# Patient Record
Sex: MALE | Race: WHITE | NOT HISPANIC OR LATINO | URBAN - METROPOLITAN AREA
[De-identification: names, ages, dates, MRNs, and addresses within clinical notes are randomized per-mention and may not be internally consistent; named-entity substitution may affect disease eponyms.]

---

## 2022-10-05 RX ORDER — ARIPIPRAZOLE 15 MG/1
400 TABLET ORAL
Qty: 0 | Refills: 0 | DISCHARGE
Start: 2022-10-05

## 2022-10-09 ENCOUNTER — OUTPATIENT (OUTPATIENT)
Dept: OUTPATIENT SERVICES | Facility: HOSPITAL | Age: 47
LOS: 1 days | End: 2022-10-09

## 2022-10-09 DIAGNOSIS — Z20.828 CONTACT WITH AND (SUSPECTED) EXPOSURE TO OTHER VIRAL COMMUNICABLE DISEASES: ICD-10-CM

## 2022-10-09 DIAGNOSIS — F20.9 SCHIZOPHRENIA, UNSPECIFIED: ICD-10-CM

## 2022-10-11 ENCOUNTER — INPATIENT (INPATIENT)
Facility: HOSPITAL | Age: 47
LOS: 14 days | Discharge: HOME | DRG: 163 | End: 2022-10-26
Attending: THORACIC SURGERY (CARDIOTHORACIC VASCULAR SURGERY) | Admitting: HOSPITALIST
Payer: MEDICARE

## 2022-10-11 VITALS
HEIGHT: 72 IN | WEIGHT: 195.11 LBS | DIASTOLIC BLOOD PRESSURE: 74 MMHG | TEMPERATURE: 100 F | OXYGEN SATURATION: 94 % | HEART RATE: 122 BPM | SYSTOLIC BLOOD PRESSURE: 114 MMHG | RESPIRATION RATE: 18 BRPM

## 2022-10-11 LAB
ALBUMIN SERPL ELPH-MCNC: 3.5 G/DL — SIGNIFICANT CHANGE UP (ref 3.3–5.2)
ALP SERPL-CCNC: 121 U/L — HIGH (ref 40–120)
ALT FLD-CCNC: 11 U/L — SIGNIFICANT CHANGE UP
ANION GAP SERPL CALC-SCNC: 13 MMOL/L — SIGNIFICANT CHANGE UP (ref 5–17)
AST SERPL-CCNC: 12 U/L — SIGNIFICANT CHANGE UP
BASE EXCESS BLDV CALC-SCNC: -0.4 MMOL/L — SIGNIFICANT CHANGE UP (ref -2–3)
BASOPHILS # BLD AUTO: 0.06 K/UL — SIGNIFICANT CHANGE UP (ref 0–0.2)
BASOPHILS NFR BLD AUTO: 0.3 % — SIGNIFICANT CHANGE UP (ref 0–2)
BILIRUB SERPL-MCNC: 0.3 MG/DL — LOW (ref 0.4–2)
BUN SERPL-MCNC: 10.5 MG/DL — SIGNIFICANT CHANGE UP (ref 8–20)
CA-I SERPL-SCNC: 1.15 MMOL/L — SIGNIFICANT CHANGE UP (ref 1.15–1.33)
CALCIUM SERPL-MCNC: 9.4 MG/DL — SIGNIFICANT CHANGE UP (ref 8.4–10.5)
CHLORIDE BLDV-SCNC: 99 MMOL/L — SIGNIFICANT CHANGE UP (ref 98–107)
CHLORIDE SERPL-SCNC: 96 MMOL/L — LOW (ref 98–107)
CO2 SERPL-SCNC: 22 MMOL/L — SIGNIFICANT CHANGE UP (ref 22–29)
CREAT SERPL-MCNC: 0.93 MG/DL — SIGNIFICANT CHANGE UP (ref 0.5–1.3)
EGFR: 103 ML/MIN/1.73M2 — SIGNIFICANT CHANGE UP
EOSINOPHIL # BLD AUTO: 0 K/UL — SIGNIFICANT CHANGE UP (ref 0–0.5)
EOSINOPHIL NFR BLD AUTO: 0 % — SIGNIFICANT CHANGE UP (ref 0–6)
GAS PNL BLDV: 129 MMOL/L — LOW (ref 136–145)
GAS PNL BLDV: SIGNIFICANT CHANGE UP
GAS PNL BLDV: SIGNIFICANT CHANGE UP
GLUCOSE BLDV-MCNC: 152 MG/DL — HIGH (ref 70–99)
GLUCOSE SERPL-MCNC: 140 MG/DL — HIGH (ref 70–99)
HCO3 BLDV-SCNC: 24 MMOL/L — SIGNIFICANT CHANGE UP (ref 22–29)
HCT VFR BLD CALC: 44.3 % — SIGNIFICANT CHANGE UP (ref 39–50)
HCT VFR BLDA CALC: 47 % — SIGNIFICANT CHANGE UP
HGB BLD CALC-MCNC: 15.6 G/DL — SIGNIFICANT CHANGE UP (ref 12.6–17.4)
HGB BLD-MCNC: 14.9 G/DL — SIGNIFICANT CHANGE UP (ref 13–17)
IMM GRANULOCYTES NFR BLD AUTO: 0.6 % — SIGNIFICANT CHANGE UP (ref 0–0.9)
LACTATE BLDV-MCNC: 2.1 MMOL/L — HIGH (ref 0.5–2)
LYMPHOCYTES # BLD AUTO: 1.05 K/UL — SIGNIFICANT CHANGE UP (ref 1–3.3)
LYMPHOCYTES # BLD AUTO: 4.8 % — LOW (ref 13–44)
MCHC RBC-ENTMCNC: 30.1 PG — SIGNIFICANT CHANGE UP (ref 27–34)
MCHC RBC-ENTMCNC: 33.6 GM/DL — SIGNIFICANT CHANGE UP (ref 32–36)
MCV RBC AUTO: 89.5 FL — SIGNIFICANT CHANGE UP (ref 80–100)
MONOCYTES # BLD AUTO: 0.96 K/UL — HIGH (ref 0–0.9)
MONOCYTES NFR BLD AUTO: 4.4 % — SIGNIFICANT CHANGE UP (ref 2–14)
NEUTROPHILS # BLD AUTO: 19.75 K/UL — HIGH (ref 1.8–7.4)
NEUTROPHILS NFR BLD AUTO: 89.9 % — HIGH (ref 43–77)
PCO2 BLDV: 35 MMHG — LOW (ref 42–55)
PH BLDV: 7.44 — HIGH (ref 7.32–7.43)
PLATELET # BLD AUTO: 603 K/UL — HIGH (ref 150–400)
PO2 BLDV: 81 MMHG — HIGH (ref 25–45)
POTASSIUM BLDV-SCNC: 4.3 MMOL/L — SIGNIFICANT CHANGE UP (ref 3.5–5.1)
POTASSIUM SERPL-MCNC: 4.4 MMOL/L — SIGNIFICANT CHANGE UP (ref 3.5–5.3)
POTASSIUM SERPL-SCNC: 4.4 MMOL/L — SIGNIFICANT CHANGE UP (ref 3.5–5.3)
PROT SERPL-MCNC: 7.1 G/DL — SIGNIFICANT CHANGE UP (ref 6.6–8.7)
RBC # BLD: 4.95 M/UL — SIGNIFICANT CHANGE UP (ref 4.2–5.8)
RBC # FLD: 13.2 % — SIGNIFICANT CHANGE UP (ref 10.3–14.5)
SAO2 % BLDV: 97.6 % — SIGNIFICANT CHANGE UP
SODIUM SERPL-SCNC: 131 MMOL/L — LOW (ref 135–145)
TROPONIN T SERPL-MCNC: <0.01 NG/ML — SIGNIFICANT CHANGE UP (ref 0–0.06)
WBC # BLD: 21.96 K/UL — HIGH (ref 3.8–10.5)
WBC # FLD AUTO: 21.96 K/UL — HIGH (ref 3.8–10.5)

## 2022-10-11 PROCEDURE — 71045 X-RAY EXAM CHEST 1 VIEW: CPT | Mod: 26

## 2022-10-11 PROCEDURE — 99285 EMERGENCY DEPT VISIT HI MDM: CPT

## 2022-10-11 RX ORDER — CEFTRIAXONE 500 MG/1
1000 INJECTION, POWDER, FOR SOLUTION INTRAMUSCULAR; INTRAVENOUS ONCE
Refills: 0 | Status: COMPLETED | OUTPATIENT
Start: 2022-10-11 | End: 2022-10-11

## 2022-10-11 RX ORDER — CEFTRIAXONE 500 MG/1
1000 INJECTION, POWDER, FOR SOLUTION INTRAMUSCULAR; INTRAVENOUS ONCE
Refills: 0 | Status: DISCONTINUED | OUTPATIENT
Start: 2022-10-11 | End: 2022-10-11

## 2022-10-11 RX ORDER — SODIUM CHLORIDE 9 MG/ML
2700 INJECTION, SOLUTION INTRAVENOUS ONCE
Refills: 0 | Status: COMPLETED | OUTPATIENT
Start: 2022-10-11 | End: 2022-10-11

## 2022-10-11 RX ORDER — ACETAMINOPHEN 500 MG
975 TABLET ORAL ONCE
Refills: 0 | Status: COMPLETED | OUTPATIENT
Start: 2022-10-11 | End: 2022-10-11

## 2022-10-11 RX ADMIN — Medication 975 MILLIGRAM(S): at 22:58

## 2022-10-11 RX ADMIN — Medication 975 MILLIGRAM(S): at 21:58

## 2022-10-11 RX ADMIN — SODIUM CHLORIDE 2700 MILLILITER(S): 9 INJECTION, SOLUTION INTRAVENOUS at 21:59

## 2022-10-11 RX ADMIN — CEFTRIAXONE 1000 MILLIGRAM(S): 500 INJECTION, POWDER, FOR SOLUTION INTRAMUSCULAR; INTRAVENOUS at 21:59

## 2022-10-11 NOTE — ED PROVIDER NOTE - CLINICAL SUMMARY MEDICAL DECISION MAKING FREE TEXT BOX
Pleuritic chest pain, fever, leukocytosis, no hypoxia, has large loculated pleural effusion on CT. Empiric abx. CT surgery consulted for intervention. Medical admission for IV abx.

## 2022-10-11 NOTE — ED ADULT TRIAGE NOTE - CHIEF COMPLAINT QUOTE
Pt A&Ox4 states "I haven't been feeling well for past few days." BIBA c/o left sided pain. Patient has been feeling unwell for a few days.

## 2022-10-11 NOTE — ED PROVIDER NOTE - OBJECTIVE STATEMENT
47yom inpatient [facility] p/w left sided chest/abdominal pain. Reports intermittent pain on his left side for the last 4 days as well as a dry cough. Currently denies any pain. Denies any fever or chills but per [facility] pt was tachycardic.

## 2022-10-12 ENCOUNTER — RESULT REVIEW (OUTPATIENT)
Age: 47
End: 2022-10-12

## 2022-10-12 DIAGNOSIS — J90 PLEURAL EFFUSION, NOT ELSEWHERE CLASSIFIED: ICD-10-CM

## 2022-10-12 DIAGNOSIS — Z90.89 ACQUIRED ABSENCE OF OTHER ORGANS: Chronic | ICD-10-CM

## 2022-10-12 DIAGNOSIS — F25.1 SCHIZOAFFECTIVE DISORDER, DEPRESSIVE TYPE: ICD-10-CM

## 2022-10-12 LAB
ALBUMIN SERPL ELPH-MCNC: 2.7 G/DL — LOW (ref 3.3–5.2)
ALP SERPL-CCNC: 97 U/L — SIGNIFICANT CHANGE UP (ref 40–120)
ALT FLD-CCNC: 9 U/L — SIGNIFICANT CHANGE UP
ANION GAP SERPL CALC-SCNC: 8 MMOL/L — SIGNIFICANT CHANGE UP (ref 5–17)
APPEARANCE UR: CLEAR — SIGNIFICANT CHANGE UP
APTT BLD: 33 SEC — SIGNIFICANT CHANGE UP (ref 27.5–35.5)
AST SERPL-CCNC: 8 U/L — SIGNIFICANT CHANGE UP
B PERT IGG+IGM PNL SER: ABNORMAL
BACTERIA # UR AUTO: ABNORMAL
BASE EXCESS BLDV CALC-SCNC: 1.6 MMOL/L — SIGNIFICANT CHANGE UP (ref -2–3)
BILIRUB SERPL-MCNC: 0.6 MG/DL — SIGNIFICANT CHANGE UP (ref 0.4–2)
BILIRUB UR-MCNC: NEGATIVE — SIGNIFICANT CHANGE UP
BUN SERPL-MCNC: 8.4 MG/DL — SIGNIFICANT CHANGE UP (ref 8–20)
CA-I SERPL-SCNC: 1.13 MMOL/L — LOW (ref 1.15–1.33)
CALCIUM SERPL-MCNC: 9 MG/DL — SIGNIFICANT CHANGE UP (ref 8.4–10.5)
CHLORIDE BLDV-SCNC: 100 MMOL/L — SIGNIFICANT CHANGE UP (ref 98–107)
CHLORIDE SERPL-SCNC: 98 MMOL/L — SIGNIFICANT CHANGE UP (ref 98–107)
CO2 SERPL-SCNC: 23 MMOL/L — SIGNIFICANT CHANGE UP (ref 22–29)
COLOR FLD: ABNORMAL
COLOR SPEC: YELLOW — SIGNIFICANT CHANGE UP
COMMENT - URINE: SIGNIFICANT CHANGE UP
CREAT SERPL-MCNC: 0.64 MG/DL — SIGNIFICANT CHANGE UP (ref 0.5–1.3)
DIFF PNL FLD: NEGATIVE — SIGNIFICANT CHANGE UP
EGFR: 119 ML/MIN/1.73M2 — SIGNIFICANT CHANGE UP
EOSINOPHIL # FLD: 1 % — SIGNIFICANT CHANGE UP
EPI CELLS # UR: NEGATIVE — SIGNIFICANT CHANGE UP
FLUID INTAKE SUBSTANCE CLASS: SIGNIFICANT CHANGE UP
GAS PNL BLDV: 129 MMOL/L — LOW (ref 136–145)
GAS PNL BLDV: SIGNIFICANT CHANGE UP
GAS PNL BLDV: SIGNIFICANT CHANGE UP
GLUCOSE BLDV-MCNC: 130 MG/DL — HIGH (ref 70–99)
GLUCOSE SERPL-MCNC: 145 MG/DL — HIGH (ref 70–99)
GLUCOSE UR QL: NEGATIVE MG/DL — SIGNIFICANT CHANGE UP
GRAM STN FLD: SIGNIFICANT CHANGE UP
HCO3 BLDV-SCNC: 26 MMOL/L — SIGNIFICANT CHANGE UP (ref 22–29)
HCT VFR BLD CALC: 41.4 % — SIGNIFICANT CHANGE UP (ref 39–50)
HCT VFR BLDA CALC: 43 % — SIGNIFICANT CHANGE UP
HGB BLD CALC-MCNC: 14.2 G/DL — SIGNIFICANT CHANGE UP (ref 12.6–17.4)
HGB BLD-MCNC: 13.8 G/DL — SIGNIFICANT CHANGE UP (ref 13–17)
HYALINE CASTS # UR AUTO: ABNORMAL /LPF
INR BLD: 1.8 RATIO — HIGH (ref 0.88–1.16)
KETONES UR-MCNC: ABNORMAL
LACTATE BLDV-MCNC: 1.3 MMOL/L — SIGNIFICANT CHANGE UP (ref 0.5–2)
LEUKOCYTE ESTERASE UR-ACNC: ABNORMAL
LYMPHOCYTES # FLD: 1 % — SIGNIFICANT CHANGE UP
MCHC RBC-ENTMCNC: 30.1 PG — SIGNIFICANT CHANGE UP (ref 27–34)
MCHC RBC-ENTMCNC: 33.3 GM/DL — SIGNIFICANT CHANGE UP (ref 32–36)
MCV RBC AUTO: 90.4 FL — SIGNIFICANT CHANGE UP (ref 80–100)
MONOS+MACROS # FLD: 4 % — SIGNIFICANT CHANGE UP
NEUTROPHILS-BODY FLUID: 94 % — SIGNIFICANT CHANGE UP
NITRITE UR-MCNC: NEGATIVE — SIGNIFICANT CHANGE UP
PCO2 BLDV: 39 MMHG — LOW (ref 42–55)
PH BLDV: 7.43 — SIGNIFICANT CHANGE UP (ref 7.32–7.43)
PH FLD: 7.5 — SIGNIFICANT CHANGE UP
PH UR: 6 — SIGNIFICANT CHANGE UP (ref 5–8)
PLATELET # BLD AUTO: 509 K/UL — HIGH (ref 150–400)
PO2 BLDV: 141 MMHG — HIGH (ref 25–45)
POTASSIUM BLDV-SCNC: 4.5 MMOL/L — SIGNIFICANT CHANGE UP (ref 3.5–5.1)
POTASSIUM SERPL-MCNC: 4.2 MMOL/L — SIGNIFICANT CHANGE UP (ref 3.5–5.3)
POTASSIUM SERPL-SCNC: 4.2 MMOL/L — SIGNIFICANT CHANGE UP (ref 3.5–5.3)
PROT SERPL-MCNC: 6.4 G/DL — LOW (ref 6.6–8.7)
PROT UR-MCNC: 15
PROTHROM AB SERPL-ACNC: 21 SEC — HIGH (ref 10.5–13.4)
RAPID RVP RESULT: SIGNIFICANT CHANGE UP
RBC # BLD: 4.58 M/UL — SIGNIFICANT CHANGE UP (ref 4.2–5.8)
RBC # FLD: 13.2 % — SIGNIFICANT CHANGE UP (ref 10.3–14.5)
RBC CASTS # UR COMP ASSIST: ABNORMAL /HPF (ref 0–4)
RCV VOL RI: <3000 /UL — HIGH (ref 0–0)
SAO2 % BLDV: 99.1 % — SIGNIFICANT CHANGE UP
SARS-COV-2 RNA SPEC QL NAA+PROBE: SIGNIFICANT CHANGE UP
SODIUM SERPL-SCNC: 129 MMOL/L — LOW (ref 135–145)
SP GR SPEC: 1.02 — SIGNIFICANT CHANGE UP (ref 1.01–1.02)
SPECIMEN SOURCE FLD: SIGNIFICANT CHANGE UP
SPECIMEN SOURCE FLD: SIGNIFICANT CHANGE UP
SPECIMEN SOURCE: SIGNIFICANT CHANGE UP
TOTAL NUCLEATED CELL COUNT, BODY FLUID: SIGNIFICANT CHANGE UP /UL
TUBE TYPE: SIGNIFICANT CHANGE UP
UROBILINOGEN FLD QL: NEGATIVE MG/DL — SIGNIFICANT CHANGE UP
WBC # BLD: 32.26 K/UL — HIGH (ref 3.8–10.5)
WBC # FLD AUTO: 32.26 K/UL — HIGH (ref 3.8–10.5)
WBC UR QL: SIGNIFICANT CHANGE UP /HPF (ref 0–5)

## 2022-10-12 PROCEDURE — 71045 X-RAY EXAM CHEST 1 VIEW: CPT | Mod: 26,77

## 2022-10-12 PROCEDURE — 88305 TISSUE EXAM BY PATHOLOGIST: CPT | Mod: 26

## 2022-10-12 PROCEDURE — 71045 X-RAY EXAM CHEST 1 VIEW: CPT | Mod: 26

## 2022-10-12 PROCEDURE — 99222 1ST HOSP IP/OBS MODERATE 55: CPT | Mod: 25

## 2022-10-12 PROCEDURE — 93010 ELECTROCARDIOGRAM REPORT: CPT

## 2022-10-12 PROCEDURE — 32551 INSERTION OF CHEST TUBE: CPT

## 2022-10-12 PROCEDURE — 99223 1ST HOSP IP/OBS HIGH 75: CPT

## 2022-10-12 PROCEDURE — 71250 CT THORAX DX C-: CPT | Mod: 26,MA

## 2022-10-12 PROCEDURE — 88112 CYTOPATH CELL ENHANCE TECH: CPT | Mod: 26

## 2022-10-12 RX ORDER — NICOTINE POLACRILEX 2 MG
1 GUM BUCCAL
Qty: 0 | Refills: 0 | DISCHARGE

## 2022-10-12 RX ORDER — DOCUSATE SODIUM 100 MG
1 CAPSULE ORAL
Qty: 0 | Refills: 0 | DISCHARGE

## 2022-10-12 RX ORDER — CLONAZEPAM 1 MG
1 TABLET ORAL
Qty: 0 | Refills: 0 | DISCHARGE

## 2022-10-12 RX ORDER — SENNA PLUS 8.6 MG/1
2 TABLET ORAL
Qty: 0 | Refills: 0 | DISCHARGE

## 2022-10-12 RX ORDER — CLONAZEPAM 1 MG
1 TABLET ORAL THREE TIMES A DAY
Refills: 0 | Status: DISCONTINUED | OUTPATIENT
Start: 2022-10-12 | End: 2022-10-19

## 2022-10-12 RX ORDER — PIPERACILLIN AND TAZOBACTAM 4; .5 G/20ML; G/20ML
3.38 INJECTION, POWDER, LYOPHILIZED, FOR SOLUTION INTRAVENOUS ONCE
Refills: 0 | Status: COMPLETED | OUTPATIENT
Start: 2022-10-12 | End: 2022-10-12

## 2022-10-12 RX ORDER — ONDANSETRON 8 MG/1
4 TABLET, FILM COATED ORAL EVERY 8 HOURS
Refills: 0 | Status: DISCONTINUED | OUTPATIENT
Start: 2022-10-12 | End: 2022-10-21

## 2022-10-12 RX ORDER — LANOLIN ALCOHOL/MO/W.PET/CERES
3 CREAM (GRAM) TOPICAL AT BEDTIME
Refills: 0 | Status: DISCONTINUED | OUTPATIENT
Start: 2022-10-12 | End: 2022-10-21

## 2022-10-12 RX ORDER — LITHIUM CARBONATE 300 MG/1
450 TABLET, EXTENDED RELEASE ORAL
Refills: 0 | Status: DISCONTINUED | OUTPATIENT
Start: 2022-10-12 | End: 2022-10-21

## 2022-10-12 RX ORDER — ACETAMINOPHEN 500 MG
650 TABLET ORAL EVERY 6 HOURS
Refills: 0 | Status: DISCONTINUED | OUTPATIENT
Start: 2022-10-12 | End: 2022-10-21

## 2022-10-12 RX ORDER — ATROPINE SULFATE 1 %
1 DROPS OPHTHALMIC (EYE)
Refills: 0 | Status: DISCONTINUED | OUTPATIENT
Start: 2022-10-12 | End: 2022-10-21

## 2022-10-12 RX ORDER — SENNA PLUS 8.6 MG/1
2 TABLET ORAL AT BEDTIME
Refills: 0 | Status: DISCONTINUED | OUTPATIENT
Start: 2022-10-12 | End: 2022-10-21

## 2022-10-12 RX ORDER — AZITHROMYCIN 500 MG/1
500 TABLET, FILM COATED ORAL ONCE
Refills: 0 | Status: COMPLETED | OUTPATIENT
Start: 2022-10-12 | End: 2022-10-12

## 2022-10-12 RX ORDER — CLOZAPINE 150 MG/1
250 TABLET, ORALLY DISINTEGRATING ORAL
Qty: 0 | Refills: 0 | DISCHARGE

## 2022-10-12 RX ORDER — ATROPINE SULFATE 1 %
2 DROPS OPHTHALMIC (EYE)
Qty: 0 | Refills: 0 | DISCHARGE

## 2022-10-12 RX ORDER — CLOZAPINE 150 MG/1
250 TABLET, ORALLY DISINTEGRATING ORAL AT BEDTIME
Refills: 0 | Status: DISCONTINUED | OUTPATIENT
Start: 2022-10-12 | End: 2022-10-21

## 2022-10-12 RX ORDER — NICOTINE POLACRILEX 2 MG
1 GUM BUCCAL DAILY
Refills: 0 | Status: DISCONTINUED | OUTPATIENT
Start: 2022-10-12 | End: 2022-10-21

## 2022-10-12 RX ORDER — LITHIUM CARBONATE 300 MG/1
3 TABLET, EXTENDED RELEASE ORAL
Qty: 0 | Refills: 0 | DISCHARGE

## 2022-10-12 RX ORDER — PIPERACILLIN AND TAZOBACTAM 4; .5 G/20ML; G/20ML
3.38 INJECTION, POWDER, LYOPHILIZED, FOR SOLUTION INTRAVENOUS EVERY 8 HOURS
Refills: 0 | Status: DISCONTINUED | OUTPATIENT
Start: 2022-10-12 | End: 2022-10-21

## 2022-10-12 RX ADMIN — PIPERACILLIN AND TAZOBACTAM 25 GRAM(S): 4; .5 INJECTION, POWDER, LYOPHILIZED, FOR SOLUTION INTRAVENOUS at 15:21

## 2022-10-12 RX ADMIN — LITHIUM CARBONATE 450 MILLIGRAM(S): 300 TABLET, EXTENDED RELEASE ORAL at 22:45

## 2022-10-12 RX ADMIN — AZITHROMYCIN 255 MILLIGRAM(S): 500 TABLET, FILM COATED ORAL at 07:06

## 2022-10-12 RX ADMIN — PIPERACILLIN AND TAZOBACTAM 200 GRAM(S): 4; .5 INJECTION, POWDER, LYOPHILIZED, FOR SOLUTION INTRAVENOUS at 09:09

## 2022-10-12 RX ADMIN — Medication 1 PATCH: at 09:10

## 2022-10-12 RX ADMIN — PIPERACILLIN AND TAZOBACTAM 25 GRAM(S): 4; .5 INJECTION, POWDER, LYOPHILIZED, FOR SOLUTION INTRAVENOUS at 22:45

## 2022-10-12 RX ADMIN — Medication 1 DROP(S): at 18:20

## 2022-10-12 NOTE — H&P ADULT - ASSESSMENT
46 yo male with hx of Schizoaffective disorder, bipolar and tobacco abuse presents with complaints of left sided pain and shortness of breath. Patient reports 4 day  history of body aches with poor appetite. He has only been drinking liquids at home. Patient was found to be tachycardiac up to 140s at [facility] and was therefore sent in for an evaluation. While in the ED, cxr and CT chest was obtained which showed a large loculated left pleural effusion. CT surgery was consulted and admission to medicine was requested.       #Large loculated pleural effusion  CXR Reviewed  CT chest noted     CT surgery to perform possible chest tube  Send fluid analysis   Was given ceftriaxone/azithromycin in ED - Will place on zosyn for now  De-escalate ABX as appropriate    #Schizoaffective disorder/Bipolar  C/w Clonazaepam 1mg tid  C/w lithium 450mg BID  Aripiprazole 400mg IM every 28 days - Last dose was 10/5/2022  Clozapine 250mg daily  Behavioral health to evaluate  [facility] 1:1 at bedside    #Tobacco abuse  Nicotine patch  Counseling    SCDs

## 2022-10-12 NOTE — H&P ADULT - NSICDXPASTMEDICALHX_GEN_ALL_CORE_FT
PAST MEDICAL HISTORY:  Bipolar disorder     History of tobacco abuse     Schizoaffective disorder

## 2022-10-12 NOTE — H&P ADULT - NSHPSOCIALHISTORY_GEN_ALL_CORE
Lives at [facility] for the past five months  Prior smoker of 2ppd but has not been smoking since at [facility]  Does not drink alcohol

## 2022-10-12 NOTE — CONSULT NOTE ADULT - ASSESSMENT
ASSESSMENT:   47 year old male patient, current [place] resident, admitted to medicine with c/o left sided abdominal pain/chest discomfort X 4 days. +Dry cough. Patient found with Leukocytosis (WBC >20), low grade fever, and CT chest showing Large Left Loculated Pleural Effusion. Patient in no acute respiratory distress, with SpO2 stable on room air.     PLAN:  Admitted to Medicine.  Continuous SpO2 monitoring.  Patient currently stable with SpO2 >92% on room air while sitting/talking.  Maintain SpO2 >92%. Supplement with O2 therapy PRN.   POCUS to be performed at bedside later today to further evaluate the Left sided pleural effusion / evaluation for a chest tube.   Will consider placing a chest tube sooner if patient experiences increased work of breathing, SpO2 starts to trend down, O2 requirements start to trend up.   Further plan as per primary team.   Will continue to follow along.

## 2022-10-12 NOTE — ED BEHAVIORAL HEALTH ASSESSMENT NOTE - HPI (INCLUDE ILLNESS QUALITY, SEVERITY, DURATION, TIMING, CONTEXT, MODIFYING FACTORS, ASSOCIATED SIGNS AND SYMPTOMS)
[facility] patients wife called as they have concerns about Lipitor and would like to discuss. She can be reached at [phone] (h).  
Call to pt and spouse informed of MD instructions. Pt has 90 day supply of atorvastatin. No further questions at this time.  
Call to pt's spouse Bita and pt. Pt has concerns that medication is causing insomnia. Pt wants to try atorvastatin again and take it in the morning time. Pt had read an article that that statin is ok to take in AM. Informed pt and spouse will review with MD and call back. No further questions.  
Left a message for patient to call back.    
Patient returned your call, please call them back.  
Per verbal order by meli sEpana to change to atorvastatin and take in AM.    
50 y/o male with PPH of schizoaffective disorder and Tobacco use disorder presents to the ED from [facility] complaining of Left sided chest pain. States that the pain is sharp.     Patient is lying in bed, stable on room air. States that he had past thoughts of suicidality, but never went through with it, due to being scared. States that he used to smoke every day, but stopped once admitted to [place]. States that he also feels depressed. Denies huy, alcohol dependence, changes in appetite, hallucinations, delusions.

## 2022-10-12 NOTE — H&P ADULT - NSHPPHYSICALEXAM_GEN_ALL_CORE
PHYSICAL EXAM:  Vital Signs Last 24 Hrs  T(F): 98.8 (12 Oct 2022 08:07), Max: 100 (11 Oct 2022 20:23)  HR: 110 (12 Oct 2022 08:07) (89 - 122)  BP: 109/68 (12 Oct 2022 08:07) (109/68 - 121/72)  RR: 19 (12 Oct 2022 08:07) (18 - 25)  SpO2: 91% (12 Oct 2022 08:07) (91% - 96%)  [place] 1:1 at bedside  GENERAL: NAD, Resting in bed  Eyes: EOMI, PERRLA  ENMT: Conjunctiva and sclera clear; supple neck, No JVD  Cardiovascular: S1,S2, RRR, No murmur  Respiratory: Decreased breath sounds on right side and clear on left  GI: Bowel sounds present; Soft, Nontender, Nondistended. No hepatomegaly  Genitourinary: Deferred  Skin:  no breakdowns, ulcers or discharge, No rashes or lesions  Neurology: Alert & Oriented, non-focal and spontaneous movements of all extremities, CN 2 to 12 grossly intact   Psych: Appropriate mood and affect, calm, pleasant

## 2022-10-12 NOTE — H&P ADULT - HISTORY OF PRESENT ILLNESS
46 yo male with hx of Schizoaffective disorder, bipolar and tobacco abuse presents with complaints of left sided pain and shortness of breath. Patient reports 4 day  history of body aches with poor appetite. He has only been drinking liquids at home. Patient was found to be tachycardiac up to 140s at [facility] and was therefore sent in for an evaluation. Patient endorses subjective fevers associated with a dry cough while at [facility]. While in the ED, cxr and CT chest was obtained which showed a large loculated left pleural effusion. CT surgery was consulted and admission to medicine was requested.

## 2022-10-12 NOTE — ED BEHAVIORAL HEALTH ASSESSMENT NOTE - DESCRIPTION
Unknown Patient is lying in bed.     Medications  Azithromycin 500mg IV infusion over 60 min 10/12/22 6:47, Tylenol 975 mg PO once 10/11/22 20:50, Ceftriaxone 1000mg IV push 10/11/22 20:52, Clonazepam 1mg Oral TID 10/12/22 8:42, Lithium 450 mg PO BID 1012/22 8:42, Pipercillin/Tazobactam  3.375 g IV q 8 hrs 10/12/22 8:47  Vital Signs Last 24 Hrs  T(C): 37.1 (12 Oct 2022 08:07), Max: 37.8 (11 Oct 2022 20:23)  T(F): 98.8 (12 Oct 2022 08:07), Max: 100 (11 Oct 2022 20:23)  HR: 110 (12 Oct 2022 08:07) (89 - 122)  BP: 109/68 (12 Oct 2022 08:07) (109/68 - 121/72)  BP(mean): --  RR: 19 (12 Oct 2022 08:07) (18 - 25)  SpO2: 91% (12 Oct 2022 08:07) (91% - 96%)    Parameters below as of 12 Oct 2022 08:07  Patient On (Oxygen Delivery Method): room air HTN dyslipidemia obesity

## 2022-10-12 NOTE — ED BEHAVIORAL HEALTH ASSESSMENT NOTE - SUMMARY
Patient w/ left sided Pleural effusion, and depressive symptoms. Patient is awaiting CT surgery for consult on removal of the pleural effusion

## 2022-10-12 NOTE — ED BEHAVIORAL HEALTH ASSESSMENT NOTE - RISK ASSESSMENT
Risk factors: Suicidality, current and past hospitalization    Protective: currently taking medication Low Acute Suicide Risk

## 2022-10-12 NOTE — ED BEHAVIORAL HEALTH ASSESSMENT NOTE - NSBHATTESTCOMMENTATTENDFT_PSY_A_CORE
cooperative seems to comprehend medical conditions and need for possible aspiration  I believe he has capacity to give consent

## 2022-10-12 NOTE — H&P ADULT - NSHPREVIEWOFSYSTEMS_GEN_ALL_CORE
Review of Systems:  CONSTITUTIONAL: +Weakness  EYES/ENT: No visual changes;  No vertigo or throat pain   NECK: No pain or stiffness  RESPIRATORY: No  wheezing, hemoptysis; +SOB AND COUGH  CARDIOVASCULAR: No chest pain or palpitations  GASTROINTESTINAL: No abdominal or epigastric pain. No nausea, vomiting, or hematemesis; No diarrhea or constipation.   GENITOURINARY: No dysuria, frequency or hematuria  NEUROLOGICAL: No numbness or weakness  SKIN: No itching, burning, rashes, or lesions   All other review of systems is negative unless indicated above

## 2022-10-12 NOTE — ED ADULT NURSE REASSESSMENT NOTE - NS ED NURSE REASSESS COMMENT FT1
assumed care of pt, pt resting comfortably in stretcher, chest tune remains in place draining to gravity, no complaints offered at this time, home aid at bedside, will ctm.

## 2022-10-12 NOTE — ED BEHAVIORAL HEALTH ASSESSMENT NOTE - CURRENT MEDICATION
Aripiprazole 400mg IM q1 month  Atropine Sulfate  1% opthalmic solution BID  Benzocaine 6/Menthol 10mg PO QID  Clonazepam 1mg PO QD  Clozapine 200mg PO QD  Clozapine 50mg PO QD  Docusate NA 100mg PO TID  Guaifenesin 100mg/5ml PO TID  Ltihium carbonate 150mg PO BID  Lithium Carbonate 300mg PO BID  Magnesium hydroxide 2400mg/30ml PO q.3.d  Nicotine 14mg/24hr patch QD  Sennosides 8.6mg PO BID Aripiprazole 400mg IM q1 month  Atropine Sulfate  1% opthalmic solution BID  Benzocaine 6/Menthol 10mg PO QID  Clonazepam 1mg PO QD  Clozapine 200mg PO QD  Clozapine 50mg PO QD  Docusate NA 100mg PO TID  Guaifenesin 100mg/5ml PO TID  Lithium carbonate 150mg PO BID  Lithium Carbonate 300mg PO BID  Magnesium hydroxide 2400mg/30ml PO q.3.d  Nicotine 14mg/24hr patch QD  Sennosides 8.6mg PO BID

## 2022-10-13 LAB
A1C WITH ESTIMATED AVERAGE GLUCOSE RESULT: 5.4 % — SIGNIFICANT CHANGE UP (ref 4–5.6)
ACANTHOCYTES BLD QL SMEAR: SLIGHT — SIGNIFICANT CHANGE UP
ALBUMIN FLD-MCNC: 2.7 G/DL — SIGNIFICANT CHANGE UP
ALBUMIN SERPL ELPH-MCNC: 2.6 G/DL — LOW (ref 3.3–5.2)
ALP SERPL-CCNC: 96 U/L — SIGNIFICANT CHANGE UP (ref 40–120)
ALT FLD-CCNC: 9 U/L — SIGNIFICANT CHANGE UP
ANION GAP SERPL CALC-SCNC: 11 MMOL/L — SIGNIFICANT CHANGE UP (ref 5–17)
AST SERPL-CCNC: 9 U/L — SIGNIFICANT CHANGE UP
BASOPHILS # BLD AUTO: 0 K/UL — SIGNIFICANT CHANGE UP (ref 0–0.2)
BASOPHILS NFR BLD AUTO: 0 % — SIGNIFICANT CHANGE UP (ref 0–2)
BILIRUB SERPL-MCNC: 0.7 MG/DL — SIGNIFICANT CHANGE UP (ref 0.4–2)
BUN SERPL-MCNC: 9.2 MG/DL — SIGNIFICANT CHANGE UP (ref 8–20)
CALCIUM SERPL-MCNC: 8.9 MG/DL — SIGNIFICANT CHANGE UP (ref 8.4–10.5)
CHLORIDE SERPL-SCNC: 97 MMOL/L — LOW (ref 98–107)
CHOLEST SERPL-MCNC: 74 MG/DL — SIGNIFICANT CHANGE UP
CO2 SERPL-SCNC: 24 MMOL/L — SIGNIFICANT CHANGE UP (ref 22–29)
CREAT SERPL-MCNC: 0.77 MG/DL — SIGNIFICANT CHANGE UP (ref 0.5–1.3)
CULTURE RESULTS: SIGNIFICANT CHANGE UP
EGFR: 113 ML/MIN/1.73M2 — SIGNIFICANT CHANGE UP
EOSINOPHIL # BLD AUTO: 0 K/UL — SIGNIFICANT CHANGE UP (ref 0–0.5)
EOSINOPHIL NFR BLD AUTO: 0 % — SIGNIFICANT CHANGE UP (ref 0–6)
ESTIMATED AVERAGE GLUCOSE: 108 MG/DL — SIGNIFICANT CHANGE UP (ref 68–114)
GLUCOSE FLD-MCNC: <2 MG/DL — SIGNIFICANT CHANGE UP
GLUCOSE SERPL-MCNC: 114 MG/DL — HIGH (ref 70–99)
HCT VFR BLD CALC: 42.2 % — SIGNIFICANT CHANGE UP (ref 39–50)
HDLC SERPL-MCNC: 28 MG/DL — LOW
HGB BLD-MCNC: 14.1 G/DL — SIGNIFICANT CHANGE UP (ref 13–17)
LDH SERPL L TO P-CCNC: 529 U/L — SIGNIFICANT CHANGE UP
LIPID PNL WITH DIRECT LDL SERPL: 22 MG/DL — SIGNIFICANT CHANGE UP
LYMPHOCYTES # BLD AUTO: 0.81 K/UL — LOW (ref 1–3.3)
LYMPHOCYTES # BLD AUTO: 2.6 % — LOW (ref 13–44)
MANUAL SMEAR VERIFICATION: SIGNIFICANT CHANGE UP
MCHC RBC-ENTMCNC: 30.2 PG — SIGNIFICANT CHANGE UP (ref 27–34)
MCHC RBC-ENTMCNC: 33.4 GM/DL — SIGNIFICANT CHANGE UP (ref 32–36)
MCV RBC AUTO: 90.4 FL — SIGNIFICANT CHANGE UP (ref 80–100)
MONOCYTES # BLD AUTO: 0.53 K/UL — SIGNIFICANT CHANGE UP (ref 0–0.9)
MONOCYTES NFR BLD AUTO: 1.7 % — LOW (ref 2–14)
NEUTROPHILS # BLD AUTO: 29.41 K/UL — HIGH (ref 1.8–7.4)
NEUTROPHILS NFR BLD AUTO: 93.9 % — HIGH (ref 43–77)
NEUTS BAND # BLD: 0.9 % — SIGNIFICANT CHANGE UP (ref 0–8)
NON HDL CHOLESTEROL: 46 MG/DL — SIGNIFICANT CHANGE UP
PLAT MORPH BLD: NORMAL — SIGNIFICANT CHANGE UP
PLATELET # BLD AUTO: 531 K/UL — HIGH (ref 150–400)
POIKILOCYTOSIS BLD QL AUTO: SLIGHT — SIGNIFICANT CHANGE UP
POLYCHROMASIA BLD QL SMEAR: SIGNIFICANT CHANGE UP
POTASSIUM SERPL-MCNC: 4 MMOL/L — SIGNIFICANT CHANGE UP (ref 3.5–5.3)
POTASSIUM SERPL-SCNC: 4 MMOL/L — SIGNIFICANT CHANGE UP (ref 3.5–5.3)
PROT FLD-MCNC: 5 G/DL — SIGNIFICANT CHANGE UP
PROT SERPL-MCNC: 6.3 G/DL — LOW (ref 6.6–8.7)
RBC # BLD: 4.67 M/UL — SIGNIFICANT CHANGE UP (ref 4.2–5.8)
RBC # FLD: 13.2 % — SIGNIFICANT CHANGE UP (ref 10.3–14.5)
RBC BLD AUTO: ABNORMAL
SODIUM SERPL-SCNC: 132 MMOL/L — LOW (ref 135–145)
SPECIMEN SOURCE: SIGNIFICANT CHANGE UP
TRIGL SERPL-MCNC: 122 MG/DL — SIGNIFICANT CHANGE UP
VARIANT LYMPHS # BLD: 0.9 % — SIGNIFICANT CHANGE UP (ref 0–6)
WBC # BLD: 31.02 K/UL — HIGH (ref 3.8–10.5)
WBC # FLD AUTO: 31.02 K/UL — HIGH (ref 3.8–10.5)

## 2022-10-13 PROCEDURE — 99406 BEHAV CHNG SMOKING 3-10 MIN: CPT

## 2022-10-13 PROCEDURE — 99233 SBSQ HOSP IP/OBS HIGH 50: CPT

## 2022-10-13 PROCEDURE — 99231 SBSQ HOSP IP/OBS SF/LOW 25: CPT | Mod: FS

## 2022-10-13 RX ADMIN — Medication 1 DROP(S): at 06:31

## 2022-10-13 RX ADMIN — LITHIUM CARBONATE 450 MILLIGRAM(S): 300 TABLET, EXTENDED RELEASE ORAL at 06:31

## 2022-10-13 RX ADMIN — Medication 1 MILLIGRAM(S): at 14:14

## 2022-10-13 RX ADMIN — Medication 1 MILLIGRAM(S): at 06:04

## 2022-10-13 RX ADMIN — LITHIUM CARBONATE 450 MILLIGRAM(S): 300 TABLET, EXTENDED RELEASE ORAL at 19:05

## 2022-10-13 RX ADMIN — SENNA PLUS 2 TABLET(S): 8.6 TABLET ORAL at 06:05

## 2022-10-13 RX ADMIN — PIPERACILLIN AND TAZOBACTAM 25 GRAM(S): 4; .5 INJECTION, POWDER, LYOPHILIZED, FOR SOLUTION INTRAVENOUS at 23:30

## 2022-10-13 RX ADMIN — PIPERACILLIN AND TAZOBACTAM 25 GRAM(S): 4; .5 INJECTION, POWDER, LYOPHILIZED, FOR SOLUTION INTRAVENOUS at 14:14

## 2022-10-13 RX ADMIN — Medication 1 DROP(S): at 19:06

## 2022-10-13 RX ADMIN — PIPERACILLIN AND TAZOBACTAM 25 GRAM(S): 4; .5 INJECTION, POWDER, LYOPHILIZED, FOR SOLUTION INTRAVENOUS at 06:32

## 2022-10-13 NOTE — PATIENT PROFILE ADULT - FALL HARM RISK - HARM RISK INTERVENTIONS

## 2022-10-13 NOTE — ED ADULT NURSE REASSESSMENT NOTE - NS ED NURSE REASSESS COMMENT FT1
Report received from offgoing RN, charting as noted. Patient is resting comfortably in stretcher, [facility] aide at bedside, cardiovascular at bedside. patients pleuravac with 1900 total output. Pleuravac changed to new collection chamber.

## 2022-10-13 NOTE — PROGRESS NOTE ADULT - ASSESSMENT
ASSESSMENT:   47 year old male patient, current [place] resident, admitted to medicine with c/o left sided abdominal pain/chest discomfort X 4 days. +Dry cough. Patient found with Leukocytosis (WBC >20), low grade fever, and CT chest showing Large Left Loculated Pleural Effusion. Patient in no acute respiratory distress, with SpO2 stable on room air. 10/12 left percutaneous chest tube placement

## 2022-10-13 NOTE — PROGRESS NOTE ADULT - SUBJECTIVE AND OBJECTIVE BOX
Patient is a 47y old  Male who presents with a chief complaint of Large Left loculated pleural effusion (12 Oct 2022 08:47)    Patient seen and examined at bedside     ALLERGIES:  No Known Allergies    MEDICATIONS  (STANDING):  atropine 1% Solution 1 Drop(s) Both EYES two times a day  clonazePAM  Tablet 1 milliGRAM(s) Oral three times a day  cloZAPine 250 milliGRAM(s) Oral at bedtime  lithium 450 milliGRAM(s) Oral two times a day  nicotine -  14 mG/24Hr(s) Patch 1 Patch Transdermal daily  piperacillin/tazobactam IVPB.. 3.375 Gram(s) IV Intermittent every 8 hours  senna 2 Tablet(s) Oral at bedtime    MEDICATIONS  (PRN):  acetaminophen     Tablet .. 650 milliGRAM(s) Oral every 6 hours PRN Temp greater or equal to 38C (100.4F), Mild Pain (1 - 3)  aluminum hydroxide/magnesium hydroxide/simethicone Suspension 30 milliLiter(s) Oral every 4 hours PRN Dyspepsia  melatonin 3 milliGRAM(s) Oral at bedtime PRN Insomnia  ondansetron Injectable 4 milliGRAM(s) IV Push every 8 hours PRN Nausea and/or Vomiting    Vital Signs Last 24 Hrs  T(F): 100.3 (13 Oct 2022 06:02), Max: 100.3 (13 Oct 2022 06:02)  HR: 93 (13 Oct 2022 11:22) (93 - 112)  BP: 116/67 (13 Oct 2022 11:22) (112/60 - 147/87)  RR: 22 (13 Oct 2022 11:22) (18 - 22)  SpO2: 96% (13 Oct 2022 11:22) (93% - 96%)  I&O's Summary    12 Oct 2022 07:  -  13 Oct 2022 07:00  --------------------------------------------------------  IN: 0 mL / OUT: 1700 mL / NET: -1700 mL    13 Oct 2022 07:01  -  13 Oct 2022 11:48  --------------------------------------------------------  IN: 0 mL / OUT: 200 mL / NET: -200 mL    PHYSICAL EXAM:  General: NAD, laying in bed   ENT: MMM, no thrush  Neck: Supple, No JVD  Lungs: decreased breath sounds b/l +chest tube   Cardio: +s1/s2  Abdomen: Soft, Nontender, Nondistended; Bowel sounds present  Extremities: No calf tenderness, No pitting edema    LABS:                        14.1   31.02 )-----------( 531      ( 13 Oct 2022 06:13 )             42.2     10-13    132  |  97  |  9.2  ----------------------------<  114  4.0   |  24.0  |  0.77    Ca    8.9      13 Oct 2022 06:13    TPro  6.3  /  Alb  2.6  /  TBili  0.7  /  DBili  x   /  AST  9   /  ALT  9   /  AlkPhos  96  10-13          PT/INR - ( 12 Oct 2022 12:11 )   PT: 21.0 sec;   INR: 1.80 ratio         PTT - ( 12 Oct 2022 12:11 )  PTT:33.0 sec          10-13 Chol 74 mg/dL LDL -- HDL 28 mg/dL Trig 122 mg/dL    00:07 - VBG - pH: 7.430 | pCO2: 39    | pO2: 141   | Lactate: 1.30   21:50 - VBG - pH: 7.440 | pCO2: 35    | pO2: 81    | Lactate: 2.10                 Urinalysis Basic - ( 12 Oct 2022 01:20 )    Color: Yellow / Appearance: Clear / S.020 / pH: x  Gluc: x / Ketone: Trace  / Bili: Negative / Urobili: Negative mg/dL   Blood: x / Protein: 15 / Nitrite: Negative   Leuk Esterase: Trace / RBC: 3-5 /HPF / WBC 3-5 /HPF   Sq Epi: x / Non Sq Epi: Negative / Bacteria: Few    Cultures  Culture - Fungal, Body Fluid (collected 12 Oct 2022 12:02)  Source: Pleural Fl Pleural Fluid  Preliminary Report (13 Oct 2022 10:55):    Testing in progress    Culture - Body Fluid with Gram Stain (collected 12 Oct 2022 12:02)  Source: Pleural Fl Pleural Fluid  Gram Stain (12 Oct 2022 22:32):    polymorphonuclear leukocytes seen    No organisms seen    by cytocentrifuge    Culture - Urine (collected 12 Oct 2022 01:20)  Source: Clean Catch Clean Catch (Midstream)  Final Report (13 Oct 2022 11:19):    <10,000 CFU/mL Normal Urogenital [place]    Culture - Blood (collected 11 Oct 2022 21:50)  Source: .Blood Blood-Peripheral  Preliminary Report (13 Oct 2022 04:02):    No growth to date.    Culture - Blood (collected 11 Oct 2022 21:50)  Source: .Blood Blood-Peripheral  Preliminary Report (13 Oct 2022 04:02):    No growth to date.    RADIOLOGY & ADDITIONAL TESTS:  - no new tests    Care Discussed with Consultants/Other Providers:   Psychiatry

## 2022-10-13 NOTE — PROGRESS NOTE ADULT - PROBLEM SELECTOR PLAN 1
Continuous SpO2 monitoring.  Patient currently stable with SpO2 >92% on room air while sitting/talking  Maintain SpO2 >92%. Supplement with O2 therapy PRN.   Daily CXR while tube is in place  Maintain chest tube to water seal  Exudative effusion per light's criteria   f/u pleural fluid cultures/cytology    Plan of care discussed with Dr. Abreu during AM rounds

## 2022-10-13 NOTE — PATIENT PROFILE ADULT - ARRIVAL FROM
[Procedure: _________] : a [unfilled] procedure visit [FreeTextEntry1] : here for [facility]

## 2022-10-13 NOTE — PROGRESS NOTE ADULT - ASSESSMENT
46 yo male with hx of Schizoaffective disorder, bipolar and tobacco abuse presents with complaints of left sided pain and shortness of breath.     #Large loculated pleural effusion  - s/p chest tube via ct surgery - mgmt per ct surgery   - pathology pending   - zosyn    #Schizoaffective disorder/Bipolar  - clonazepam, lithium, aripiprazole (IM once monthly 10/5/2022), clozapine  - 1:1 via [facility]  - psych consult appreciated    #Tobacco abuse  - Nicotine patch    #DVT Prophylaxis  - venodynes

## 2022-10-13 NOTE — ED BEHAVIORAL HEALTH NOTE - BEHAVIORAL HEALTH NOTE
PROGRESS NOTE: 10-13-22 @ 08:23  	  • Reason for Ongoing Consultation: Schizoaffective disorder	    ID: [id number] Male with HEALTH ISSUES - PROBLEM Dx:  Pleural effusion, left    Schizoaffective disorder, depressive type            INTERVAL DATA:   • Interval Chief Complaint:   • Interval History:     REVIEW OF SYSTEMS:   • Constitutional Symptoms	No complaints  • Eyes	No complaints  • Ears / Nose / Throat / Mouth	No complaints  • Cardiovascular	No complaints  • Respiratory	No complaints  • Gastrointestinal	No complaints  • Genitourinary	No complaints  • Musculoskeletal	No complaints  • Skin	No complaints  • Neurological	No complaints  • Psychiatric (see HPI)	See HPI  • Endocrine	No complaints  • Hematologic / Lymphatic	No complaints  • Allergic / Immunologic	No complaints    REVIEW OF VITALS/LABS/IMAGING/INVESTIGATIONS:   • Vital signs reviewed: Yes  • Vital Signs:	    T(C): 37.9 (10-13-22 @ 06:02), Max: 37.9 (10-13-22 @ 06:02)  HR: 104 (10-13-22 @ 06:02) (104 - 112)  BP: 128/76 (10-13-22 @ 06:02) (111/66 - 147/87)  RR: 18 (10-13-22 @ 06:02) (18 - 21)  SpO2: 93% (10-13-22 @ 06:02) (93% - 96%)    • Available labs reviewed: Yes  • Available Lab Results:                           14.1   31.02 )-----------( 531      ( 13 Oct 2022 06:13 )             42.2     10-13    132<L>  |  97<L>  |  9.2  ----------------------------<  114<H>  4.0   |  24.0  |  0.77    Ca    8.9      13 Oct 2022 06:13    TPro  6.3<L>  /  Alb  2.6<L>  /  TBili  0.7  /  DBili  x   /  AST  9   /  ALT  9   /  AlkPhos  96  10-13    LIVER FUNCTIONS - ( 13 Oct 2022 06:13 )  Alb: 2.6 g/dL / Pro: 6.3 g/dL / ALK PHOS: 96 U/L / ALT: 9 U/L / AST: 9 U/L / GGT: x           PT/INR - ( 12 Oct 2022 12:11 )   PT: 21.0 sec;   INR: 1.80 ratio         PTT - ( 12 Oct 2022 12:11 )  PTT:33.0 sec  Urinalysis Basic - ( 12 Oct 2022 01:20 )    Color: Yellow / Appearance: Clear / S.020 / pH: x  Gluc: x / Ketone: Trace  / Bili: Negative / Urobili: Negative mg/dL   Blood: x / Protein: 15 / Nitrite: Negative   Leuk Esterase: Trace / RBC: 3-5 /HPF / WBC 3-5 /HPF   Sq Epi: x / Non Sq Epi: Negative / Bacteria: Few          MEDICATIONS:      PRN Medications:  • PRN Medications since last evaluation	  • PRN Details	    Current Medications:   acetaminophen     Tablet .. 650 milliGRAM(s) Oral every 6 hours PRN  aluminum hydroxide/magnesium hydroxide/simethicone Suspension 30 milliLiter(s) Oral every 4 hours PRN  atropine 1% Solution 1 Drop(s) Both EYES two times a day  clonazePAM  Tablet 1 milliGRAM(s) Oral three times a day  cloZAPine 250 milliGRAM(s) Oral at bedtime  lithium 450 milliGRAM(s) Oral two times a day  melatonin 3 milliGRAM(s) Oral at bedtime PRN  nicotine -  14 mG/24Hr(s) Patch 1 Patch Transdermal daily  ondansetron Injectable 4 milliGRAM(s) IV Push every 8 hours PRN  piperacillin/tazobactam IVPB.. 3.375 Gram(s) IV Intermittent every 8 hours  senna 2 Tablet(s) Oral at bedtime     Medication Side Effects:  • Medication Side Effects or Adverse Reactions (new or ongoing)	None known    MENTAL STATUS EXAM:   • Level of Consciousness	Alert  • General Appearance	Well developed  • Body Habitus	Well nourished  • Hygiene	Good  • Grooming	Good  • Behavior	Cooperative  • Eye Contact	Good  • Relatedness	Good  • Impulse Control	Normal  • Muscle Tone / Strength	Normal muscle tone/strength  • Abnormal Movements	No abnormal movements  • Gait / Station	Normal gait / station  • Speech Volume	Normal  • Speech Rate	Normal  • Speech Spontaneity	Normal  • Speech Articulation	Normal  • Mood	Normal  • Affect Quality	Euthymic  • Affect Range	Full  • Affect Congruence	Congruent  • Thought Process	Linear  • Thought Associations	Normal  • Thought Content	Unremarkable  • Perceptions	No abnormalities  • Oriented to Time	Yes  • Oriented to Place	Yes  • Oriented to Situation	Yes  • Oriented to Person	Yes  • Attention / Concentration	Normal  • Estimated Intelligence	Average  • Recent Memory	Normal  • Remote Memory	Normal  • Fund of Knowledge	Normal  • Language	No abnormalities noted  • Judgment (regarding everyday events)	Fair  • Insight (regarding psychiatric illness)	Fair    SUICIDALITY:   • Suicidality (Interval)	none known    HOMICIDALITY/AGGRESSION:   • Homicidality/Aggression	none known    DIAGNOSIS DSM-V:    Psychiatric Diagnosis (Corresponds to DSM-IV Axis I, II):   HEALTH ISSUES - PROBLEM Dx:  Pleural effusion, left    Schizoaffective disorder, depressive type             Medical Diagnosis (Corresponds to DSM-IV Axis III):  • Axis III	      ASSESSMENT OF CURRENT CONDITION:   Summary (include case differential, formulation and patient response to therapy):   Patient is presently asleep with chest tube in. Aid states that patient had a good night with no disturbances. Patient is compliant with plan of care.       PLAN  Continue current treatment plan PROGRESS NOTE: 10-13-22 @ 08:29  	  • Reason for Ongoing Consultation: 	    ID: [id number] Male with HEALTH ISSUES - PROBLEM Dx:  Pleural effusion, left    Schizoaffective disorder, depressive type            INTERVAL DATA:   • Interval Chief Complaint:   • Interval History:     REVIEW OF SYSTEMS:   • Constitutional Symptoms	No complaints  • Eyes	No complaints  • Ears / Nose / Throat / Mouth	No complaints  • Cardiovascular	No complaints  • Respiratory	No complaints  • Gastrointestinal	No complaints  • Genitourinary	No complaints  • Musculoskeletal	No complaints  • Skin	No complaints  • Neurological	No complaints  • Psychiatric (see HPI)	See HPI  • Endocrine	No complaints  • Hematologic / Lymphatic	No complaints  • Allergic / Immunologic	No complaints    REVIEW OF VITALS/LABS/IMAGING/INVESTIGATIONS:   • Vital signs reviewed: Yes  • Vital Signs:	    T(C): 37.9 (10-13-22 @ 06:02), Max: 37.9 (10-13-22 @ 06:02)  HR: 104 (10-13-22 @ 06:02) (104 - 112)  BP: 128/76 (10-13-22 @ 06:02) (111/66 - 147/87)  RR: 18 (10-13-22 @ 06:02) (18 - 21)  SpO2: 93% (10-13-22 @ 06:02) (93% - 96%)    • Available labs reviewed: Yes  • Available Lab Results:                           14.1   31.02 )-----------( 531      ( 13 Oct 2022 06:13 )             42.2     10-13    132<L>  |  97<L>  |  9.2  ----------------------------<  114<H>  4.0   |  24.0  |  0.77    Ca    8.9      13 Oct 2022 06:13    TPro  6.3<L>  /  Alb  2.6<L>  /  TBili  0.7  /  DBili  x   /  AST  9   /  ALT  9   /  AlkPhos  96  10-13    LIVER FUNCTIONS - ( 13 Oct 2022 06:13 )  Alb: 2.6 g/dL / Pro: 6.3 g/dL / ALK PHOS: 96 U/L / ALT: 9 U/L / AST: 9 U/L / GGT: x           PT/INR - ( 12 Oct 2022 12:11 )   PT: 21.0 sec;   INR: 1.80 ratio         PTT - ( 12 Oct 2022 12:11 )  PTT:33.0 sec  Urinalysis Basic - ( 12 Oct 2022 01:20 )    Color: Yellow / Appearance: Clear / S.020 / pH: x  Gluc: x / Ketone: Trace  / Bili: Negative / Urobili: Negative mg/dL   Blood: x / Protein: 15 / Nitrite: Negative   Leuk Esterase: Trace / RBC: 3-5 /HPF / WBC 3-5 /HPF   Sq Epi: x / Non Sq Epi: Negative / Bacteria: Few          MEDICATIONS:      PRN Medications:  • PRN Medications since last evaluation	  • PRN Details	    Current Medications:   acetaminophen     Tablet .. 650 milliGRAM(s) Oral every 6 hours PRN  aluminum hydroxide/magnesium hydroxide/simethicone Suspension 30 milliLiter(s) Oral every 4 hours PRN  atropine 1% Solution 1 Drop(s) Both EYES two times a day  clonazePAM  Tablet 1 milliGRAM(s) Oral three times a day  cloZAPine 250 milliGRAM(s) Oral at bedtime  lithium 450 milliGRAM(s) Oral two times a day  melatonin 3 milliGRAM(s) Oral at bedtime PRN  nicotine -  14 mG/24Hr(s) Patch 1 Patch Transdermal daily  ondansetron Injectable 4 milliGRAM(s) IV Push every 8 hours PRN  piperacillin/tazobactam IVPB.. 3.375 Gram(s) IV Intermittent every 8 hours  senna 2 Tablet(s) Oral at bedtime     Medication Side Effects:  • Medication Side Effects or Adverse Reactions (new or ongoing)	None known    MENTAL STATUS EXAM:   • Level of Consciousness	Alert  • General Appearance	Well developed  • Body Habitus	Well nourished  • Hygiene	Good  • Grooming	Good  • Behavior	Cooperative  • Eye Contact	Good  • Relatedness	Good  • Impulse Control	Normal  • Muscle Tone / Strength	Normal muscle tone/strength  • Abnormal Movements	No abnormal movements  • Gait / Station	Normal gait / station  • Speech Volume	Normal  • Speech Rate	Normal  • Speech Spontaneity	Normal  • Speech Articulation	Normal  • Mood	Normal  • Affect Quality	Euthymic  • Affect Range	Full  • Affect Congruence	Congruent  • Thought Process	Linear  • Thought Associations	Normal  • Thought Content	Unremarkable  • Perceptions	No abnormalities  • Oriented to Time	Yes  • Oriented to Place	Yes  • Oriented to Situation	Yes  • Oriented to Person	Yes  • Attention / Concentration	Normal  • Estimated Intelligence	Average  • Recent Memory	Normal  • Remote Memory	Normal  • Fund of Knowledge	Normal  • Language	No abnormalities noted  • Judgment (regarding everyday events)	Fair  • Insight (regarding psychiatric illness)	Fair    SUICIDALITY:   • Suicidality (Interval)	none known    HOMICIDALITY/AGGRESSION:   • Homicidality/Aggression	none known    DIAGNOSIS DSM-V:    Psychiatric Diagnosis (Corresponds to DSM-IV Axis I, II):   HEALTH ISSUES - PROBLEM Dx:  Pleural effusion, left    Schizoaffective disorder, depressive type             Medical Diagnosis (Corresponds to DSM-IV Axis III):  • Axis III	      ASSESSMENT OF CURRENT CONDITION:   Summary (include case differential, formulation and patient response to therapy):   Patient is currently sleeping in bed. Aid states patient had an uneventful night. Patient currently has chest tube draining. Compliant with plan of care        PLAN  Continue draining chest tube and refer back to [place] once treated PROGRESS NOTE: 10-13-22 @ 08:29  	  • Reason for Ongoing Consultation: Depressive symptoms with past suicidal ideation.     ID: [id number] Male with HEALTH ISSUES - PROBLEM Dx:  Pleural effusion, left    Schizoaffective disorder, depressive type            INTERVAL DATA:   • Interval Chief Complaint:   • Interval History:     REVIEW OF SYSTEMS:   • Constitutional Symptoms	No complaints  • Eyes	No complaints  • Ears / Nose / Throat / Mouth	No complaints  • Cardiovascular	No complaints  • Respiratory	No complaints  • Gastrointestinal	No complaints  • Genitourinary	No complaints  • Musculoskeletal	No complaints  • Skin	No complaints  • Neurological	No complaints  • Psychiatric (see HPI)	See HPI  • Endocrine	No complaints  • Hematologic / Lymphatic	No complaints  • Allergic / Immunologic	No complaints    REVIEW OF VITALS/LABS/IMAGING/INVESTIGATIONS:   • Vital signs reviewed: Yes  • Vital Signs:	    T(C): 37.9 (10-13-22 @ 06:02), Max: 37.9 (10-13-22 @ 06:02)  HR: 104 (10-13-22 @ 06:02) (104 - 112)  BP: 128/76 (10-13-22 @ 06:02) (111/66 - 147/87)  RR: 18 (10-13-22 @ 06:02) (18 - 21)  SpO2: 93% (10-13-22 @ 06:02) (93% - 96%)    • Available labs reviewed: Yes  • Available Lab Results:                           14.1   31.02 )-----------( 531      ( 13 Oct 2022 06:13 )             42.2     10-13    132<L>  |  97<L>  |  9.2  ----------------------------<  114<H>  4.0   |  24.0  |  0.77    Ca    8.9      13 Oct 2022 06:13    TPro  6.3<L>  /  Alb  2.6<L>  /  TBili  0.7  /  DBili  x   /  AST  9   /  ALT  9   /  AlkPhos  96  10-13    LIVER FUNCTIONS - ( 13 Oct 2022 06:13 )  Alb: 2.6 g/dL / Pro: 6.3 g/dL / ALK PHOS: 96 U/L / ALT: 9 U/L / AST: 9 U/L / GGT: x           PT/INR - ( 12 Oct 2022 12:11 )   PT: 21.0 sec;   INR: 1.80 ratio         PTT - ( 12 Oct 2022 12:11 )  PTT:33.0 sec  Urinalysis Basic - ( 12 Oct 2022 01:20 )    Color: Yellow / Appearance: Clear / S.020 / pH: x  Gluc: x / Ketone: Trace  / Bili: Negative / Urobili: Negative mg/dL   Blood: x / Protein: 15 / Nitrite: Negative   Leuk Esterase: Trace / RBC: 3-5 /HPF / WBC 3-5 /HPF   Sq Epi: x / Non Sq Epi: Negative / Bacteria: Few          MEDICATIONS:      PRN Medications:  • PRN Medications since last evaluation	  • PRN Details	    Current Medications:   acetaminophen     Tablet .. 650 milliGRAM(s) Oral every 6 hours PRN  aluminum hydroxide/magnesium hydroxide/simethicone Suspension 30 milliLiter(s) Oral every 4 hours PRN  atropine 1% Solution 1 Drop(s) Both EYES two times a day  clonazePAM  Tablet 1 milliGRAM(s) Oral three times a day  cloZAPine 250 milliGRAM(s) Oral at bedtime  lithium 450 milliGRAM(s) Oral two times a day  melatonin 3 milliGRAM(s) Oral at bedtime PRN  nicotine -  14 mG/24Hr(s) Patch 1 Patch Transdermal daily  ondansetron Injectable 4 milliGRAM(s) IV Push every 8 hours PRN  piperacillin/tazobactam IVPB.. 3.375 Gram(s) IV Intermittent every 8 hours  senna 2 Tablet(s) Oral at bedtime     Medication Side Effects:  • Medication Side Effects or Adverse Reactions (new or ongoing)	None known    MENTAL STATUS EXAM:   • Level of Consciousness	Alert  • General Appearance	Well developed  • Body Habitus	Well nourished  • Hygiene	Good  • Grooming	Good  • Behavior	Cooperative  • Eye Contact	Good  • Relatedness	Good  • Impulse Control	Normal  • Muscle Tone / Strength	Normal muscle tone/strength  • Abnormal Movements	No abnormal movements  • Gait / Station	Normal gait / station  • Speech Volume	Normal  • Speech Rate	Normal  • Speech Spontaneity	Normal  • Speech Articulation	Normal  • Mood	Normal  • Affect Quality	Euthymic  • Affect Range	Full  • Affect Congruence	Congruent  • Thought Process	Linear  • Thought Associations	Normal  • Thought Content	Unremarkable  • Perceptions	No abnormalities  • Oriented to Time	Yes  • Oriented to Place	Yes  • Oriented to Situation	Yes  • Oriented to Person	Yes  • Attention / Concentration	Normal  • Estimated Intelligence	Average  • Recent Memory	Normal  • Remote Memory	Normal  • Fund of Knowledge	Normal  • Language	No abnormalities noted  • Judgment (regarding everyday events)	Fair  • Insight (regarding psychiatric illness)	Fair    SUICIDALITY:   • Suicidality (Interval)	none known    HOMICIDALITY/AGGRESSION:   • Homicidality/Aggression	none known    DIAGNOSIS DSM-V:    Psychiatric Diagnosis (Corresponds to DSM-IV Axis I, II):   HEALTH ISSUES - PROBLEM Dx:  Pleural effusion, left    Schizoaffective disorder, depressive type             Medical Diagnosis (Corresponds to DSM-IV Axis III):  • Axis III	      ASSESSMENT OF CURRENT CONDITION:   Summary (include case differential, formulation and patient response to therapy):   Patient is currently sleeping in bed. Aid states patient had an uneventful night. Patient currently has chest tube draining. Compliant with plan of care        PLAN  Continue draining chest tube and refer back to [place] once treated PROGRESS NOTE: 10-13-22 @ 08:29  	  • Reason for Ongoing Consultation: Depressive symptoms with past suicidal ideation.     ID: [id number] Male with HEALTH ISSUES - PROBLEM Dx:  Pleural effusion, left    Schizoaffective disorder, depressive type            INTERVAL DATA:   • Interval Chief Complaint:   • Interval History:     REVIEW OF SYSTEMS:   not assessed    REVIEW OF VITALS/LABS/IMAGING/INVESTIGATIONS:   • Vital signs reviewed: Yes  • Vital Signs:	    T(C): 37.9 (10-13-22 @ 06:02), Max: 37.9 (10-13-22 @ 06:02)  HR: 104 (10-13-22 @ 06:02) (104 - 112)  BP: 128/76 (10-13-22 @ 06:02) (111/66 - 147/87)  RR: 18 (10-13-22 @ 06:02) (18 - 21)  SpO2: 93% (10-13-22 @ 06:02) (93% - 96%)    • Available labs reviewed: Yes  • Available Lab Results:                           14.1   31.02 )-----------( 531      ( 13 Oct 2022 06:13 )             42.2     10-13    132<L>  |  97<L>  |  9.2  ----------------------------<  114<H>  4.0   |  24.0  |  0.77    Ca    8.9      13 Oct 2022 06:13    TPro  6.3<L>  /  Alb  2.6<L>  /  TBili  0.7  /  DBili  x   /  AST  9   /  ALT  9   /  AlkPhos  96  10-13    LIVER FUNCTIONS - ( 13 Oct 2022 06:13 )  Alb: 2.6 g/dL / Pro: 6.3 g/dL / ALK PHOS: 96 U/L / ALT: 9 U/L / AST: 9 U/L / GGT: x           PT/INR - ( 12 Oct 2022 12:11 )   PT: 21.0 sec;   INR: 1.80 ratio         PTT - ( 12 Oct 2022 12:11 )  PTT:33.0 sec  Urinalysis Basic - ( 12 Oct 2022 01:20 )    Color: Yellow / Appearance: Clear / S.020 / pH: x  Gluc: x / Ketone: Trace  / Bili: Negative / Urobili: Negative mg/dL   Blood: x / Protein: 15 / Nitrite: Negative   Leuk Esterase: Trace / RBC: 3-5 /HPF / WBC 3-5 /HPF   Sq Epi: x / Non Sq Epi: Negative / Bacteria: Few          MEDICATIONS:      PRN Medications:  • PRN Medications since last evaluation	  • PRN Details	    Current Medications:   acetaminophen     Tablet .. 650 milliGRAM(s) Oral every 6 hours PRN  aluminum hydroxide/magnesium hydroxide/simethicone Suspension 30 milliLiter(s) Oral every 4 hours PRN  atropine 1% Solution 1 Drop(s) Both EYES two times a day  clonazepam  Tablet 1 milliGRAM(s) Oral three times a day  clozapine 250 milliGRAM(s) Oral at bedtime  lithium 450 milliGRAM(s) Oral two times a day  melatonin 3 milliGRAM(s) Oral at bedtime PRN  nicotine -  14 mG/24Hr(s) Patch 1 Patch Transdermal daily  ondansetron Injectable 4 milliGRAM(s) IV Push every 8 hours PRN  piperacillin/tazobactam IVPB.. 3.375 Gram(s) IV Intermittent every 8 hours  senna 2 Tablet(s) Oral at bedtime     Medication Side Effects:  • Medication Side Effects or Adverse Reactions (new or ongoing)	None known    MENTAL STATUS EXAM:   • Level of Consciousness	Alert  • General Appearance	Well developed  • Body Habitus	Well nourished  • Hygiene	Good  • Grooming	Good  • Behavior	Cooperative  • Eye Contact	Good  • Relatedness	Good  • Impulse Control	Normal  • Muscle Tone / Strength	Normal muscle tone/strength  • Abnormal Movements	No abnormal movements  • Gait / Station	Normal gait / station  • Speech Volume	Normal  • Speech Rate	Normal  • Speech Spontaneity	Normal  • Speech Articulation	Normal  • Mood	Normal  • Affect Quality	Euthymic  • Affect Range	Full  • Affect Congruence	Congruent  • Thought Process	Linear  • Thought Associations	Normal  • Thought Content	Unremarkable  • Perceptions	No abnormalities  • Oriented to Time	Yes  • Oriented to Place	Yes  • Oriented to Situation	Yes  • Oriented to Person	Yes  • Attention / Concentration	Normal  • Estimated Intelligence	Average  • Recent Memory	Normal  • Remote Memory	Normal  • Fund of Knowledge	Normal  • Language	No abnormalities noted  • Judgment (regarding everyday events)	Fair  • Insight (regarding psychiatric illness)	Fair    SUICIDALITY:   • Suicidality (Interval)	none known    HOMICIDALITY/AGGRESSION:   • Homicidality/Aggression	none known    DIAGNOSIS DSM-V:    Psychiatric Diagnosis (Corresponds to DSM-IV Axis I, II):   HEALTH ISSUES - PROBLEM Dx:  Pleural effusion, left    Schizoaffective disorder, depressive type             Medical Diagnosis (Corresponds to DSM-IV Axis III):  • Axis III	      ASSESSMENT OF CURRENT CONDITION:   Summary (include case differential, formulation and patient response to therapy):   Patient is currently sleeping in bed. Aid states patient had an uneventful night. Patient currently has chest tube draining. Compliant with plan of care        PLAN  Continue draining chest tube and refer back to [place] once treated PROGRESS NOTE: 10-13-22 @ 08:29  	  • Reason for Ongoing Consultation: Depressive symptoms with past suicidal ideation.     ID: [id number] Male with HEALTH ISSUES - PROBLEM Dx:  Pleural effusion, left    Schizoaffective disorder, depressive type            INTERVAL DATA:   • Interval Chief Complaint: Left sided chest pain   • Interval History: 46 y/o male with PPH of schizoaffective disorder, depressive type presents with left sided pleural effusion. Patient is currently asleep, with chest tube draining pleural fluid. Aid states patient was stable over night, and fully complaint with treatment.     REVIEW OF SYSTEMS:   not assessed    REVIEW OF VITALS/LABS/IMAGING/INVESTIGATIONS:   • Vital signs reviewed: Yes  • Vital Signs:	    T(C): 37.9 (10-13-22 @ 06:02), Max: 37.9 (10-13-22 @ 06:02)  HR: 104 (10-13-22 @ 06:02) (104 - 112)  BP: 128/76 (10-13-22 @ 06:02) (111/66 - 147/87)  RR: 18 (10-13-22 @ 06:02) (18 - 21)  SpO2: 93% (10-13-22 @ 06:02) (93% - 96%)    • Available labs reviewed: Yes  • Available Lab Results:                           14.1   31.02 )-----------( 531      ( 13 Oct 2022 06:13 )             42.2     10-13    132<L>  |  97<L>  |  9.2  ----------------------------<  114<H>  4.0   |  24.0  |  0.77    Ca    8.9      13 Oct 2022 06:13    TPro  6.3<L>  /  Alb  2.6<L>  /  TBili  0.7  /  DBili  x   /  AST  9   /  ALT  9   /  AlkPhos  96  10-13    LIVER FUNCTIONS - ( 13 Oct 2022 06:13 )  Alb: 2.6 g/dL / Pro: 6.3 g/dL / ALK PHOS: 96 U/L / ALT: 9 U/L / AST: 9 U/L / GGT: x           PT/INR - ( 12 Oct 2022 12:11 )   PT: 21.0 sec;   INR: 1.80 ratio         PTT - ( 12 Oct 2022 12:11 )  PTT:33.0 sec  Urinalysis Basic - ( 12 Oct 2022 01:20 )    Color: Yellow / Appearance: Clear / S.020 / pH: x  Gluc: x / Ketone: Trace  / Bili: Negative / Urobili: Negative mg/dL   Blood: x / Protein: 15 / Nitrite: Negative   Leuk Esterase: Trace / RBC: 3-5 /HPF / WBC 3-5 /HPF   Sq Epi: x / Non Sq Epi: Negative / Bacteria: Few          MEDICATIONS:      PRN Medications:  • PRN Medications since last evaluation	  • PRN Details	    Current Medications:   acetaminophen     Tablet .. 650 milliGRAM(s) Oral every 6 hours PRN  aluminum hydroxide/magnesium hydroxide/simethicone Suspension 30 milliLiter(s) Oral every 4 hours PRN  atropine 1% Solution 1 Drop(s) Both EYES two times a day  clonazepam  Tablet 1 milliGRAM(s) Oral three times a day  clozapine 250 milliGRAM(s) Oral at bedtime  lithium 450 milliGRAM(s) Oral two times a day  melatonin 3 milliGRAM(s) Oral at bedtime PRN  nicotine -  14 mG/24Hr(s) Patch 1 Patch Transdermal daily  ondansetron Injectable 4 milliGRAM(s) IV Push every 8 hours PRN  piperacillin/tazobactam IVPB.. 3.375 Gram(s) IV Intermittent every 8 hours  senna 2 Tablet(s) Oral at bedtime     Medication Side Effects:  • Medication Side Effects or Adverse Reactions (new or ongoing)	None known    MENTAL STATUS EXAM:   Not assessed due to patient being asleep     SUICIDALITY:   • Suicidality (Interval)	none known    HOMICIDALITY/AGGRESSION:   • Homicidality/Aggression	none known    DIAGNOSIS DSM-V:    Psychiatric Diagnosis (Corresponds to DSM-IV Axis I, II):   HEALTH ISSUES - PROBLEM Dx:  Pleural effusion, left    Schizoaffective disorder, depressive type             Medical Diagnosis (Corresponds to DSM-IV Axis III):  • Axis III	      ASSESSMENT OF CURRENT CONDITION:   Summary (include case differential, formulation and patient response to therapy):   Patient is currently sleeping in bed. Aid states patient had an uneventful night. Patient currently has chest tube draining. Compliant with plan of care        PLAN  Continue draining chest tube and refer back to [place] once treated

## 2022-10-13 NOTE — PATIENT PROFILE ADULT - HOME ACCESSIBILITY CONCERNS
Phone call to patient and appt scheduled for today    
I have personally performed a face to face diagnostic evaluation on this patient. I have reviewed the ACP note and agree with the history, exam and plan of care, except as noted.
none

## 2022-10-13 NOTE — PROGRESS NOTE ADULT - SUBJECTIVE AND OBJECTIVE BOX
Subjective:  Pt seen and examined at bedside, pt sleeping. Admits that he is diaphoretic, attributes it to room temperature. Offers no acute complaints at this time. Denies fever, chills, chest pain, palpitations, SOB, cough, abd pain, N/V/D, constipation.    Vital Signs:  Vital Signs Last 24 Hrs  T(C): 37.9 (10-13-22 @ 06:02), Max: 37.9 (10-13-22 @ 06:02)  T(F): 100.3 (10-13-22 @ 06:02), Max: 100.3 (10-13-22 @ 06:02)  HR: 93 (10-13-22 @ 11:22) (93 - 112)  BP: 116/67 (10-13-22 @ 11:22) (112/60 - 147/87)  RR: 22 (10-13-22 @ 11:22) (18 - 22)  SpO2: 96% (10-13-22 @ 11:22) (93% - 96%) on (O2)      Relevant labs, radiology and Medications reviewed    Pertinent Physical Exam  General: flat affect,  NAD  Neurology: AOx3, nonfocal, HUNTER x 4  Neck: Neck supple, trachea midline  Respiratory: no accessory muscle use, crackles at left mid/lower chest. No wheezing, rhonchi  CV: RRR, S1S2, no murmurs, rubs or gallops.   Abdominal: +BS. Soft, NT, ND   Extremities: no edema b/l lower extremities, + peripheral pulses  Tubes: left sided chest tube with dressing c/d/i, serous drainage, no air leak    10-12 @ 07:01  -  10-13 @ 07:00  --------------------------------------------------------  IN:  Total IN: 0 mL    OUT:    Chest Tube (mL): 1700 mL  Total OUT: 1700 mL    Total NET: -1700 mL      10-13 @ 07:01  -  10-13 @ 11:59  --------------------------------------------------------  IN:  Total IN: 0 mL    OUT:    Chest Tube (mL): 200 mL  Total OUT: 200 mL    Total NET: -200 mL

## 2022-10-14 LAB
ANION GAP SERPL CALC-SCNC: 13 MMOL/L — SIGNIFICANT CHANGE UP (ref 5–17)
BUN SERPL-MCNC: 11.1 MG/DL — SIGNIFICANT CHANGE UP (ref 8–20)
CALCIUM SERPL-MCNC: 9 MG/DL — SIGNIFICANT CHANGE UP (ref 8.4–10.5)
CHLORIDE SERPL-SCNC: 97 MMOL/L — LOW (ref 98–107)
CO2 SERPL-SCNC: 23 MMOL/L — SIGNIFICANT CHANGE UP (ref 22–29)
CREAT SERPL-MCNC: 0.85 MG/DL — SIGNIFICANT CHANGE UP (ref 0.5–1.3)
EGFR: 109 ML/MIN/1.73M2 — SIGNIFICANT CHANGE UP
GLUCOSE SERPL-MCNC: 131 MG/DL — HIGH (ref 70–99)
HCT VFR BLD CALC: 40.4 % — SIGNIFICANT CHANGE UP (ref 39–50)
HGB BLD-MCNC: 13 G/DL — SIGNIFICANT CHANGE UP (ref 13–17)
MCHC RBC-ENTMCNC: 29.5 PG — SIGNIFICANT CHANGE UP (ref 27–34)
MCHC RBC-ENTMCNC: 32.2 GM/DL — SIGNIFICANT CHANGE UP (ref 32–36)
MCV RBC AUTO: 91.8 FL — SIGNIFICANT CHANGE UP (ref 80–100)
NON-GYNECOLOGICAL CYTOLOGY STUDY: SIGNIFICANT CHANGE UP
PLATELET # BLD AUTO: 425 K/UL — HIGH (ref 150–400)
POTASSIUM SERPL-MCNC: 3.6 MMOL/L — SIGNIFICANT CHANGE UP (ref 3.5–5.3)
POTASSIUM SERPL-SCNC: 3.6 MMOL/L — SIGNIFICANT CHANGE UP (ref 3.5–5.3)
RBC # BLD: 4.4 M/UL — SIGNIFICANT CHANGE UP (ref 4.2–5.8)
RBC # FLD: 13.2 % — SIGNIFICANT CHANGE UP (ref 10.3–14.5)
SODIUM SERPL-SCNC: 132 MMOL/L — LOW (ref 135–145)
WBC # BLD: 24.33 K/UL — HIGH (ref 3.8–10.5)
WBC # FLD AUTO: 24.33 K/UL — HIGH (ref 3.8–10.5)

## 2022-10-14 PROCEDURE — 71045 X-RAY EXAM CHEST 1 VIEW: CPT | Mod: 26

## 2022-10-14 PROCEDURE — 99232 SBSQ HOSP IP/OBS MODERATE 35: CPT | Mod: FS

## 2022-10-14 PROCEDURE — 99233 SBSQ HOSP IP/OBS HIGH 50: CPT

## 2022-10-14 PROCEDURE — 99232 SBSQ HOSP IP/OBS MODERATE 35: CPT | Mod: 57

## 2022-10-14 RX ADMIN — Medication 1 MILLIGRAM(S): at 00:23

## 2022-10-14 RX ADMIN — Medication 1 PATCH: at 19:00

## 2022-10-14 RX ADMIN — Medication 1 MILLIGRAM(S): at 23:23

## 2022-10-14 RX ADMIN — Medication 1 DROP(S): at 17:44

## 2022-10-14 RX ADMIN — CLOZAPINE 250 MILLIGRAM(S): 150 TABLET, ORALLY DISINTEGRATING ORAL at 00:24

## 2022-10-14 RX ADMIN — SENNA PLUS 2 TABLET(S): 8.6 TABLET ORAL at 23:24

## 2022-10-14 RX ADMIN — PIPERACILLIN AND TAZOBACTAM 25 GRAM(S): 4; .5 INJECTION, POWDER, LYOPHILIZED, FOR SOLUTION INTRAVENOUS at 13:07

## 2022-10-14 RX ADMIN — PIPERACILLIN AND TAZOBACTAM 25 GRAM(S): 4; .5 INJECTION, POWDER, LYOPHILIZED, FOR SOLUTION INTRAVENOUS at 05:56

## 2022-10-14 RX ADMIN — Medication 1 DROP(S): at 06:12

## 2022-10-14 RX ADMIN — SENNA PLUS 2 TABLET(S): 8.6 TABLET ORAL at 00:24

## 2022-10-14 RX ADMIN — Medication 1 MILLIGRAM(S): at 05:56

## 2022-10-14 RX ADMIN — Medication 1 MILLIGRAM(S): at 13:06

## 2022-10-14 RX ADMIN — LITHIUM CARBONATE 450 MILLIGRAM(S): 300 TABLET, EXTENDED RELEASE ORAL at 05:49

## 2022-10-14 RX ADMIN — PIPERACILLIN AND TAZOBACTAM 25 GRAM(S): 4; .5 INJECTION, POWDER, LYOPHILIZED, FOR SOLUTION INTRAVENOUS at 23:24

## 2022-10-14 RX ADMIN — CLOZAPINE 250 MILLIGRAM(S): 150 TABLET, ORALLY DISINTEGRATING ORAL at 23:24

## 2022-10-14 RX ADMIN — Medication 1 PATCH: at 10:00

## 2022-10-14 RX ADMIN — LITHIUM CARBONATE 450 MILLIGRAM(S): 300 TABLET, EXTENDED RELEASE ORAL at 17:45

## 2022-10-14 RX ADMIN — Medication 1 PATCH: at 14:10

## 2022-10-14 NOTE — PROGRESS NOTE ADULT - SUBJECTIVE AND OBJECTIVE BOX
Patient is a 47y old  Male who presents with a chief complaint of Large Left loculated pleural effusion (14 Oct 2022 11:09)    Patient seen and examined at bedside.      ALLERGIES:  No Known Allergies    MEDICATIONS  (STANDING):  atropine 1% Solution 1 Drop(s) Both EYES two times a day  clonazePAM  Tablet 1 milliGRAM(s) Oral three times a day  cloZAPine 250 milliGRAM(s) Oral at bedtime  lithium 450 milliGRAM(s) Oral two times a day  nicotine -  14 mG/24Hr(s) Patch 1 Patch Transdermal daily  piperacillin/tazobactam IVPB.. 3.375 Gram(s) IV Intermittent every 8 hours  senna 2 Tablet(s) Oral at bedtime    MEDICATIONS  (PRN):  acetaminophen     Tablet .. 650 milliGRAM(s) Oral every 6 hours PRN Temp greater or equal to 38C (100.4F), Mild Pain (1 - 3)  aluminum hydroxide/magnesium hydroxide/simethicone Suspension 30 milliLiter(s) Oral every 4 hours PRN Dyspepsia  melatonin 3 milliGRAM(s) Oral at bedtime PRN Insomnia  ondansetron Injectable 4 milliGRAM(s) IV Push every 8 hours PRN Nausea and/or Vomiting    Vital Signs Last 24 Hrs  T(F): 98.1 (14 Oct 2022 11:59), Max: 99.8 (13 Oct 2022 15:54)  HR: 113 (14 Oct 2022 11:59) (104 - 121)  BP: 113/72 (14 Oct 2022 11:59) (110/70 - 130/86)  RR: 20 (14 Oct 2022 11:59) (20 - 20)  SpO2: 91% (14 Oct 2022 11:59) (91% - 96%)  I&O's Summary    13 Oct 2022 07:01  -  14 Oct 2022 07:00  --------------------------------------------------------  IN: 0 mL / OUT: 240 mL / NET: -240 mL      PHYSICAL EXAM:  General: NAD, laying in bed   ENT: MMM, no thrush  Neck: Supple, No JVD  Lungs: decreased breath sounds b/l +chest tube   Cardio: +s1/s2  Abdomen: Soft, Nontender, Nondistended; Bowel sounds present  Extremities: No calf tenderness, No pitting edema    LABS:                        13.0   24.33 )-----------( 425      ( 14 Oct 2022 03:34 )             40.4     10-14    132  |  97  |  11.1  ----------------------------<  131  3.6   |  23.0  |  0.85    Ca    9.0      14 Oct 2022 03:34    TPro  6.3  /  Alb  2.6  /  TBili  0.7  /  DBili  x   /  AST  9   /  ALT  9   /  AlkPhos  96  10-13    PT/INR - ( 12 Oct 2022 12:11 )   PT: 21.0 sec;   INR: 1.80 ratio    PTT - ( 12 Oct 2022 12:11 )  PTT:33.0 sec    10-13 Chol 74 mg/dL LDL -- HDL 28 mg/dL Trig 122 mg/dL    00:07 - VBG - pH: 7.430 | pCO2: 39    | pO2: 141   | Lactate: 1.30   21:50 - VBG - pH: 7.440 | pCO2: 35    | pO2: 81    | Lactate: 2.10     Urinalysis Basic - ( 12 Oct 2022 01:20 )  Color: Yellow / Appearance: Clear / S.020 / pH: x  Gluc: x / Ketone: Trace  / Bili: Negative / Urobili: Negative mg/dL   Blood: x / Protein: 15 / Nitrite: Negative   Leuk Esterase: Trace / RBC: 3-5 /HPF / WBC 3-5 /HPF   Sq Epi: x / Non Sq Epi: Negative / Bacteria: Few    Cultures  Culture - Fungal, Body Fluid (collected 12 Oct 2022 12:02)  Source: Pleural Fl Pleural Fluid  Preliminary Report (13 Oct 2022 10:55):    Testing in progress    Culture - Body Fluid with Gram Stain (collected 12 Oct 2022 12:02)  Source: Pleural Fl Pleural Fluid  Gram Stain (12 Oct 2022 22:32):    polymorphonuclear leukocytes seen    No organisms seen    by cytocentrifuge  Preliminary Report (13 Oct 2022 13:14):    No growth    Culture - Urine (collected 12 Oct 2022 01:20)  Source: Clean Catch Clean Catch (Midstream)  Final Report (13 Oct 2022 11:19):    <10,000 CFU/mL Normal Urogenital Carey    Culture - Blood (collected 11 Oct 2022 21:50)  Source: .Blood Blood-Peripheral  Preliminary Report (13 Oct 2022 04:02):    No growth to date.    Culture - Blood (collected 11 Oct 2022 21:50)  Source: .Blood Blood-Peripheral  Preliminary Report (13 Oct 2022 04:02):    No growth to date.    RADIOLOGY & ADDITIONAL TESTS:  - no new tests

## 2022-10-14 NOTE — PROGRESS NOTE ADULT - SUBJECTIVE AND OBJECTIVE BOX
Subjective: "Good morning"  OOB chair      V/S  T(C): 36.9 (10-14-22 @ 08:11), Max: 37.7 (10-13-22 @ 15:54)  HR: 111 (10-14-22 @ 08:11) (93 - 121)  BP: 115/75 (10-14-22 @ 08:11) (110/70 - 130/86)  RR: 20 (10-14-22 @ 08:11) (20 - 22)  SpO2: 92% (10-14-22 @ 08:11) (91% - 96%)      CHEST TUBE:  L pigtail waterseal    OUTPUT:     240        per 24 hours     Drainage:  serosang  AIR LEAKS:  [ ] YES [ x] NO    I&O's Detail    13 Oct 2022 07:01  -  14 Oct 2022 07:00  --------------------------------------------------------  IN:  Total IN: 0 mL    OUT:    Chest Tube (mL): 240 mL  Total OUT: 240 mL    Total NET: -240 mL          10-13-22 @ 07:01  -  10-14-22 @ 07:00  --------------------------------------------------------  IN: 0 mL / OUT: 240 mL / NET: -240 mL      MEDICATIONS  (STANDING):  atropine 1% Solution 1 Drop(s) Both EYES two times a day  clonazePAM  Tablet 1 milliGRAM(s) Oral three times a day  cloZAPine 250 milliGRAM(s) Oral at bedtime  lithium 450 milliGRAM(s) Oral two times a day  nicotine -  14 mG/24Hr(s) Patch 1 Patch Transdermal daily  piperacillin/tazobactam IVPB.. 3.375 Gram(s) IV Intermittent every 8 hours  senna 2 Tablet(s) Oral at bedtime      10-14    132<L>  |  97<L>  |  11.1  ----------------------------<  131<H>  3.6   |  23.0  |  0.85    Ca    9.0      14 Oct 2022 03:34    TPro  6.3<L>  /  Alb  2.6<L>  /  TBili  0.7  /  DBili  x   /  AST  9   /  ALT  9   /  AlkPhos  96  10-13                               13.0   24.33 )-----------( 425      ( 14 Oct 2022 03:34 )             40.4        PT/INR - ( 12 Oct 2022 12:11 )   PT: 21.0 sec;   INR: 1.80 ratio         PTT - ( 12 Oct 2022 12:11 )  PTT:33.0 sec         CAPILLARY BLOOD GLUCOSE               CXR:      Physical Exam:    General: flat affect,  NAD    Neurology: AOx3, nonfocal, HUNTER x 4    Respiratory: no accessory muscle use, crackles at left mid/lower chest. No wheezing, rhonchi    CV: RRR, S1S2, no murmurs, rubs or gallops.     Abdominal: +BS. Soft, NT, ND     Extremities: no edema b/l lower extremities, + peripheral pulses    Tubes: left sided chest tube with dressing c/d/i, serous drainage, no air leak        PAST MEDICAL & SURGICAL HISTORY:  Schizoaffective disorder      Bipolar disorder      History of tobacco abuse      S/P tonsillectomy

## 2022-10-14 NOTE — PROGRESS NOTE ADULT - ASSESSMENT
ASSESSMENT:   47 year old male patient, current [facility] resident, admitted to medicine with c/o left sided abdominal pain/chest discomfort X 4 days. +Dry cough. Patient found with Leukocytosis (WBC >20), low grade fever, and CT chest showing Large Left Loculated Pleural Effusion. Patient in no acute respiratory distress, with SpO2 stable on room air.   10/12 left percutaneous chest tube placed  10/14 Maintain L pigtail Poss pleurex next week

## 2022-10-15 LAB
ANION GAP SERPL CALC-SCNC: 10 MMOL/L — SIGNIFICANT CHANGE UP (ref 5–17)
BUN SERPL-MCNC: 8.5 MG/DL — SIGNIFICANT CHANGE UP (ref 8–20)
CALCIUM SERPL-MCNC: 9 MG/DL — SIGNIFICANT CHANGE UP (ref 8.4–10.5)
CHLORIDE SERPL-SCNC: 95 MMOL/L — LOW (ref 98–107)
CO2 SERPL-SCNC: 27 MMOL/L — SIGNIFICANT CHANGE UP (ref 22–29)
CREAT SERPL-MCNC: 0.65 MG/DL — SIGNIFICANT CHANGE UP (ref 0.5–1.3)
EGFR: 118 ML/MIN/1.73M2 — SIGNIFICANT CHANGE UP
GLUCOSE SERPL-MCNC: 118 MG/DL — HIGH (ref 70–99)
HCT VFR BLD CALC: 36.8 % — LOW (ref 39–50)
HGB BLD-MCNC: 12 G/DL — LOW (ref 13–17)
MCHC RBC-ENTMCNC: 30 PG — SIGNIFICANT CHANGE UP (ref 27–34)
MCHC RBC-ENTMCNC: 32.6 GM/DL — SIGNIFICANT CHANGE UP (ref 32–36)
MCV RBC AUTO: 92 FL — SIGNIFICANT CHANGE UP (ref 80–100)
PLATELET # BLD AUTO: 454 K/UL — HIGH (ref 150–400)
POTASSIUM SERPL-MCNC: 3.5 MMOL/L — SIGNIFICANT CHANGE UP (ref 3.5–5.3)
POTASSIUM SERPL-SCNC: 3.5 MMOL/L — SIGNIFICANT CHANGE UP (ref 3.5–5.3)
RBC # BLD: 4 M/UL — LOW (ref 4.2–5.8)
RBC # FLD: 13.2 % — SIGNIFICANT CHANGE UP (ref 10.3–14.5)
SODIUM SERPL-SCNC: 132 MMOL/L — LOW (ref 135–145)
WBC # BLD: 17.29 K/UL — HIGH (ref 3.8–10.5)
WBC # FLD AUTO: 17.29 K/UL — HIGH (ref 3.8–10.5)

## 2022-10-15 PROCEDURE — 99231 SBSQ HOSP IP/OBS SF/LOW 25: CPT | Mod: FS

## 2022-10-15 PROCEDURE — 99233 SBSQ HOSP IP/OBS HIGH 50: CPT

## 2022-10-15 PROCEDURE — 71045 X-RAY EXAM CHEST 1 VIEW: CPT | Mod: 26

## 2022-10-15 RX ORDER — BENZOCAINE AND MENTHOL 5; 1 G/100ML; G/100ML
1 LIQUID ORAL EVERY 4 HOURS
Refills: 0 | Status: DISCONTINUED | OUTPATIENT
Start: 2022-10-15 | End: 2022-10-21

## 2022-10-15 RX ADMIN — Medication 1 MILLIGRAM(S): at 21:27

## 2022-10-15 RX ADMIN — CLOZAPINE 250 MILLIGRAM(S): 150 TABLET, ORALLY DISINTEGRATING ORAL at 22:14

## 2022-10-15 RX ADMIN — Medication 1 PATCH: at 21:28

## 2022-10-15 RX ADMIN — SENNA PLUS 2 TABLET(S): 8.6 TABLET ORAL at 21:27

## 2022-10-15 RX ADMIN — Medication 1 PATCH: at 11:12

## 2022-10-15 RX ADMIN — Medication 1 PATCH: at 12:00

## 2022-10-15 RX ADMIN — Medication 1 MILLIGRAM(S): at 05:48

## 2022-10-15 RX ADMIN — Medication 1 DROP(S): at 05:48

## 2022-10-15 RX ADMIN — LITHIUM CARBONATE 450 MILLIGRAM(S): 300 TABLET, EXTENDED RELEASE ORAL at 05:48

## 2022-10-15 RX ADMIN — PIPERACILLIN AND TAZOBACTAM 25 GRAM(S): 4; .5 INJECTION, POWDER, LYOPHILIZED, FOR SOLUTION INTRAVENOUS at 21:27

## 2022-10-15 RX ADMIN — LITHIUM CARBONATE 450 MILLIGRAM(S): 300 TABLET, EXTENDED RELEASE ORAL at 17:34

## 2022-10-15 RX ADMIN — Medication 1 MILLIGRAM(S): at 13:14

## 2022-10-15 RX ADMIN — Medication 1 PATCH: at 11:49

## 2022-10-15 RX ADMIN — PIPERACILLIN AND TAZOBACTAM 25 GRAM(S): 4; .5 INJECTION, POWDER, LYOPHILIZED, FOR SOLUTION INTRAVENOUS at 13:20

## 2022-10-15 RX ADMIN — Medication 1 DROP(S): at 17:33

## 2022-10-15 RX ADMIN — PIPERACILLIN AND TAZOBACTAM 25 GRAM(S): 4; .5 INJECTION, POWDER, LYOPHILIZED, FOR SOLUTION INTRAVENOUS at 05:49

## 2022-10-15 NOTE — PROGRESS NOTE ADULT - ASSESSMENT
47 year old male patient, current [facility] resident, admitted to medicine with c/o left sided abdominal pain/chest discomfort X 4 days. +Dry cough. Patient found with Leukocytosis (WBC >20), low grade fever, and CT chest showing Large Left Loculated Pleural Effusion. Patient in no acute respiratory distress, with SpO2 stable on room air.   10/12 left percutaneous chest tube placed  10/14 Maintain L pigtail Poss pleurex next week

## 2022-10-15 NOTE — PROGRESS NOTE ADULT - SUBJECTIVE AND OBJECTIVE BOX
CHIEF COMPLAINT/INTERVAL HISTORY:    Patient is a 47y old  Male who presents with a chief complaint of Large Left loculated pleural effusion (15 Oct 2022 08:49)    SUBJECTIVE & OBJECTIVE: Pt seen and examined at bedside. No overnight events. Chest tube with continued output. Continue to monitor CXR. May require a pleurex early next week.    ROS: No chest pain, palpitations, SOB, light headedness, dizziness, headache, nausea/vomiting, fevers/chills, abdominal pain, dysuria or increased urinary frequency.    ICU Vital Signs Last 24 Hrs  T(C): 37 (15 Oct 2022 16:00), Max: 37.1 (14 Oct 2022 23:02)  T(F): 98.6 (15 Oct 2022 16:00), Max: 98.8 (14 Oct 2022 23:02)  HR: 104 (15 Oct 2022 16:00) (102 - 120)  BP: 105/71 (15 Oct 2022 16:00) (105/68 - 137/82)  RR: 18 (15 Oct 2022 16:00) (16 - 20)  SpO2: 97% (15 Oct 2022 16:00) (88% - 98%)    O2 Parameters below as of 15 Oct 2022 16:00  Patient On (Oxygen Delivery Method): nasal cannula  O2 Flow (L/min): 3    MEDICATIONS  (STANDING):  atropine 1% Solution 1 Drop(s) Both EYES two times a day  clonazePAM  Tablet 1 milliGRAM(s) Oral three times a day  cloZAPine 250 milliGRAM(s) Oral at bedtime  lithium 450 milliGRAM(s) Oral two times a day  nicotine -  14 mG/24Hr(s) Patch 1 Patch Transdermal daily  piperacillin/tazobactam IVPB.. 3.375 Gram(s) IV Intermittent every 8 hours  senna 2 Tablet(s) Oral at bedtime    MEDICATIONS  (PRN):  acetaminophen     Tablet .. 650 milliGRAM(s) Oral every 6 hours PRN Temp greater or equal to 38C (100.4F), Mild Pain (1 - 3)  aluminum hydroxide/magnesium hydroxide/simethicone Suspension 30 milliLiter(s) Oral every 4 hours PRN Dyspepsia  benzocaine 15 mG/menthol 3.6 mG Lozenge 1 Lozenge Oral every 4 hours PRN Sore Throat  melatonin 3 milliGRAM(s) Oral at bedtime PRN Insomnia  ondansetron Injectable 4 milliGRAM(s) IV Push every 8 hours PRN Nausea and/or Vomiting      LABS:                        12.0   17.29 )-----------( 454      ( 15 Oct 2022 07:22 )             36.8     10-15    132<L>  |  95<L>  |  8.5  ----------------------------<  118<H>  3.5   |  27.0  |  0.65    Ca    9.0      15 Oct 2022 07:22      PHYSICAL EXAM:    GENERAL: middle aged male, sitting in chair, NAD  HEAD:  Atraumatic, Normocephalic  EYES: EOMI, PERRLA, conjunctiva and sclera clear  ENMT: Moist mucous membranes  NECK: Supple   NERVOUS SYSTEM:  Alert & Oriented X2  CHEST/LUNG: diminished breath sounds;  + chest tube  HEART: Regular rate and rhythm; + S1/S2  ABDOMEN: Soft, Nontender, Nondistended; Bowel sounds present  EXTREMITIES:  no pedal edema

## 2022-10-15 NOTE — PROGRESS NOTE ADULT - SUBJECTIVE AND OBJECTIVE BOX
Subjective: Patient seen and assessed for pleural effusion. Patient states "    Pertinent events of the past 24 hours: Uneventful, recovering as expected    VITAL SIGNS  Vital Signs Last 24 Hrs  T(C): 36.5 (10-15-22 @ 08:38), Max: 37.2 (10-14-22 @ 15:30)  T(F): 97.7 (10-15-22 @ 08:38), Max: 99 (10-14-22 @ 15:30)  HR: 102 (10-15-22 @ 08:38) (102 - 120)  BP: 105/68 (10-15-22 @ 08:38) (105/68 - 137/82)  RR: 20 (10-15-22 @ 08:38) (16 - 20)  SpO2: 92% (10-15-22 @ 08:38) (88% - 96%)  on (O2)              MEDICATIONS  acetaminophen     Tablet .. 650 milliGRAM(s) Oral every 6 hours PRN  aluminum hydroxide/magnesium hydroxide/simethicone Suspension 30 milliLiter(s) Oral every 4 hours PRN  atropine 1% Solution 1 Drop(s) Both EYES two times a day  benzocaine 15 mG/menthol 3.6 mG Lozenge 1 Lozenge Oral every 4 hours PRN  clonazePAM  Tablet 1 milliGRAM(s) Oral three times a day  cloZAPine 250 milliGRAM(s) Oral at bedtime  lithium 450 milliGRAM(s) Oral two times a day  melatonin 3 milliGRAM(s) Oral at bedtime PRN  nicotine -  14 mG/24Hr(s) Patch 1 Patch Transdermal daily  ondansetron Injectable 4 milliGRAM(s) IV Push every 8 hours PRN  piperacillin/tazobactam IVPB.. 3.375 Gram(s) IV Intermittent every 8 hours  senna 2 Tablet(s) Oral at bedtime    PHYSICAL EXAM  to follow     Intake and Output  10-14 @ 07:01  -  10-15 @ 07:00  --------------------------------------------------------  IN: 230 mL / OUT: 387 mL / NET: -157 mL    Weights:  Daily     Daily   Admit Wt: Drug Dosing Weight  Height (cm): 182.9 (11 Oct 2022 20:23)  Weight (kg): 88.5 (11 Oct 2022 20:23)  BMI (kg/m2): 26.5 (11 Oct 2022 20:23)  BSA (m2): 2.11 (11 Oct 2022 20:23)    All laboratory results, radiology and medications reviewed.    LABS  10-15    132<L>  |  95<L>  |  8.5  ----------------------------<  118<H>  3.5   |  27.0  |  0.65    Ca    9.0      15 Oct 2022 07:22                                   12.0   17.29 )-----------( 454      ( 15 Oct 2022 07:22 )             36.8         Last CXR: < from: Xray Chest 1 View- PORTABLE-Routine (Xray Chest 1 View- PORTABLE-Routine in AM.) (10.14.22 @ 06:44) >    FINDINGS:    Pigtail chest tube at left base. Slight decreased size of left pleural   effusion. Associated atelectasis. There is no gross left pneumothorax.   The right lung is clear. The heart size appears stable.    Portable chest 10/14/2022 at 6:07 AM    FINDINGS: Pigtail chest tube at left lung base. The left effusion is   larger as well as the associated consolidation. There is no gross   pneumothorax. Right lung remains clear.    Impression:    The left pleural effusion appears to be reaccumulating on today's film.   Recommend evaluating functionality of chest tube.    < end of copied text >   Subjective: Patient seen and assessed for pleural effusion. Patient states "My breathing is much better now with the oxygen" Patient Pt denies chest pain, palpitations, dizziness, headache, shortness of breath, abdominal pain or N/V/D/C.    Pertinent events of the past 24 hours: CT drainage slowing down, breath sounds remains course     VITAL SIGNS  Vital Signs Last 24 Hrs  T(C): 36.5 (10-15-22 @ 08:38), Max: 37.2 (10-14-22 @ 15:30)  T(F): 97.7 (10-15-22 @ 08:38), Max: 99 (10-14-22 @ 15:30)  HR: 102 (10-15-22 @ 08:38) (102 - 120)  BP: 105/68 (10-15-22 @ 08:38) (105/68 - 137/82)  RR: 20 (10-15-22 @ 08:38) (16 - 20)  SpO2: 92% (10-15-22 @ 08:38) (88% - 96%)  on (O2)              MEDICATIONS  acetaminophen     Tablet .. 650 milliGRAM(s) Oral every 6 hours PRN  aluminum hydroxide/magnesium hydroxide/simethicone Suspension 30 milliLiter(s) Oral every 4 hours PRN  atropine 1% Solution 1 Drop(s) Both EYES two times a day  benzocaine 15 mG/menthol 3.6 mG Lozenge 1 Lozenge Oral every 4 hours PRN  clonazePAM  Tablet 1 milliGRAM(s) Oral three times a day  cloZAPine 250 milliGRAM(s) Oral at bedtime  lithium 450 milliGRAM(s) Oral two times a day  melatonin 3 milliGRAM(s) Oral at bedtime PRN  nicotine -  14 mG/24Hr(s) Patch 1 Patch Transdermal daily  ondansetron Injectable 4 milliGRAM(s) IV Push every 8 hours PRN  piperacillin/tazobactam IVPB.. 3.375 Gram(s) IV Intermittent every 8 hours  senna 2 Tablet(s) Oral at bedtime    PHYSICAL EXAM  Constitutional: NAD  Neuro: A+O x 3, non-focal, speech clear and intact  CV: +S1S2  Pulm/chest: right CTA, left diminished breath sounds, no accessory muscle use noted  Abd: +BS, soft, NT, ND  Ext: HUNTER x 4, no edema  Skin: warm, well perfused  Psych: calm  Drains: Left PTC to H2O seal draining serous fluid, no air leak, no SQ air    Intake and Output  10-14 @ 07:01  -  10-15 @ 07:00  --------------------------------------------------------  IN: 230 mL / OUT: 387 mL / NET: -157 mL    Weights:  Daily     Daily   Admit Wt: Drug Dosing Weight  Height (cm): 182.9 (11 Oct 2022 20:23)  Weight (kg): 88.5 (11 Oct 2022 20:23)  BMI (kg/m2): 26.5 (11 Oct 2022 20:23)  BSA (m2): 2.11 (11 Oct 2022 20:23)    All laboratory results, radiology and medications reviewed.    LABS  10-15    132<L>  |  95<L>  |  8.5  ----------------------------<  118<H>  3.5   |  27.0  |  0.65    Ca    9.0      15 Oct 2022 07:22                                   12.0   17.29 )-----------( 454      ( 15 Oct 2022 07:22 )             36.8         Last CXR: < from: Xray Chest 1 View- PORTABLE-Routine (Xray Chest 1 View- PORTABLE-Routine in AM.) (10.14.22 @ 06:44) >    FINDINGS:    Pigtail chest tube at left base. Slight decreased size of left pleural   effusion. Associated atelectasis. There is no gross left pneumothorax.   The right lung is clear. The heart size appears stable.    Portable chest 10/14/2022 at 6:07 AM    FINDINGS: Pigtail chest tube at left lung base. The left effusion is   larger as well as the associated consolidation. There is no gross   pneumothorax. Right lung remains clear.    Impression:    The left pleural effusion appears to be reaccumulating on today's film.   Recommend evaluating functionality of chest tube.    < end of copied text >

## 2022-10-15 NOTE — PROGRESS NOTE ADULT - ASSESSMENT
Patient is a 47 year old male with PMH of Schizoaffective disorder, bipolar disorder and tobacco abuse presents with complaints of left sided chest pain pain and shortness of breath and found to have a large left sided loculated effusion.    #Large loculated left sided pleural effusion; unclear etiology  - s/p left chest tube with continued output   - cytology negative for malignant cells; but remarkable for acute inflammation  - pleural fluid cultures negative  - daily CXR  - continue empiric zosyn to complete 5 day course  - leukocytosis improving  - check procal  - CTS on board; possible pleurex early next week    #Schizoaffective disorder/Bipolar disorder  - continue clonazepam, lithium, aripiprazole (IM once monthly 10/5/2022), clozapine  - psych recs appreciated    #Tobacco abuse  - Nicotine patch    #Leukocytosis possibly due to PNA with parapneumonic effusion  -however, pleural fluid cultures are negative  -urine culture negative  -blood cultures negative  -leukocytosis improving daily therefore continue empiric zosyn  -monitor fever curve  -CT chest with large effusion and compressive atelectasis    #Pulmonary nodule  -continue outpatient surveillance    #DVT Prophylaxis - SCDs    Dispo - Daily CXR; possible pleurex early next week. Remains acute.

## 2022-10-15 NOTE — PROGRESS NOTE ADULT - PROBLEM SELECTOR PLAN 1
Continuous SpO2 monitoring.  Patient currently stable with SpO2 >92% on room air while sitting/talking  Maintain SpO2 >92%. Supplement with O2 therapy PRN.   Daily CXR while tube is in place  Maintain chest tube to water seal  Exudative effusion per light's criteria, however culture and cytology negative  fungal culture pending     Plan of care discussed with Dr. Abreu during AM rounds Continuous SpO2 monitoring.  Patient currently stable with SpO2 >92% on room air while sitting/talking  Maintain SpO2 >92%. Supplement with O2 therapy PRN.   Daily CXR while tube is in place  Maintain chest tube to water seal  Exudative effusion per light's criteria, however culture and cytology negative  fungal culture pending   Will consider pleurx next week if continues to drain   Rest of care per primary team  Plan discussed with Thoracic Surgery Attending/Team in AM rounds Continuous SpO2 monitoring.  Patient currently stable with SpO2 >92% on room air while sitting/talking  Maintain SpO2 >92%. Supplement with O2 therapy PRN.   Daily CXR while tube is in place  Maintain chest tube to water seal  Exudative effusion per light's criteria, however culture and cytology negative  fungal culture pending   Will consider pleurx next week if continues to drain   Encourage cough and deep breathing/IS use   Rest of care per primary team  Plan discussed with Thoracic Surgery Attending/Team in AM rounds

## 2022-10-16 LAB
ANION GAP SERPL CALC-SCNC: 10 MMOL/L — SIGNIFICANT CHANGE UP (ref 5–17)
APPEARANCE UR: CLEAR — SIGNIFICANT CHANGE UP
BACTERIA # UR AUTO: ABNORMAL
BASOPHILS # BLD AUTO: 0.05 K/UL — SIGNIFICANT CHANGE UP (ref 0–0.2)
BASOPHILS NFR BLD AUTO: 0.3 % — SIGNIFICANT CHANGE UP (ref 0–2)
BILIRUB UR-MCNC: NEGATIVE — SIGNIFICANT CHANGE UP
BUN SERPL-MCNC: 7 MG/DL — LOW (ref 8–20)
CALCIUM SERPL-MCNC: 9 MG/DL — SIGNIFICANT CHANGE UP (ref 8.4–10.5)
CHLORIDE SERPL-SCNC: 96 MMOL/L — LOW (ref 98–107)
CO2 SERPL-SCNC: 28 MMOL/L — SIGNIFICANT CHANGE UP (ref 22–29)
COLOR SPEC: YELLOW — SIGNIFICANT CHANGE UP
COMMENT - URINE: SIGNIFICANT CHANGE UP
CREAT SERPL-MCNC: 0.59 MG/DL — SIGNIFICANT CHANGE UP (ref 0.5–1.3)
DIFF PNL FLD: NEGATIVE — SIGNIFICANT CHANGE UP
EGFR: 122 ML/MIN/1.73M2 — SIGNIFICANT CHANGE UP
EOSINOPHIL # BLD AUTO: 0.32 K/UL — SIGNIFICANT CHANGE UP (ref 0–0.5)
EOSINOPHIL NFR BLD AUTO: 2.1 % — SIGNIFICANT CHANGE UP (ref 0–6)
EPI CELLS # UR: SIGNIFICANT CHANGE UP
GLUCOSE SERPL-MCNC: 115 MG/DL — HIGH (ref 70–99)
GLUCOSE UR QL: NEGATIVE MG/DL — SIGNIFICANT CHANGE UP
HCT VFR BLD CALC: 35.9 % — LOW (ref 39–50)
HCT VFR BLD CALC: 36.1 % — LOW (ref 39–50)
HGB BLD-MCNC: 11.5 G/DL — LOW (ref 13–17)
HGB BLD-MCNC: 11.5 G/DL — LOW (ref 13–17)
IMM GRANULOCYTES NFR BLD AUTO: 0.6 % — SIGNIFICANT CHANGE UP (ref 0–0.9)
KETONES UR-MCNC: NEGATIVE — SIGNIFICANT CHANGE UP
LEUKOCYTE ESTERASE UR-ACNC: NEGATIVE — SIGNIFICANT CHANGE UP
LYMPHOCYTES # BLD AUTO: 1.43 K/UL — SIGNIFICANT CHANGE UP (ref 1–3.3)
LYMPHOCYTES # BLD AUTO: 9.2 % — LOW (ref 13–44)
MAGNESIUM SERPL-MCNC: 2.1 MG/DL — SIGNIFICANT CHANGE UP (ref 1.8–2.6)
MCHC RBC-ENTMCNC: 29.7 PG — SIGNIFICANT CHANGE UP (ref 27–34)
MCHC RBC-ENTMCNC: 32 GM/DL — SIGNIFICANT CHANGE UP (ref 32–36)
MCV RBC AUTO: 92.8 FL — SIGNIFICANT CHANGE UP (ref 80–100)
MONOCYTES # BLD AUTO: 1.35 K/UL — HIGH (ref 0–0.9)
MONOCYTES NFR BLD AUTO: 8.7 % — SIGNIFICANT CHANGE UP (ref 2–14)
NEUTROPHILS # BLD AUTO: 12.31 K/UL — HIGH (ref 1.8–7.4)
NEUTROPHILS NFR BLD AUTO: 79.1 % — HIGH (ref 43–77)
NITRITE UR-MCNC: NEGATIVE — SIGNIFICANT CHANGE UP
PH UR: 7 — SIGNIFICANT CHANGE UP (ref 5–8)
PHOSPHATE SERPL-MCNC: 2.6 MG/DL — SIGNIFICANT CHANGE UP (ref 2.4–4.7)
PLATELET # BLD AUTO: 455 K/UL — HIGH (ref 150–400)
POTASSIUM SERPL-MCNC: 3.6 MMOL/L — SIGNIFICANT CHANGE UP (ref 3.5–5.3)
POTASSIUM SERPL-SCNC: 3.6 MMOL/L — SIGNIFICANT CHANGE UP (ref 3.5–5.3)
PROCALCITONIN SERPL-MCNC: 0.19 NG/ML — HIGH (ref 0.02–0.1)
PROT UR-MCNC: NEGATIVE — SIGNIFICANT CHANGE UP
RBC # BLD: 3.87 M/UL — LOW (ref 4.2–5.8)
RBC # FLD: 13.4 % — SIGNIFICANT CHANGE UP (ref 10.3–14.5)
RBC CASTS # UR COMP ASSIST: SIGNIFICANT CHANGE UP /HPF (ref 0–4)
SODIUM SERPL-SCNC: 133 MMOL/L — LOW (ref 135–145)
SP GR SPEC: 1 — LOW (ref 1.01–1.02)
UROBILINOGEN FLD QL: NEGATIVE MG/DL — SIGNIFICANT CHANGE UP
WBC # BLD: 15.56 K/UL — HIGH (ref 3.8–10.5)
WBC # FLD AUTO: 15.56 K/UL — HIGH (ref 3.8–10.5)
WBC UR QL: SIGNIFICANT CHANGE UP /HPF (ref 0–5)

## 2022-10-16 PROCEDURE — 99233 SBSQ HOSP IP/OBS HIGH 50: CPT

## 2022-10-16 PROCEDURE — 71045 X-RAY EXAM CHEST 1 VIEW: CPT | Mod: 26

## 2022-10-16 RX ADMIN — CLOZAPINE 250 MILLIGRAM(S): 150 TABLET, ORALLY DISINTEGRATING ORAL at 21:22

## 2022-10-16 RX ADMIN — LITHIUM CARBONATE 450 MILLIGRAM(S): 300 TABLET, EXTENDED RELEASE ORAL at 17:47

## 2022-10-16 RX ADMIN — PIPERACILLIN AND TAZOBACTAM 25 GRAM(S): 4; .5 INJECTION, POWDER, LYOPHILIZED, FOR SOLUTION INTRAVENOUS at 05:47

## 2022-10-16 RX ADMIN — PIPERACILLIN AND TAZOBACTAM 25 GRAM(S): 4; .5 INJECTION, POWDER, LYOPHILIZED, FOR SOLUTION INTRAVENOUS at 14:33

## 2022-10-16 RX ADMIN — Medication 1 DROP(S): at 05:46

## 2022-10-16 RX ADMIN — Medication 1 MILLIGRAM(S): at 14:32

## 2022-10-16 RX ADMIN — BENZOCAINE AND MENTHOL 1 LOZENGE: 5; 1 LIQUID ORAL at 01:43

## 2022-10-16 RX ADMIN — LITHIUM CARBONATE 450 MILLIGRAM(S): 300 TABLET, EXTENDED RELEASE ORAL at 05:46

## 2022-10-16 RX ADMIN — Medication 1 PATCH: at 12:28

## 2022-10-16 RX ADMIN — Medication 1 PATCH: at 11:26

## 2022-10-16 RX ADMIN — Medication 1 MILLIGRAM(S): at 21:23

## 2022-10-16 RX ADMIN — Medication 1 PATCH: at 21:20

## 2022-10-16 RX ADMIN — Medication 1 MILLIGRAM(S): at 05:46

## 2022-10-16 RX ADMIN — SENNA PLUS 2 TABLET(S): 8.6 TABLET ORAL at 21:22

## 2022-10-16 RX ADMIN — PIPERACILLIN AND TAZOBACTAM 25 GRAM(S): 4; .5 INJECTION, POWDER, LYOPHILIZED, FOR SOLUTION INTRAVENOUS at 21:23

## 2022-10-16 RX ADMIN — Medication 1 DROP(S): at 17:46

## 2022-10-16 RX ADMIN — Medication 1 PATCH: at 07:26

## 2022-10-16 NOTE — PROGRESS NOTE ADULT - SUBJECTIVE AND OBJECTIVE BOX
CHIEF COMPLAINT/INTERVAL HISTORY:    Patient is a 47y old  Male who presents with a chief complaint of Large Left loculated pleural effusion (15 Oct 2022 18:00)    SUBJECTIVE & OBJECTIVE: Pt seen and examined at bedside. No overnight events. Minimal output from chest tube which was removed. Remains on 3 liters NC; will wean as tolerated in efforts to discharge to [facility].    ROS: No chest pain, palpitations, SOB, light headedness, dizziness, headache, nausea/vomiting, fevers/chills, abdominal pain, dysuria or increased urinary frequency.    ICU Vital Signs Last 24 Hrs  T(C): 37.1 (16 Oct 2022 09:05), Max: 37.1 (15 Oct 2022 20:19)  T(F): 98.7 (16 Oct 2022 09:05), Max: 98.8 (15 Oct 2022 20:19)  HR: 98 (16 Oct 2022 09:05) (98 - 104)  BP: 111/75 (16 Oct 2022 09:05) (105/71 - 111/76)  RR: 18 (16 Oct 2022 09:05) (18 - 18)  SpO2: 94% (16 Oct 2022 09:05) (94% - 97%)    O2 Parameters below as of 16 Oct 2022 09:05  Patient On (Oxygen Delivery Method): nasal cannula      MEDICATIONS  (STANDING):  atropine 1% Solution 1 Drop(s) Both EYES two times a day  clonazePAM  Tablet 1 milliGRAM(s) Oral three times a day  cloZAPine 250 milliGRAM(s) Oral at bedtime  lithium 450 milliGRAM(s) Oral two times a day  nicotine -  14 mG/24Hr(s) Patch 1 Patch Transdermal daily  piperacillin/tazobactam IVPB.. 3.375 Gram(s) IV Intermittent every 8 hours  senna 2 Tablet(s) Oral at bedtime    MEDICATIONS  (PRN):  acetaminophen     Tablet .. 650 milliGRAM(s) Oral every 6 hours PRN Temp greater or equal to 38C (100.4F), Mild Pain (1 - 3)  aluminum hydroxide/magnesium hydroxide/simethicone Suspension 30 milliLiter(s) Oral every 4 hours PRN Dyspepsia  benzocaine 15 mG/menthol 3.6 mG Lozenge 1 Lozenge Oral every 4 hours PRN Sore Throat  melatonin 3 milliGRAM(s) Oral at bedtime PRN Insomnia  ondansetron Injectable 4 milliGRAM(s) IV Push every 8 hours PRN Nausea and/or Vomiting      LABS:                        11.5   x     )-----------( x        ( 16 Oct 2022 08:40 )             36.1     10-16    133<L>  |  96<L>  |  7.0<L>  ----------------------------<  115<H>  3.6   |  28.0  |  0.59    Ca    9.0      16 Oct 2022 04:40  Phos  2.6     10-16  Mg     2.1     10-16        Urinalysis Basic - ( 16 Oct 2022 13:25 )    Color: Yellow / Appearance: Clear / S.005 / pH: x  Gluc: x / Ketone: Negative  / Bili: Negative / Urobili: Negative mg/dL   Blood: x / Protein: Negative / Nitrite: Negative   Leuk Esterase: Negative / RBC: 0-2 /HPF / WBC 0-2 /HPF   Sq Epi: x / Non Sq Epi: Occasional / Bacteria: Few      PHYSICAL EXAM:    GENERAL: middle aged male, sitting in chair, NAD  HEAD:  Atraumatic, Normocephalic  EYES: EOMI, PERRLA, conjunctiva and sclera clear  ENMT: Moist mucous membranes  NECK: Supple   NERVOUS SYSTEM:  Alert & Oriented X2  CHEST/LUNG: diminished breath sounds;  + chest tube  HEART: Regular rate and rhythm; + S1/S2  ABDOMEN: Soft, Nontender, Nondistended; Bowel sounds present  EXTREMITIES:  no pedal edema

## 2022-10-16 NOTE — CHART NOTE - NSCHARTNOTEFT_GEN_A_CORE
Contacted by RN for hematuria.  Patient seen and examined at bedside. Patient was lightly sleeping, easily awoken. Patient states he had a lot of blood in urine the morning he had had burning with urination a few days ago but nothing recently, denies dysuria, hesitancy, N/V/D/C, HA, lightheadedness, weakness or any other complaints.    Vital Signs  T(C): 36.6 (10-16-22 @ 05:19), Max: 37.1 (10-15-22 @ 20:19)  HR: 102 (10-16-22 @ 05:19) (99 - 104)  BP: 106/70 (10-16-22 @ 05:19) (105/68 - 111/76)  RR: 18 (10-16-22 @ 05:19) (18 - 20)  SpO2: 94% (10-16-22 @ 05:19) (92% - 98%)    Physical Exam:  Gen: laying in bed comfortably in NAD  urogenital: penis atraumatic, evidence of hematuria seen on patient's floor and leg, urine already disposed of.   Abd: +bowel sounds, soft/nt/nd  Ext: MAEx4, without edema, IV no longer in patient's arm--found dangling from side of bed while running    A/P: hematuria  H+H stat   UA STAT  patient currently on Zosyn empirically for loculated effusion  RN to replace IV  RN to monitor, assess and escalate to PA PRN.  Will continue to monitor.

## 2022-10-16 NOTE — CHART NOTE - NSCHARTNOTEFT_GEN_A_CORE
PTC with minimal drainage after being striped  Removed at bedside without complication  CXR pending result  Continue aggressive cough and deep breathing/IS use/Chest PT  Plan discussed with Dr. Shen in AM rounds PTC with minimal drainage after being striped  Removed at bedside without complication  CXR appears unchanged, awaiting official read   Continue aggressive cough and deep breathing/IS use/Chest PT  Plan discussed with Dr. Shen in AM rounds

## 2022-10-17 LAB
ANION GAP SERPL CALC-SCNC: 8 MMOL/L — SIGNIFICANT CHANGE UP (ref 5–17)
APTT BLD: 30.5 SEC — SIGNIFICANT CHANGE UP (ref 27.5–35.5)
BASOPHILS # BLD AUTO: 0.05 K/UL — SIGNIFICANT CHANGE UP (ref 0–0.2)
BASOPHILS NFR BLD AUTO: 0.3 % — SIGNIFICANT CHANGE UP (ref 0–2)
BUN SERPL-MCNC: 5.1 MG/DL — LOW (ref 8–20)
CALCIUM SERPL-MCNC: 9.1 MG/DL — SIGNIFICANT CHANGE UP (ref 8.4–10.5)
CHLORIDE SERPL-SCNC: 96 MMOL/L — LOW (ref 98–107)
CO2 SERPL-SCNC: 30 MMOL/L — HIGH (ref 22–29)
CREAT SERPL-MCNC: 0.53 MG/DL — SIGNIFICANT CHANGE UP (ref 0.5–1.3)
CULTURE RESULTS: SIGNIFICANT CHANGE UP
EGFR: 126 ML/MIN/1.73M2 — SIGNIFICANT CHANGE UP
EOSINOPHIL # BLD AUTO: 0.38 K/UL — SIGNIFICANT CHANGE UP (ref 0–0.5)
EOSINOPHIL NFR BLD AUTO: 2.6 % — SIGNIFICANT CHANGE UP (ref 0–6)
GLUCOSE SERPL-MCNC: 110 MG/DL — HIGH (ref 70–99)
HCT VFR BLD CALC: 35.2 % — LOW (ref 39–50)
HGB BLD-MCNC: 11.1 G/DL — LOW (ref 13–17)
IMM GRANULOCYTES NFR BLD AUTO: 0.5 % — SIGNIFICANT CHANGE UP (ref 0–0.9)
INR BLD: 1.49 RATIO — HIGH (ref 0.88–1.16)
LYMPHOCYTES # BLD AUTO: 1.77 K/UL — SIGNIFICANT CHANGE UP (ref 1–3.3)
LYMPHOCYTES # BLD AUTO: 12.2 % — LOW (ref 13–44)
MAGNESIUM SERPL-MCNC: 2.1 MG/DL — SIGNIFICANT CHANGE UP (ref 1.6–2.6)
MCHC RBC-ENTMCNC: 29.1 PG — SIGNIFICANT CHANGE UP (ref 27–34)
MCHC RBC-ENTMCNC: 31.5 GM/DL — LOW (ref 32–36)
MCV RBC AUTO: 92.4 FL — SIGNIFICANT CHANGE UP (ref 80–100)
MONOCYTES # BLD AUTO: 1.26 K/UL — HIGH (ref 0–0.9)
MONOCYTES NFR BLD AUTO: 8.7 % — SIGNIFICANT CHANGE UP (ref 2–14)
NEUTROPHILS # BLD AUTO: 10.95 K/UL — HIGH (ref 1.8–7.4)
NEUTROPHILS NFR BLD AUTO: 75.7 % — SIGNIFICANT CHANGE UP (ref 43–77)
PHOSPHATE SERPL-MCNC: 2.8 MG/DL — SIGNIFICANT CHANGE UP (ref 2.4–4.7)
PLATELET # BLD AUTO: 473 K/UL — HIGH (ref 150–400)
POTASSIUM SERPL-MCNC: 3.7 MMOL/L — SIGNIFICANT CHANGE UP (ref 3.5–5.3)
POTASSIUM SERPL-SCNC: 3.7 MMOL/L — SIGNIFICANT CHANGE UP (ref 3.5–5.3)
PROCALCITONIN SERPL-MCNC: 0.14 NG/ML — HIGH (ref 0.02–0.1)
PROTHROM AB SERPL-ACNC: 17.3 SEC — HIGH (ref 10.5–13.4)
RBC # BLD: 3.81 M/UL — LOW (ref 4.2–5.8)
RBC # FLD: 13.4 % — SIGNIFICANT CHANGE UP (ref 10.3–14.5)
SARS-COV-2 RNA SPEC QL NAA+PROBE: SIGNIFICANT CHANGE UP
SODIUM SERPL-SCNC: 134 MMOL/L — LOW (ref 135–145)
SPECIMEN SOURCE: SIGNIFICANT CHANGE UP
TSH SERPL-MCNC: 1.44 UIU/ML — SIGNIFICANT CHANGE UP (ref 0.27–4.2)
VIT B12 SERPL-MCNC: 403 PG/ML — SIGNIFICANT CHANGE UP (ref 232–1245)
WBC # BLD: 14.48 K/UL — HIGH (ref 3.8–10.5)
WBC # FLD AUTO: 14.48 K/UL — HIGH (ref 3.8–10.5)

## 2022-10-17 PROCEDURE — 71045 X-RAY EXAM CHEST 1 VIEW: CPT | Mod: 26

## 2022-10-17 PROCEDURE — 99231 SBSQ HOSP IP/OBS SF/LOW 25: CPT | Mod: FS

## 2022-10-17 PROCEDURE — 99233 SBSQ HOSP IP/OBS HIGH 50: CPT

## 2022-10-17 PROCEDURE — 71250 CT THORAX DX C-: CPT | Mod: 26

## 2022-10-17 RX ORDER — LIDOCAINE HCL 20 MG/ML
20 VIAL (ML) INJECTION ONCE
Refills: 0 | Status: DISCONTINUED | OUTPATIENT
Start: 2022-10-17 | End: 2022-10-23

## 2022-10-17 RX ADMIN — PIPERACILLIN AND TAZOBACTAM 25 GRAM(S): 4; .5 INJECTION, POWDER, LYOPHILIZED, FOR SOLUTION INTRAVENOUS at 21:15

## 2022-10-17 RX ADMIN — Medication 1 PATCH: at 13:45

## 2022-10-17 RX ADMIN — SENNA PLUS 2 TABLET(S): 8.6 TABLET ORAL at 21:14

## 2022-10-17 RX ADMIN — Medication 1 DROP(S): at 05:09

## 2022-10-17 RX ADMIN — LITHIUM CARBONATE 450 MILLIGRAM(S): 300 TABLET, EXTENDED RELEASE ORAL at 17:44

## 2022-10-17 RX ADMIN — PIPERACILLIN AND TAZOBACTAM 25 GRAM(S): 4; .5 INJECTION, POWDER, LYOPHILIZED, FOR SOLUTION INTRAVENOUS at 05:09

## 2022-10-17 RX ADMIN — Medication 1 MILLIGRAM(S): at 13:45

## 2022-10-17 RX ADMIN — CLOZAPINE 250 MILLIGRAM(S): 150 TABLET, ORALLY DISINTEGRATING ORAL at 21:14

## 2022-10-17 RX ADMIN — Medication 1 PATCH: at 21:22

## 2022-10-17 RX ADMIN — Medication 1 MILLIGRAM(S): at 21:14

## 2022-10-17 RX ADMIN — Medication 1 DROP(S): at 17:44

## 2022-10-17 RX ADMIN — LITHIUM CARBONATE 450 MILLIGRAM(S): 300 TABLET, EXTENDED RELEASE ORAL at 05:08

## 2022-10-17 RX ADMIN — PIPERACILLIN AND TAZOBACTAM 25 GRAM(S): 4; .5 INJECTION, POWDER, LYOPHILIZED, FOR SOLUTION INTRAVENOUS at 13:45

## 2022-10-17 RX ADMIN — Medication 1 MILLIGRAM(S): at 05:12

## 2022-10-17 NOTE — PROGRESS NOTE ADULT - SUBJECTIVE AND OBJECTIVE BOX
CHIEF COMPLAINT/INTERVAL HISTORY:    Patient is a 47y old  Male who presents with a chief complaint of Large Left loculated pleural effusion (16 Oct 2022 15:44)    SUBJECTIVE & OBJECTIVE: Pt seen and examined at bedside. No overnight events. Desaturating today and on 5 liters NC from 3 liters although patient reports feeling better. Repeat CXR relatively unchanged. CTS follow up requested; bedside POCUS to be performed.    ROS: No chest pain, palpitations, SOB, light headedness, dizziness, headache, nausea/vomiting, fevers/chills, abdominal pain, dysuria.    ICU Vital Signs Last 24 Hrs  T(C): 36.7 (17 Oct 2022 08:50), Max: 37.1 (16 Oct 2022 20:40)  T(F): 98 (17 Oct 2022 08:50), Max: 98.8 (17 Oct 2022 04:54)  HR: 99 (17 Oct 2022 08:50) (94 - 104)  BP: 112/77 (17 Oct 2022 08:50) (100/65 - 116/69)  BP(mean): 88 (17 Oct 2022 08:50) (88 - 88)  RR: 24 (17 Oct 2022 08:50) (18 - 24)  SpO2: 93% (17 Oct 2022 08:50) (91% - 97%)    O2 Parameters below as of 17 Oct 2022 08:50  Patient On (Oxygen Delivery Method): nasal cannula  O2 Flow (L/min): 5    MEDICATIONS  (STANDING):  atropine 1% Solution 1 Drop(s) Both EYES two times a day  clonazePAM  Tablet 1 milliGRAM(s) Oral three times a day  cloZAPine 250 milliGRAM(s) Oral at bedtime  lithium 450 milliGRAM(s) Oral two times a day  nicotine -  14 mG/24Hr(s) Patch 1 Patch Transdermal daily  piperacillin/tazobactam IVPB.. 3.375 Gram(s) IV Intermittent every 8 hours  senna 2 Tablet(s) Oral at bedtime    MEDICATIONS  (PRN):  acetaminophen     Tablet .. 650 milliGRAM(s) Oral every 6 hours PRN Temp greater or equal to 38C (100.4F), Mild Pain (1 - 3)  aluminum hydroxide/magnesium hydroxide/simethicone Suspension 30 milliLiter(s) Oral every 4 hours PRN Dyspepsia  benzocaine 15 mG/menthol 3.6 mG Lozenge 1 Lozenge Oral every 4 hours PRN Sore Throat  melatonin 3 milliGRAM(s) Oral at bedtime PRN Insomnia  ondansetron Injectable 4 milliGRAM(s) IV Push every 8 hours PRN Nausea and/or Vomiting      LABS:                        11.1   14.48 )-----------( 473      ( 17 Oct 2022 04:45 )             35.2     10-    134<L>  |  96<L>  |  5.1<L>  ----------------------------<  110<H>  3.7   |  30.0<H>  |  0.53    Ca    9.1      17 Oct 2022 04:45  Phos  2.8     10-17  Mg     2.1     10-17        Urinalysis Basic - ( 16 Oct 2022 13:25 )    Color: Yellow / Appearance: Clear / S.005 / pH: x  Gluc: x / Ketone: Negative  / Bili: Negative / Urobili: Negative mg/dL   Blood: x / Protein: Negative / Nitrite: Negative   Leuk Esterase: Negative / RBC: 0-2 /HPF / WBC 0-2 /HPF   Sq Epi: x / Non Sq Epi: Occasional / Bacteria: Few    PHYSICAL EXAM:    GENERAL: middle aged male, laying in bed, NAD  HEAD:  Atraumatic, Normocephalic  EYES: EOMI, PERRLA, conjunctiva and sclera clear  ENMT: Moist mucous membranes  NECK: Supple   NERVOUS SYSTEM:  Alert & Oriented X2  CHEST/LUNG: diminished breath sounds;  + chest tube  HEART: Regular rate and rhythm; + S1/S2  ABDOMEN: Soft, Nontender, Nondistended; Bowel sounds present  EXTREMITIES:  no pedal edema

## 2022-10-17 NOTE — PROGRESS NOTE ADULT - SUBJECTIVE AND OBJECTIVE BOX
Significant recent/past 24 hr events:  No acute overnight events. Pt requiring increasing NC overnight, currently stable on 3L NC  CT chest demonstrating complex fluid collection on left.   Pt currently in NAD and denies N/V/D HA, dizziness, blurry vision, numbness/tingling, SOB, cough, chest pain, palpitations, abd pain or urinary sx's.     Subjective:  Review of Systems     ROS negative x 10 systems except as noted above    Patient is a 47y old  Male who presents with a chief complaint of Large Left loculated pleural effusion (17 Oct 2022 13:20)    HPI:  48 yo male with hx of Schizoaffective disorder, bipolar and tobacco abuse presents with complaints of left sided pain and shortness of breath. Patient reports 4 day  history of body aches with poor appetite. He has only been drinking liquids at home. Patient was found to be tachycardiac up to 140s at [place] and was therefore sent in for an evaluation. Patient endorses subjective fevers associated with a dry cough while at [place]. While in the ED, cxr and CT chest was obtained which showed a large loculated left pleural effusion. CT surgery was consulted and admission to medicine was requested.  (12 Oct 2022 08:47)    PAST MEDICAL & SURGICAL HISTORY:  Schizoaffective disorder      Bipolar disorder      History of tobacco abuse      S/P tonsillectomy        FAMILY HISTORY:  FH: Alzheimers disease        Vitals   ICU Vital Signs Last 24 Hrs  T(C): 36.7 (17 Oct 2022 16:48), Max: 37.1 (16 Oct 2022 20:40)  T(F): 98.1 (17 Oct 2022 16:48), Max: 98.8 (17 Oct 2022 04:54)  HR: 101 (17 Oct 2022 16:48) (94 - 101)  BP: 107/85 (17 Oct 2022 16:48) (100/65 - 112/77)  BP(mean): 88 (17 Oct 2022 08:50) (88 - 88)  ABP: --  ABP(mean): --  RR: 18 (17 Oct 2022 16:48) (18 - 24)  SpO2: 93% (17 Oct 2022 16:48) (91% - 97%)    O2 Parameters below as of 17 Oct 2022 16:48  Patient On (Oxygen Delivery Method): nasal cannula  O2 Flow (L/min): 3      I&O's Detail    16 Oct 2022 07:01  -  17 Oct 2022 07:00  --------------------------------------------------------  IN:  Total IN: 0 mL    OUT:    Voided (mL): 900 mL  Total OUT: 900 mL    Total NET: -900 mL      17 Oct 2022 07:01  -  17 Oct 2022 18:05  --------------------------------------------------------  IN:  Total IN: 0 mL    OUT:    Voided (mL): 200 mL  Total OUT: 200 mL    Total NET: -200 mL        LABS                        11.1   14.48 )-----------( 473      ( 17 Oct 2022 04:45 )             35.2     10-17    134<L>  |  96<L>  |  5.1<L>  ----------------------------<  110<H>  3.7   |  30.0<H>  |  0.53    Ca    9.1      17 Oct 2022 04:45  Phos  2.8     10-17  Mg     2.1     10-17              Urinalysis Basic - ( 16 Oct 2022 13:25 )    Color: Yellow / Appearance: Clear / S.005 / pH: x  Gluc: x / Ketone: Negative  / Bili: Negative / Urobili: Negative mg/dL   Blood: x / Protein: Negative / Nitrite: Negative   Leuk Esterase: Negative / RBC: 0-2 /HPF / WBC 0-2 /HPF   Sq Epi: x / Non Sq Epi: Occasional / Bacteria: Few        MEDICATIONS  (STANDING):  atropine 1% Solution 1 Drop(s) Both EYES two times a day  clonazePAM  Tablet 1 milliGRAM(s) Oral three times a day  cloZAPine 250 milliGRAM(s) Oral at bedtime  lidocaine 1% Injectable 20 milliLiter(s) Local Injection once  lithium 450 milliGRAM(s) Oral two times a day  nicotine -  14 mG/24Hr(s) Patch 1 Patch Transdermal daily  piperacillin/tazobactam IVPB.. 3.375 Gram(s) IV Intermittent every 8 hours  senna 2 Tablet(s) Oral at bedtime    MEDICATIONS  (PRN):  acetaminophen     Tablet .. 650 milliGRAM(s) Oral every 6 hours PRN Temp greater or equal to 38C (100.4F), Mild Pain (1 - 3)  aluminum hydroxide/magnesium hydroxide/simethicone Suspension 30 milliLiter(s) Oral every 4 hours PRN Dyspepsia  benzocaine 15 mG/menthol 3.6 mG Lozenge 1 Lozenge Oral every 4 hours PRN Sore Throat  melatonin 3 milliGRAM(s) Oral at bedtime PRN Insomnia  ondansetron Injectable 4 milliGRAM(s) IV Push every 8 hours PRN Nausea and/or Vomiting      Allergies:  No Known Allergies      Physical Exam:   Constitutional: NAD  Neuro: A+O x 3, non-focal, speech clear and intact  CV: +S1S2  Pulm/chest: Left diminished breath sounds, no accessory muscle use noted  Abd: +BS, soft, NT, ND  Ext: HUNTER x 4, no edema  Skin: warm, well perfused  Psych: calm  Drains: Left PTC site with sterile dressing in place. No SQ air noted      Code Status: Full Code

## 2022-10-17 NOTE — PROGRESS NOTE ADULT - ASSESSMENT
47 year old male patient, current [place] resident, admitted to medicine with c/o left sided abdominal pain/chest discomfort X 4 days. +Dry cough. Patient found with Leukocytosis (WBC >20), low grade fever, and CT chest showing Large Left Loculated Pleural Effusion. Patient in no acute respiratory distress, with SpO2 stable on room air.  10/12 left percutaneous chest tube placed  10/16 CT removed  10/17 Reconsulted for possible pleural effusion reaccumulation

## 2022-10-17 NOTE — PROGRESS NOTE ADULT - PROBLEM SELECTOR PLAN 1
10/17 Re-consulted for possible reaccumulation of pleural effusion.  -Overnight requiring increasing NC req (up to 6L), currently stable on 3L NC  -10/17 CT chest: Moderate/large multiloculated left pleural effusion is present. The component located along the mediastinal pleura has markedly decreased in size and contains small amount of gas measuring 3.7 x 3.1 cm (image 221, series 4) and previously was 7.8 x 7.8 cm. Loculated components along the cranial aspect of the left major fissure and posterolaterally are identified and are similar in size.  -Plan for possible PTC vs formal CT in AM. Pre procedural labs ordered   Continuous SpO2 monitoring.  Maintain SpO2 >92%. Supplement with O2 therapy PRN.   Prior Pleural fluid  -Exudative effusion per light's criteria, however culture and cytology negative  Encourage cough and deep breathing/IS use   Rest of care per primary team  Plan discussed with Thoracic Surgery Attending/Team in AM rounds

## 2022-10-17 NOTE — PROGRESS NOTE ADULT - ASSESSMENT
Patient is a 47 year old male with PMH of Schizoaffective disorder, bipolar disorder and tobacco abuse presents with complaints of left sided chest pain pain and shortness of breath and found to have a large left sided loculated effusion.    #Hypoxia due to Large loculated left sided pleural effusion; unclear etiology  - worsening hypoxia s/p left chest tube removal on 10/16. Repeat CXR unchanged. CTS recalled to perform POCUS.   - cytology negative for malignant cells; but remarkable for acute inflammation  - pleural fluid cultures negative  - continue empiric zosyn x 7 days  - leukocytosis continues to improve  - procal minimally elevated  - wean O2 as tolerated (can not return to [place] on 5 liters)  - f/u further CT  surgery recs    #Schizoaffective disorder/Bipolar disorder  - continue clonazepam, lithium, aripiprazole (IM once monthly 10/5/2022), clozapine  - psych recs appreciated    #Tobacco abuse  - Nicotine patch    #Leukocytosis possibly due to PNA with parapneumonic effusion  -pleural fluid cultures are negative  -urine culture negative  -blood cultures negative  -leukocytosis improving daily therefore continue empiric zosyn  -monitor fever curve  -CT chest with large effusion and compressive atelectasis    #Pulmonary nodule  -continue outpatient surveillance    #DVT Prophylaxis - SCDs    Dispo - Worsening hypoxia; CTS recalled and will perform bedside US. Patient can not return to [place] unless on 3 liters of NC or less.    Plan discussed with patient, RN, SW, CT surgery PA   Patient is a 47 year old male with PMH of Schizoaffective disorder, bipolar disorder and tobacco abuse presents with complaints of left sided chest pain pain and shortness of breath and found to have a large left sided loculated effusion.    #Hypoxia due to Large loculated left sided pleural effusion; unclear etiology  - worsening hypoxia s/p left chest tube removal on 10/16. Repeat CXR unchanged. CTS recalled to perform POCUS.   - cytology negative for malignant cells; but remarkable for acute inflammation  - pleural fluid cultures negative  - continue empiric zosyn x 7 days  - leukocytosis continues to improve  - procal minimally elevated  - wean O2 as tolerated (can not return to [place] on 5 liters)  - check stat CT chest  - f/u further CT  surgery recs    #Schizoaffective disorder/Bipolar disorder  - continue clonazepam, lithium, aripiprazole (IM once monthly 10/5/2022), clozapine  - psych recs appreciated    #Tobacco abuse  - Nicotine patch    #Leukocytosis possibly due to PNA with parapneumonic effusion  -pleural fluid cultures are negative  -urine culture negative  -blood cultures negative  -leukocytosis improving daily therefore continue empiric zosyn  -monitor fever curve  -CT chest with large effusion and compressive atelectasis    #Pulmonary nodule  -continue outpatient surveillance    #DVT Prophylaxis - SCDs    Dispo - Worsening hypoxia; CTS recalled and will perform bedside US. Patient can not return to [place] unless on 3 liters of NC or less.    Plan discussed with patient, RN, SW, CT surgery PA

## 2022-10-17 NOTE — ED BEHAVIORAL HEALTH NOTE - BEHAVIORAL HEALTH NOTE
chart reviewed Patient seen earlier at 0755  calm cooperative Offers no complaints breathing much improved  no CT  on NC O2  Vital Signs Last 24 Hrs  T(C): 36.7 (17 Oct 2022 08:50), Max: 37.1 (16 Oct 2022 20:40)  T(F): 98 (17 Oct 2022 08:50), Max: 98.8 (17 Oct 2022 04:54)  HR: 99 (17 Oct 2022 08:50) (94 - 104)  BP: 112/77 (17 Oct 2022 08:50) (100/65 - 116/69)  BP(mean): 88 (17 Oct 2022 08:50) (88 - 88)  RR: 24 (17 Oct 2022 08:50) (18 - 24)  SpO2: 93% (17 Oct 2022 08:50) (91% - 97%)    Parameters below as of 17 Oct 2022 08:50  Patient On (Oxygen Delivery Method): nasal cannula  O2 Flow (L/min): 5                        11.1   14.48 )-----------( 473      ( 17 Oct 2022 04:45 )             35.2     10-17    134<L>  |  96<L>  |  5.1<L>  ----------------------------<  110<H>  3.7   |  30.0<H>  |  0.53    Ca    9.1      17 Oct 2022 04:45  Phos  2.8     10-17  Mg     2.1     10-17          Urinalysis Basic - ( 16 Oct 2022 13:25 )    Color: Yellow / Appearance: Clear / S.005 / pH: x  Gluc: x / Ketone: Negative  / Bili: Negative / Urobili: Negative mg/dL   Blood: x / Protein: Negative / Nitrite: Negative   Leuk Esterase: Negative / RBC: 0-2 /HPF / WBC 0-2 /HPF   Sq Epi: x / Non Sq Epi: Occasional / Bacteria: Few  awaiting medical clearance for return to [facility]  no new recommendations

## 2022-10-18 LAB
ALBUMIN FLD-MCNC: 2.4 G/DL — SIGNIFICANT CHANGE UP
AMMONIA BLD-MCNC: 21 UMOL/L — SIGNIFICANT CHANGE UP (ref 11–55)
ANION GAP SERPL CALC-SCNC: 10 MMOL/L — SIGNIFICANT CHANGE UP (ref 5–17)
ANION GAP SERPL CALC-SCNC: 5 MMOL/L — SIGNIFICANT CHANGE UP (ref 5–17)
APTT BLD: 30.7 SEC — SIGNIFICANT CHANGE UP (ref 27.5–35.5)
B PERT IGG+IGM PNL SER: ABNORMAL
BASE EXCESS BLDA CALC-SCNC: 14.7 MMOL/L — HIGH (ref -2–3)
BASE EXCESS BLDV CALC-SCNC: 11.4 MMOL/L — HIGH (ref -2–3)
BASOPHILS # BLD AUTO: 0.06 K/UL — SIGNIFICANT CHANGE UP (ref 0–0.2)
BASOPHILS NFR BLD AUTO: 0.4 % — SIGNIFICANT CHANGE UP (ref 0–2)
BUN SERPL-MCNC: 3.5 MG/DL — LOW (ref 8–20)
BUN SERPL-MCNC: 4 MG/DL — LOW (ref 8–20)
CA-I SERPL-SCNC: 1.23 MMOL/L — SIGNIFICANT CHANGE UP (ref 1.15–1.33)
CALCIUM SERPL-MCNC: 8.9 MG/DL — SIGNIFICANT CHANGE UP (ref 8.4–10.5)
CALCIUM SERPL-MCNC: 9.3 MG/DL — SIGNIFICANT CHANGE UP (ref 8.4–10.5)
CHLORIDE BLDV-SCNC: 98 MMOL/L — SIGNIFICANT CHANGE UP (ref 98–107)
CHLORIDE SERPL-SCNC: 96 MMOL/L — LOW (ref 98–107)
CHLORIDE SERPL-SCNC: 98 MMOL/L — SIGNIFICANT CHANGE UP (ref 98–107)
CO2 SERPL-SCNC: 31 MMOL/L — HIGH (ref 22–29)
CO2 SERPL-SCNC: 34 MMOL/L — HIGH (ref 22–29)
COLOR FLD: ABNORMAL
CREAT SERPL-MCNC: 0.54 MG/DL — SIGNIFICANT CHANGE UP (ref 0.5–1.3)
CREAT SERPL-MCNC: 0.64 MG/DL — SIGNIFICANT CHANGE UP (ref 0.5–1.3)
EGFR: 119 ML/MIN/1.73M2 — SIGNIFICANT CHANGE UP
EGFR: 125 ML/MIN/1.73M2 — SIGNIFICANT CHANGE UP
EOSINOPHIL # BLD AUTO: 0.5 K/UL — SIGNIFICANT CHANGE UP (ref 0–0.5)
EOSINOPHIL NFR BLD AUTO: 3.4 % — SIGNIFICANT CHANGE UP (ref 0–6)
FLUID INTAKE SUBSTANCE CLASS: SIGNIFICANT CHANGE UP
GAS PNL BLDA: SIGNIFICANT CHANGE UP
GAS PNL BLDV: 135 MMOL/L — LOW (ref 136–145)
GAS PNL BLDV: SIGNIFICANT CHANGE UP
GLUCOSE BLDV-MCNC: 109 MG/DL — HIGH (ref 70–99)
GLUCOSE FLD-MCNC: 44 MG/DL — SIGNIFICANT CHANGE UP
GLUCOSE SERPL-MCNC: 106 MG/DL — HIGH (ref 70–99)
GLUCOSE SERPL-MCNC: 117 MG/DL — HIGH (ref 70–99)
GRAM STN FLD: SIGNIFICANT CHANGE UP
HCO3 BLDA-SCNC: 39 MMOL/L — HIGH (ref 21–28)
HCO3 BLDV-SCNC: 38 MMOL/L — HIGH (ref 22–29)
HCT VFR BLD CALC: 35.4 % — LOW (ref 39–50)
HCT VFR BLD CALC: 36.8 % — LOW (ref 39–50)
HCT VFR BLDA CALC: 34 % — SIGNIFICANT CHANGE UP
HGB BLD CALC-MCNC: 11.2 G/DL — LOW (ref 12.6–17.4)
HGB BLD-MCNC: 11.1 G/DL — LOW (ref 13–17)
HGB BLD-MCNC: 11.4 G/DL — LOW (ref 13–17)
HOROWITZ INDEX BLDA+IHG-RTO: SIGNIFICANT CHANGE UP
IMM GRANULOCYTES NFR BLD AUTO: 0.7 % — SIGNIFICANT CHANGE UP (ref 0–0.9)
INR BLD: 1.49 RATIO — HIGH (ref 0.88–1.16)
LACTATE BLDV-MCNC: 0.7 MMOL/L — SIGNIFICANT CHANGE UP (ref 0.5–2)
LACTATE SERPL-SCNC: 0.7 MMOL/L — SIGNIFICANT CHANGE UP (ref 0.5–2)
LDH SERPL L TO P-CCNC: 1969 U/L — SIGNIFICANT CHANGE UP
LITHIUM SERPL-MCNC: 0.85 MMOL/L — SIGNIFICANT CHANGE UP (ref 0.5–1.5)
LYMPHOCYTES # BLD AUTO: 13.8 % — SIGNIFICANT CHANGE UP (ref 13–44)
LYMPHOCYTES # BLD AUTO: 2 K/UL — SIGNIFICANT CHANGE UP (ref 1–3.3)
LYMPHOCYTES # FLD: 4 % — SIGNIFICANT CHANGE UP
MAGNESIUM SERPL-MCNC: 2 MG/DL — SIGNIFICANT CHANGE UP (ref 1.8–2.6)
MCHC RBC-ENTMCNC: 29.5 PG — SIGNIFICANT CHANGE UP (ref 27–34)
MCHC RBC-ENTMCNC: 30 PG — SIGNIFICANT CHANGE UP (ref 27–34)
MCHC RBC-ENTMCNC: 31 GM/DL — LOW (ref 32–36)
MCHC RBC-ENTMCNC: 31.4 GM/DL — LOW (ref 32–36)
MCV RBC AUTO: 94.1 FL — SIGNIFICANT CHANGE UP (ref 80–100)
MCV RBC AUTO: 96.8 FL — SIGNIFICANT CHANGE UP (ref 80–100)
MESOTHL CELL # FLD: 1 % — SIGNIFICANT CHANGE UP
MONOCYTES # BLD AUTO: 1.29 K/UL — HIGH (ref 0–0.9)
MONOCYTES NFR BLD AUTO: 8.9 % — SIGNIFICANT CHANGE UP (ref 2–14)
MONOS+MACROS # FLD: 5 % — SIGNIFICANT CHANGE UP
NEUTROPHILS # BLD AUTO: 10.56 K/UL — HIGH (ref 1.8–7.4)
NEUTROPHILS NFR BLD AUTO: 72.8 % — SIGNIFICANT CHANGE UP (ref 43–77)
NEUTROPHILS-BODY FLUID: 90 % — SIGNIFICANT CHANGE UP
PCO2 BLDA: 62 MMHG — HIGH (ref 35–48)
PCO2 BLDV: 79 MMHG — HIGH (ref 42–55)
PH BLDA: 7.41 — SIGNIFICANT CHANGE UP (ref 7.35–7.45)
PH BLDV: 7.29 — LOW (ref 7.32–7.43)
PH FLD: 8.5 — SIGNIFICANT CHANGE UP
PHOSPHATE SERPL-MCNC: 3 MG/DL — SIGNIFICANT CHANGE UP (ref 2.4–4.7)
PLATELET # BLD AUTO: 506 K/UL — HIGH (ref 150–400)
PLATELET # BLD AUTO: 517 K/UL — HIGH (ref 150–400)
PO2 BLDA: 60 MMHG — LOW (ref 83–108)
PO2 BLDV: <42 MMHG — SIGNIFICANT CHANGE UP (ref 25–45)
POTASSIUM BLDV-SCNC: 4.4 MMOL/L — SIGNIFICANT CHANGE UP (ref 3.5–5.1)
POTASSIUM SERPL-MCNC: 4 MMOL/L — SIGNIFICANT CHANGE UP (ref 3.5–5.3)
POTASSIUM SERPL-MCNC: 4.6 MMOL/L — SIGNIFICANT CHANGE UP (ref 3.5–5.3)
POTASSIUM SERPL-SCNC: 4 MMOL/L — SIGNIFICANT CHANGE UP (ref 3.5–5.3)
POTASSIUM SERPL-SCNC: 4.6 MMOL/L — SIGNIFICANT CHANGE UP (ref 3.5–5.3)
PROCALCITONIN SERPL-MCNC: 0.08 NG/ML — SIGNIFICANT CHANGE UP (ref 0.02–0.1)
PROT FLD-MCNC: 4.6 G/DL — SIGNIFICANT CHANGE UP
PROTHROM AB SERPL-ACNC: 17.3 SEC — HIGH (ref 10.5–13.4)
RBC # BLD: 3.76 M/UL — LOW (ref 4.2–5.8)
RBC # BLD: 3.8 M/UL — LOW (ref 4.2–5.8)
RBC # FLD: 13.4 % — SIGNIFICANT CHANGE UP (ref 10.3–14.5)
RBC # FLD: 13.6 % — SIGNIFICANT CHANGE UP (ref 10.3–14.5)
RCV VOL RI: HIGH /UL (ref 0–0)
SAO2 % BLDA: 93.3 % — LOW (ref 94–98)
SAO2 % BLDV: 69.6 % — SIGNIFICANT CHANGE UP
SODIUM SERPL-SCNC: 137 MMOL/L — SIGNIFICANT CHANGE UP (ref 135–145)
SODIUM SERPL-SCNC: 137 MMOL/L — SIGNIFICANT CHANGE UP (ref 135–145)
SPECIMEN SOURCE: SIGNIFICANT CHANGE UP
TOTAL NUCLEATED CELL COUNT, BODY FLUID: 1608 /UL — SIGNIFICANT CHANGE UP
TSH SERPL-MCNC: 2.29 UIU/ML — SIGNIFICANT CHANGE UP (ref 0.27–4.2)
VIT B12 SERPL-MCNC: 417 PG/ML — SIGNIFICANT CHANGE UP (ref 232–1245)
WBC # BLD: 13.07 K/UL — HIGH (ref 3.8–10.5)
WBC # BLD: 14.51 K/UL — HIGH (ref 3.8–10.5)
WBC # FLD AUTO: 13.07 K/UL — HIGH (ref 3.8–10.5)
WBC # FLD AUTO: 14.51 K/UL — HIGH (ref 3.8–10.5)

## 2022-10-18 PROCEDURE — 71045 X-RAY EXAM CHEST 1 VIEW: CPT | Mod: 26,76

## 2022-10-18 PROCEDURE — 71250 CT THORAX DX C-: CPT | Mod: 26

## 2022-10-18 PROCEDURE — 32556 INSERT CATH PLEURA W/O IMAGE: CPT

## 2022-10-18 PROCEDURE — 70450 CT HEAD/BRAIN W/O DYE: CPT | Mod: 26

## 2022-10-18 PROCEDURE — 74176 CT ABD & PELVIS W/O CONTRAST: CPT | Mod: 26

## 2022-10-18 PROCEDURE — 99233 SBSQ HOSP IP/OBS HIGH 50: CPT

## 2022-10-18 PROCEDURE — ZZZZZ: CPT

## 2022-10-18 PROCEDURE — 71045 X-RAY EXAM CHEST 1 VIEW: CPT | Mod: 26

## 2022-10-18 RX ORDER — MORPHINE SULFATE 50 MG/1
1 CAPSULE, EXTENDED RELEASE ORAL ONCE
Refills: 0 | Status: DISCONTINUED | OUTPATIENT
Start: 2022-10-18 | End: 2022-10-18

## 2022-10-18 RX ORDER — SODIUM CHLORIDE 9 MG/ML
1000 INJECTION INTRAMUSCULAR; INTRAVENOUS; SUBCUTANEOUS
Refills: 0 | Status: COMPLETED | OUTPATIENT
Start: 2022-10-18 | End: 2022-10-18

## 2022-10-18 RX ORDER — VANCOMYCIN HCL 1 G
1250 VIAL (EA) INTRAVENOUS EVERY 8 HOURS
Refills: 0 | Status: DISCONTINUED | OUTPATIENT
Start: 2022-10-18 | End: 2022-10-19

## 2022-10-18 RX ORDER — LIDOCAINE HCL 20 MG/ML
30 VIAL (ML) INJECTION ONCE
Refills: 0 | Status: DISCONTINUED | OUTPATIENT
Start: 2022-10-18 | End: 2022-10-21

## 2022-10-18 RX ORDER — OXYCODONE AND ACETAMINOPHEN 5; 325 MG/1; MG/1
1 TABLET ORAL EVERY 6 HOURS
Refills: 0 | Status: DISCONTINUED | OUTPATIENT
Start: 2022-10-18 | End: 2022-10-21

## 2022-10-18 RX ORDER — VANCOMYCIN HCL 1 G
1250 VIAL (EA) INTRAVENOUS EVERY 12 HOURS
Refills: 0 | Status: DISCONTINUED | OUTPATIENT
Start: 2022-10-18 | End: 2022-10-18

## 2022-10-18 RX ORDER — SODIUM CHLORIDE 9 MG/ML
1000 INJECTION INTRAMUSCULAR; INTRAVENOUS; SUBCUTANEOUS ONCE
Refills: 0 | Status: COMPLETED | OUTPATIENT
Start: 2022-10-18 | End: 2022-10-18

## 2022-10-18 RX ADMIN — LITHIUM CARBONATE 450 MILLIGRAM(S): 300 TABLET, EXTENDED RELEASE ORAL at 05:10

## 2022-10-18 RX ADMIN — LITHIUM CARBONATE 450 MILLIGRAM(S): 300 TABLET, EXTENDED RELEASE ORAL at 18:10

## 2022-10-18 RX ADMIN — BENZOCAINE AND MENTHOL 1 LOZENGE: 5; 1 LIQUID ORAL at 05:21

## 2022-10-18 RX ADMIN — Medication 1 PATCH: at 14:00

## 2022-10-18 RX ADMIN — SODIUM CHLORIDE 2000 MILLILITER(S): 9 INJECTION INTRAMUSCULAR; INTRAVENOUS; SUBCUTANEOUS at 15:14

## 2022-10-18 RX ADMIN — Medication 1 DROP(S): at 18:15

## 2022-10-18 RX ADMIN — OXYCODONE AND ACETAMINOPHEN 1 TABLET(S): 5; 325 TABLET ORAL at 10:55

## 2022-10-18 RX ADMIN — PIPERACILLIN AND TAZOBACTAM 25 GRAM(S): 4; .5 INJECTION, POWDER, LYOPHILIZED, FOR SOLUTION INTRAVENOUS at 05:11

## 2022-10-18 RX ADMIN — OXYCODONE AND ACETAMINOPHEN 1 TABLET(S): 5; 325 TABLET ORAL at 11:38

## 2022-10-18 RX ADMIN — Medication 1 PATCH: at 07:30

## 2022-10-18 RX ADMIN — Medication 1 PATCH: at 21:16

## 2022-10-18 RX ADMIN — SODIUM CHLORIDE 60 MILLILITER(S): 9 INJECTION INTRAMUSCULAR; INTRAVENOUS; SUBCUTANEOUS at 18:09

## 2022-10-18 RX ADMIN — PIPERACILLIN AND TAZOBACTAM 25 GRAM(S): 4; .5 INJECTION, POWDER, LYOPHILIZED, FOR SOLUTION INTRAVENOUS at 14:00

## 2022-10-18 RX ADMIN — Medication 1 MILLIGRAM(S): at 05:10

## 2022-10-18 RX ADMIN — PIPERACILLIN AND TAZOBACTAM 25 GRAM(S): 4; .5 INJECTION, POWDER, LYOPHILIZED, FOR SOLUTION INTRAVENOUS at 21:18

## 2022-10-18 RX ADMIN — Medication 1 DROP(S): at 05:11

## 2022-10-18 RX ADMIN — Medication 166.67 MILLIGRAM(S): at 18:09

## 2022-10-18 RX ADMIN — SENNA PLUS 2 TABLET(S): 8.6 TABLET ORAL at 21:19

## 2022-10-18 NOTE — ED BEHAVIORAL HEALTH NOTE - BEHAVIORAL HEALTH NOTE
chart reviewed and patient seen earlier   lethargic offers no complaints  notes by CT surgery appreciated  Vital Signs Last 24 Hrs  T(C): 36.6 (18 Oct 2022 04:54), Max: 37.4 (17 Oct 2022 20:09)  T(F): 97.9 (18 Oct 2022 04:54), Max: 99.3 (17 Oct 2022 20:09)  HR: 96 (18 Oct 2022 04:54) (96 - 101)  BP: 106/69 (18 Oct 2022 04:54) (99/61 - 107/85)  BP(mean): --  RR: 18 (18 Oct 2022 04:54) (18 - 18)  SpO2: 96% (18 Oct 2022 04:54) (93% - 98%)    Parameters below as of 18 Oct 2022 04:54  Patient On (Oxygen Delivery Method): nasal cannula                        11.1   14.51 )-----------( 517      ( 18 Oct 2022 04:40 )             35.4     10-18    137  |  96<L>  |  3.5<L>  ----------------------------<  117<H>  4.0   |  31.0<H>  |  0.54    Ca    9.3      18 Oct 2022 04:40  Phos  3.0     10-18  Mg     2.0     10-18        PT/INR - ( 18 Oct 2022 04:40 )   PT: 17.3 sec;   INR: 1.49 ratio         PTT - ( 18 Oct 2022 04:40 )  PTT:30.7 sec  Urinalysis Basic - ( 16 Oct 2022 13:25 )    Color: Yellow / Appearance: Clear / S.005 / pH: x  Gluc: x / Ketone: Negative  / Bili: Negative / Urobili: Negative mg/dL   Blood: x / Protein: Negative / Nitrite: Negative   Leuk Esterase: Negative / RBC: 0-2 /HPF / WBC 0-2 /HPF   Sq Epi: x / Non Sq Epi: Occasional / Bacteria: Few  MEDICATIONS  (STANDING):  atropine 1% Solution 1 Drop(s) Both EYES two times a day  clonazePAM  Tablet 1 milliGRAM(s) Oral three times a day  cloZAPine 250 milliGRAM(s) Oral at bedtime  lidocaine 1% Injectable 20 milliLiter(s) Local Injection once  lidocaine 1% Injectable 30 milliLiter(s) Local Injection once  lithium 450 milliGRAM(s) Oral two times a day  nicotine -  14 mG/24Hr(s) Patch 1 Patch Transdermal daily  piperacillin/tazobactam IVPB.. 3.375 Gram(s) IV Intermittent every 8 hours  senna 2 Tablet(s) Oral at bedtime  < from: CT Chest No Cont (10.17.22 @ 14:49) >    IMPRESSION:.    Moderate/large multiloculated left pleural effusion with interval   decrease of the component located along the mediastinal pleura compared   to 10/12/2022. Gas within the pleural space is likely related to prior   procedure.    Increased parenchymal opacification of the left lower lobe may represent   atelectasis or consolidation/infection.    Mediastinal lymphadenopathy unchanged.    < end of copied text >      MEDICATIONS  (PRN):  acetaminophen     Tablet .. 650 milliGRAM(s) Oral every 6 hours PRN Temp greater or equal to 38C (100.4F), Mild Pain (1 - 3)  aluminum hydroxide/magnesium hydroxide/simethicone Suspension 30 milliLiter(s) Oral every 4 hours PRN Dyspepsia  benzocaine 15 mG/menthol 3.6 mG Lozenge 1 Lozenge Oral every 4 hours PRN Sore Throat  melatonin 3 milliGRAM(s) Oral at bedtime PRN Insomnia  ondansetron Injectable 4 milliGRAM(s) IV Push every 8 hours PRN Nausea and/or Vomiting  Not yet stable for return to [facility]  will follow

## 2022-10-18 NOTE — PROCEDURE NOTE - NSPROCDETAILS_GEN_ALL_CORE
Seldinger technique/dressing applied/secured in place/sterile dressing applied/ultrasound assessment of fluid (location)
dressing applied/secured in place/supine position

## 2022-10-18 NOTE — PROGRESS NOTE ADULT - SUBJECTIVE AND OBJECTIVE BOX
Patient seen and examined.  He was requiring more oxygen overnight to this morning.  Left 28 [place] chest tube placed at bedside.      T(C): 36.6 (10-18-22 @ 04:54)  T(F): 97.9 (10-18-22 @ 04:54)  HR: 96 (10-18-22 @ 04:54)  BP: 106/69 (10-18-22 @ 04:54)    RR: 18 (10-18-22 @ 04:54)  SpO2: 96% (10-18-22 @ 04:54)        Physical Exam:  Gen: A&O  Pulm:  Decreased left side.  Chest tube to -20 cm suction  CV:  S1S2, RRR,   Abd: +BS, soft, NT, ND  Ext:  +DP b/l, no c/c/e      I&O's Detail    17 Oct 2022 07:01  -  18 Oct 2022 07:00  --------------------------------------------------------  IN:  Total IN: 0 mL    OUT:    Voided (mL): 500 mL  Total OUT: 500 mL    Total NET: -500 mL                              11.1   14.51 )-----------( 517      ( 18 Oct 2022 04:40 )             35.4   10-18    137  |  96<L>  |  3.5<L>  ----------------------------<  117<H>  4.0   |  31.0<H>  |  0.54    Ca    9.3      18 Oct 2022 04:40  Phos  3.0     10-18  Mg     2.0     10-18    aPTT: 30.7 sec; PT: 17.3 sec; INR: 1.49 ratio  10-18-22 @ 04:40         CAPILLARY BLOOD GLUCOSE            Medications:  acetaminophen     Tablet .. 650 milliGRAM(s) Oral every 6 hours PRN  aluminum hydroxide/magnesium hydroxide/simethicone Suspension 30 milliLiter(s) Oral every 4 hours PRN  atropine 1% Solution 1 Drop(s) Both EYES two times a day  benzocaine 15 mG/menthol 3.6 mG Lozenge 1 Lozenge Oral every 4 hours PRN  clonazePAM  Tablet 1 milliGRAM(s) Oral three times a day  cloZAPine 250 milliGRAM(s) Oral at bedtime  lidocaine 1% Injectable 20 milliLiter(s) Local Injection once  lidocaine 1% Injectable 30 milliLiter(s) Local Injection once  lithium 450 milliGRAM(s) Oral two times a day  melatonin 3 milliGRAM(s) Oral at bedtime PRN  morphine  - Injectable 1 milliGRAM(s) IV Push once  nicotine -  14 mG/24Hr(s) Patch 1 Patch Transdermal daily  ondansetron Injectable 4 milliGRAM(s) IV Push every 8 hours PRN  piperacillin/tazobactam IVPB.. 3.375 Gram(s) IV Intermittent every 8 hours  senna 2 Tablet(s) Oral at bedtime

## 2022-10-18 NOTE — PROGRESS NOTE ADULT - PROBLEM SELECTOR PLAN 1
10/17 Re-consulted for reaccumulation of pleural effusion.  -Overnight requiring increasing NC req (up to 6L),   -10/17 CT chest: Moderate/large multiloculated left pleural effusion is present. The component located along the mediastinal pleura has markedly decreased in size and contains small amount of gas measuring 3.7 x 3.1 cm (image 221, series 4) and previously was 7.8 x 7.8 cm. Loculated components along the cranial aspect of the left major fissure and posterolaterally are identified and are similar in size.  -CT placed as in on suction  Continuous SpO2 monitoring.  Maintain SpO2 >92%. Supplement with O2 therapy PRN.   Prior Pleural fluid  -Exudative effusion per light's criteria, however culture and cytology negative  New pleural fluid specimen sent 10/18  Encourage cough and deep breathing/IS use   Rest of care per primary team  Plan discussed with Thoracic Surgery Attending/Team in AM rounds

## 2022-10-18 NOTE — PROGRESS NOTE ADULT - SUBJECTIVE AND OBJECTIVE BOX
CHIEF COMPLAINT/INTERVAL HISTORY:    Patient is a 47y old  Male who presents with a chief complaint of Large Left loculated pleural effusion (18 Oct 2022 09:41)    SUBJECTIVE & OBJECTIVE: Pt seen and examined at bedside. No overnight events. Chest tube placed earlier today; patient complaining of 7/10 pain for which 1 dose of percocet was ordered. Patient later became cold, and clammy for which repeat cultures were ordered and vanco was added. BP soft but responded to fluid bolus. Upgrade to SDU for closer monitoring. ID consult. Patient able to answer questions appropriately.    ROS: No chest pain, palpitations, SOB, light headedness, dizziness, headache, nausea/vomiting, fevers/chills, abdominal pain, dysuria.    ICU Vital Signs Last 24 Hrs  T(C): 36.5 (18 Oct 2022 15:01), Max: 37.4 (17 Oct 2022 20:09)  T(F): 97.7 (18 Oct 2022 15:01), Max: 99.3 (17 Oct 2022 20:09)  HR: 90 (18 Oct 2022 15:53) (85 - 101)  BP: 118/78 (18 Oct 2022 15:53) (93/59 - 118/78)  RR: 20 (18 Oct 2022 15:01) (18 - 20)  SpO2: 97% (18 Oct 2022 15:01) (93% - 99%)    O2 Parameters below as of 18 Oct 2022 15:01  Patient On (Oxygen Delivery Method): nasal cannula  O2 Flow (L/min): 4    MEDICATIONS  (STANDING):  atropine 1% Solution 1 Drop(s) Both EYES two times a day  clonazePAM  Tablet 1 milliGRAM(s) Oral three times a day  cloZAPine 250 milliGRAM(s) Oral at bedtime  lidocaine 1% Injectable 20 milliLiter(s) Local Injection once  lidocaine 1% Injectable 30 milliLiter(s) Local Injection once  lithium 450 milliGRAM(s) Oral two times a day  nicotine -  14 mG/24Hr(s) Patch 1 Patch Transdermal daily  piperacillin/tazobactam IVPB.. 3.375 Gram(s) IV Intermittent every 8 hours  senna 2 Tablet(s) Oral at bedtime  sodium chloride 0.9%. 1000 milliLiter(s) (60 mL/Hr) IV Continuous <Continuous>  vancomycin  IVPB 1250 milliGRAM(s) IV Intermittent every 8 hours    MEDICATIONS  (PRN):  acetaminophen     Tablet .. 650 milliGRAM(s) Oral every 6 hours PRN Temp greater or equal to 38C (100.4F), Mild Pain (1 - 3)  aluminum hydroxide/magnesium hydroxide/simethicone Suspension 30 milliLiter(s) Oral every 4 hours PRN Dyspepsia  benzocaine 15 mG/menthol 3.6 mG Lozenge 1 Lozenge Oral every 4 hours PRN Sore Throat  melatonin 3 milliGRAM(s) Oral at bedtime PRN Insomnia  ondansetron Injectable 4 milliGRAM(s) IV Push every 8 hours PRN Nausea and/or Vomiting  oxycodone    5 mG/acetaminophen 325 mG 1 Tablet(s) Oral every 6 hours PRN Severe Pain (7 - 10)      LABS:                        11.1   14.51 )-----------( 517      ( 18 Oct 2022 04:40 )             35.4     10-18    137  |  96<L>  |  3.5<L>  ----------------------------<  117<H>  4.0   |  31.0<H>  |  0.54    Ca    9.3      18 Oct 2022 04:40  Phos  3.0     10-18  Mg     2.0     10-18      PT/INR - ( 18 Oct 2022 04:40 )   PT: 17.3 sec;   INR: 1.49 ratio         PTT - ( 18 Oct 2022 04:40 )  PTT:30.7 sec    PHYSICAL EXAM:    GENERAL: middle aged male, laying in bed, pale, NAD  HEAD:  Atraumatic, Normocephalic  EYES: EOMI, PERRLA, conjunctiva and sclera clear  ENMT: Moist mucous membranes  NECK: Supple   NERVOUS SYSTEM:  Alert & Oriented X3 but weak  CHEST/LUNG: diminished breath sounds;  + chest tube  HEART: Regular rate and rhythm; + S1/S2  ABDOMEN: Soft, Nontender, Nondistended; Bowel sounds present  EXTREMITIES:  no pedal edema

## 2022-10-18 NOTE — CONSULT NOTE ADULT - ASSESSMENT
47 year old male with PMH of Schizoaffective disorder, bipolar disorder and tobacco abuse, who initially presented with complaints of left sided chest pain, shortness of breath, and low grade fevers. He was found to have a large left sided loculated pleural effusion and underwent left-sided pigtail placement by CT surgery. Pleural cultures negative.    Now with reaccumulation of pleural fluid, studies appear exudative concerning for empyema  Chest tube to suction  Worsening metabolic alkalosis of uncertain etiology, not on diuretics, with compensatory respiratory acidosis  May have primarily had respiratory acidosis due to restricted ventilation in the setting of pleural effusion, with compensatory metabolic alkalosis, that has now reversed etiology  Concern he may continue to hypoventilate to continue to compensate for metabolic process  Despite normalized pH, will trial BiPAP and repeat ABG after 2 hours  Repeat CXR tonight  Antibiotics broadened to Zosyn, follow up cultures  Psych meds adjusted for lethargy    MICU will reassess on BIPAP pending repeat ABG, CXR   For now to remain in Step Down Unit. If clinical condition deteriorates in interim, please reconsult asap  Patient seen with ICU Attending. Dr. Stern, case discussed with primary team Dr. Guzman        47 year old male with PMH of Schizoaffective disorder, bipolar disorder and tobacco abuse, who initially presented with complaints of left sided chest pain, shortness of breath, and low grade fevers. He was found to have a large left sided loculated pleural effusion and underwent left-sided pigtail placement by CT surgery. Pleural cultures negative.    Now with reaccumulation of pleural fluid, studies appear exudative concerning for empyema  Chest tube to suction  Worsening metabolic alkalosis of uncertain etiology, not on diuretics, with compensatory respiratory acidosis  May have primarily had respiratory acidosis due to restricted ventilation in the setting of pleural effusion, with compensatory metabolic alkalosis, that has now reversed etiology  Concern he may continue to hypoventilate to continue to compensate for metabolic process  Despite normalized pH, will trial BiPAP and repeat ABG after 2 hours  Repeat CXR tonight  Antibiotics broadened to Zosyn, follow up cultures  Psych meds adjusted for lethargy    High risk for necessitating intubation  MICU will reassess on BIPAP pending repeat ABG, CXR   For now to remain in Step Down Unit. If clinical condition deteriorates in interim, please reconsult asap  Patient seen with ICU Attending. Dr. Stern, case discussed with primary team Dr. Guzman

## 2022-10-18 NOTE — PROGRESS NOTE ADULT - ASSESSMENT
Patient is a 47 year old male with PMH of Schizoaffective disorder, bipolar disorder and tobacco abuse presents with complaints of left sided chest pain pain and shortness of breath and found to have a large left sided loculated effusion.    #Hypoxia due to Large loculated left sided pleural effusion; unclear etiology  - worsening hypoxia s/p left chest tube removal on 10/16. Repeat CXR unchanged. CTS recalled and formal chest tube placed today.  - cytology negative for malignant cells; but remarkable for acute inflammation. Repeat pleural fluid studies sent today.  - initial pleural fluid cultures negative, f/u repeat studies today  - continue empiric zosyn and add vanco given concerns for empyema   - blood cultures x 2, check procal and lactate  - check VBG  - leukocytosis continues to improve  - repeat CT chest; 500 cc drained after chest tube insertion  - ID evaluation   - Exclude underlying malignancy; check CT abd/pelvis  - CTS recs aprpeciated    #Schizoaffective disorder/Bipolar disorder  - hold clonazepam, lithium, clozapine while lethargic  - check clozapine and lithium levels  - aripiprazole (IM once monthly 10/5/2022),  - psych recs appreciated, discussed with Dr. Ritchie    #Tobacco abuse  - Nicotine patch    #Leukocytosis possibly due to PNA with parapneumonic effusion vs empyema although pleural fluid studies are not suggestive  -pleural fluid cultures are negative  -urine culture negative  -blood cultures negative  -repeat cultures, check procal, lactate  -leukocytosis improving daily on zosyn, add vanco as above  -monitor fever curve; tmax 99  -CT chest with large effusion and compressive atelectasis. Repeat CT ordered.    #Pulmonary nodule  -continue outpatient surveillance    #Lethargy possibly due to underlying infection  -repeat cultures as above; continue zosyn and add vanco  -check procal/lactate as above  -check ammonia, B12, TSH  -check VBG to assess PCO2  -CT head stat  -hold psych meds for lethargy; klonopin held this afternoon  -check lithium and clozapine levels  -fall/aspiration precautions  -monitor mental status closely    #Hypotension  -BP responded to fluid bolus  -upgrade to SDU for q2 vitals x 24 hours  -low threshold for MICU evaluation    #DVT Prophylaxis - SCDs    Dispo - Hemodynamic instability after chest tube. Repeat CT chest and check CT abd/pelvis. Repeat cultures; broaden abx coverage. Fluid bolus. Upgrade to SDU for q2 vitals. Low threshold for MICU evaluation.

## 2022-10-18 NOTE — PROGRESS NOTE ADULT - ASSESSMENT
47 year old male patient, current [place] resident, admitted to medicine with c/o left sided abdominal pain/chest discomfort X 4 days. +Dry cough. Patient found with Leukocytosis (WBC >20), low grade fever, and CT chest showing Large Left Loculated Pleural Effusion. Patient in no acute respiratory distress, with SpO2 stable on room air.  10/12 left percutaneous chest tube placed  10/16 CT removed  10/17 Reconsulted for possible pleural effusion reaccumulation increased 02 requirement  10/18 Left chest tube placed without complication

## 2022-10-19 PROBLEM — F25.9 SCHIZOAFFECTIVE DISORDER, UNSPECIFIED: Chronic | Status: ACTIVE | Noted: 2022-10-12

## 2022-10-19 PROBLEM — F31.9 BIPOLAR DISORDER, UNSPECIFIED: Chronic | Status: ACTIVE | Noted: 2022-10-12

## 2022-10-19 PROBLEM — Z87.891 PERSONAL HISTORY OF NICOTINE DEPENDENCE: Chronic | Status: ACTIVE | Noted: 2022-10-12

## 2022-10-19 PROBLEM — Z00.00 ENCOUNTER FOR PREVENTIVE HEALTH EXAMINATION: Status: ACTIVE | Noted: 2022-10-19

## 2022-10-19 LAB
ANION GAP SERPL CALC-SCNC: 6 MMOL/L — SIGNIFICANT CHANGE UP (ref 5–17)
BASOPHILS # BLD AUTO: 0.04 K/UL — SIGNIFICANT CHANGE UP (ref 0–0.2)
BASOPHILS NFR BLD AUTO: 0.3 % — SIGNIFICANT CHANGE UP (ref 0–2)
BUN SERPL-MCNC: 3.7 MG/DL — LOW (ref 8–20)
CALCIUM SERPL-MCNC: 9.2 MG/DL — SIGNIFICANT CHANGE UP (ref 8.4–10.5)
CHLORIDE SERPL-SCNC: 98 MMOL/L — SIGNIFICANT CHANGE UP (ref 98–107)
CLOZAPINE SERPL-MCNC: 859 NG/ML — HIGH (ref 350–600)
CO2 SERPL-SCNC: 32 MMOL/L — HIGH (ref 22–29)
CREAT SERPL-MCNC: 0.54 MG/DL — SIGNIFICANT CHANGE UP (ref 0.5–1.3)
EGFR: 125 ML/MIN/1.73M2 — SIGNIFICANT CHANGE UP
EOSINOPHIL # BLD AUTO: 0.53 K/UL — HIGH (ref 0–0.5)
EOSINOPHIL NFR BLD AUTO: 4.2 % — SIGNIFICANT CHANGE UP (ref 0–6)
GLUCOSE SERPL-MCNC: 107 MG/DL — HIGH (ref 70–99)
HCT VFR BLD CALC: 35.2 % — LOW (ref 39–50)
HGB BLD-MCNC: 11 G/DL — LOW (ref 13–17)
IMM GRANULOCYTES NFR BLD AUTO: 0.8 % — SIGNIFICANT CHANGE UP (ref 0–0.9)
LDH SERPL L TO P-CCNC: 115 U/L — SIGNIFICANT CHANGE UP (ref 98–192)
LYMPHOCYTES # BLD AUTO: 1.58 K/UL — SIGNIFICANT CHANGE UP (ref 1–3.3)
LYMPHOCYTES # BLD AUTO: 12.4 % — LOW (ref 13–44)
MAGNESIUM SERPL-MCNC: 2.1 MG/DL — SIGNIFICANT CHANGE UP (ref 1.6–2.6)
MCHC RBC-ENTMCNC: 29.9 PG — SIGNIFICANT CHANGE UP (ref 27–34)
MCHC RBC-ENTMCNC: 31.3 GM/DL — LOW (ref 32–36)
MCV RBC AUTO: 95.7 FL — SIGNIFICANT CHANGE UP (ref 80–100)
MONOCYTES # BLD AUTO: 1.12 K/UL — HIGH (ref 0–0.9)
MONOCYTES NFR BLD AUTO: 8.8 % — SIGNIFICANT CHANGE UP (ref 2–14)
NEUTROPHILS # BLD AUTO: 9.4 K/UL — HIGH (ref 1.8–7.4)
NEUTROPHILS NFR BLD AUTO: 73.5 % — SIGNIFICANT CHANGE UP (ref 43–77)
PHOSPHATE SERPL-MCNC: 3.1 MG/DL — SIGNIFICANT CHANGE UP (ref 2.4–4.7)
PLATELET # BLD AUTO: 528 K/UL — HIGH (ref 150–400)
POTASSIUM SERPL-MCNC: 3.9 MMOL/L — SIGNIFICANT CHANGE UP (ref 3.5–5.3)
POTASSIUM SERPL-SCNC: 3.9 MMOL/L — SIGNIFICANT CHANGE UP (ref 3.5–5.3)
RBC # BLD: 3.68 M/UL — LOW (ref 4.2–5.8)
RBC # FLD: 13.5 % — SIGNIFICANT CHANGE UP (ref 10.3–14.5)
SODIUM SERPL-SCNC: 136 MMOL/L — SIGNIFICANT CHANGE UP (ref 135–145)
WBC # BLD: 12.77 K/UL — HIGH (ref 3.8–10.5)
WBC # FLD AUTO: 12.77 K/UL — HIGH (ref 3.8–10.5)

## 2022-10-19 PROCEDURE — 99233 SBSQ HOSP IP/OBS HIGH 50: CPT | Mod: 57

## 2022-10-19 PROCEDURE — 99233 SBSQ HOSP IP/OBS HIGH 50: CPT

## 2022-10-19 RX ORDER — BENZOCAINE AND MENTHOL 5; 1 G/100ML; G/100ML
1 LIQUID ORAL
Refills: 0 | Status: DISCONTINUED | OUTPATIENT
Start: 2022-10-19 | End: 2022-10-21

## 2022-10-19 RX ORDER — VANCOMYCIN HCL 1 G
1250 VIAL (EA) INTRAVENOUS EVERY 12 HOURS
Refills: 0 | Status: DISCONTINUED | OUTPATIENT
Start: 2022-10-19 | End: 2022-10-20

## 2022-10-19 RX ORDER — BENZOCAINE AND MENTHOL 5; 1 G/100ML; G/100ML
1 LIQUID ORAL
Refills: 0 | Status: DISCONTINUED | OUTPATIENT
Start: 2022-10-19 | End: 2022-10-19

## 2022-10-19 RX ADMIN — PIPERACILLIN AND TAZOBACTAM 25 GRAM(S): 4; .5 INJECTION, POWDER, LYOPHILIZED, FOR SOLUTION INTRAVENOUS at 13:18

## 2022-10-19 RX ADMIN — Medication 1 DROP(S): at 16:59

## 2022-10-19 RX ADMIN — LITHIUM CARBONATE 450 MILLIGRAM(S): 300 TABLET, EXTENDED RELEASE ORAL at 16:58

## 2022-10-19 RX ADMIN — CLOZAPINE 250 MILLIGRAM(S): 150 TABLET, ORALLY DISINTEGRATING ORAL at 21:02

## 2022-10-19 RX ADMIN — BENZOCAINE AND MENTHOL 1 LOZENGE: 5; 1 LIQUID ORAL at 18:38

## 2022-10-19 RX ADMIN — Medication 166.67 MILLIGRAM(S): at 20:59

## 2022-10-19 RX ADMIN — Medication 1 DROP(S): at 05:21

## 2022-10-19 RX ADMIN — Medication 166.67 MILLIGRAM(S): at 01:08

## 2022-10-19 RX ADMIN — SENNA PLUS 2 TABLET(S): 8.6 TABLET ORAL at 21:01

## 2022-10-19 RX ADMIN — Medication 166.67 MILLIGRAM(S): at 09:07

## 2022-10-19 RX ADMIN — PIPERACILLIN AND TAZOBACTAM 25 GRAM(S): 4; .5 INJECTION, POWDER, LYOPHILIZED, FOR SOLUTION INTRAVENOUS at 05:22

## 2022-10-19 RX ADMIN — Medication 1 PATCH: at 19:00

## 2022-10-19 RX ADMIN — PIPERACILLIN AND TAZOBACTAM 25 GRAM(S): 4; .5 INJECTION, POWDER, LYOPHILIZED, FOR SOLUTION INTRAVENOUS at 22:17

## 2022-10-19 RX ADMIN — Medication 1 PATCH: at 11:39

## 2022-10-19 RX ADMIN — Medication 1 MILLIGRAM(S): at 21:02

## 2022-10-19 NOTE — PROGRESS NOTE ADULT - ASSESSMENT
Patient is a 47 year old male with PMH of Schizoaffective disorder, bipolar disorder and tobacco abuse presents with complaints of left sided chest pain pain and shortness of breath and found to have a large left sided loculated effusion.    #Hypoxia due to Large loculated left sided pleural effusion; unclear etiology  - S/P Chest Tube placed yesterday  - cytology negative for malignant cells; but remarkable for acute inflammation. Repeat pleural fluid studies sent today.  - F/U repeat studies of pleural effusion  - C/W Vancomycin Q12 and Zosyn Q8 empirically  - blood cultures x 2, f/u procal level  - check ABG  - leukocytosis continues to improve  - F/U ID recs   - CT as above  - CTS recs appreciated  - Repeat CXR, ABG and MICU eval today    #Schizoaffective disorder/Bipolar disorder  - hold clonazepam, lithium, clozapine while lethargic, Hold parameters in chart  - check clozapine and lithium levels  - aripiprazole (IM once monthly 10/5/2022),  - psych recs appreciated, discussed with Dr. Ritchie    #Tobacco abuse  - Nicotine patch    #Leukocytosis possibly due to PNA with parapneumonic effusion vs empyema although pleural fluid studies are not suggestive  -pleural fluid cultures are negative  -urine culture negative  -blood cultures negative  -repeat cultures, check procal, lactate  -leukocytosis improving daily on zosyn, add vanco as above  -monitor fever curve; tmax 99  - Repeat CT as above    #Pulmonary nodule  -continue outpatient surveillance    #Lethargy possibly due to underlying infection  -repeat cultures as above; continue zosyn and add vanco  -Lactate <1  -F/U procal  -F/U ammonia, B12, TSH  -check ABG  -CTH neg  -Hold parameters added for psych meds  -f/u lithium and clozapine levels  -fall/aspiration precautions  -monitor mental status closely    #Hypotension  Improvement in BP w/ fluids  -low threshold for MICU evaluation    #DVT Prophylaxis - SCDs    Dispo - Hemodynamic instability after chest tube. Repeat CT chest and check CT abd/pelvis. Repeat cultures; broaden abx coverage. low threshold for MICU eval

## 2022-10-19 NOTE — DIETITIAN INITIAL EVALUATION ADULT - NSFNSGIIOFT_GEN_A_CORE
10-18-22 @ 07:01  -  10-19-22 @ 07:00  --------------------------------------------------------  OUT:    Chest Tube (mL): 460 mL  Total OUT: 460 mL    Total NET: -460 mL

## 2022-10-19 NOTE — ED BEHAVIORAL HEALTH NOTE - BEHAVIORAL HEALTH NOTE
PROGRESS NOTE: 10-19-22 @ 07:58  	  • Reason for Ongoing Consultation: large left loculated pleural effusion     ID: [id number] Male with HEALTH ISSUES - PROBLEM Dx:   schizoaffective disorder, bipolar disorder, tobacco abuse   Pleural effusion, left    INTERVAL DATA:   • Interval Chief Complaint: large left pleural effusion   • Interval History:   Pt laying in bed lethargic. Pt has chest tube in draining left pleural effusion. Cultures pending. Given vancomycin 1250 milliGRAM(s) IV Intermittent every 8 hours and piperacillin/tazobactam IVPB.. 3.375 Gram(s) IV Intermittent every 8 hours. Psych meds currently being held (clonazepam, clozapine, lithium) while lethargic.       REVIEW OF SYSTEMS: unable to obtain due to AMS     REVIEW OF VITALS/LABS/IMAGING/INVESTIGATIONS:   • Vital signs reviewed: Yes  • Vital Signs:	    T(C): 36.9 (10-19-22 @ 04:00), Max: 37.2 (10-18-22 @ 12:24)  HR: 86 (10-19-22 @ 04:00) (85 - 108)  BP: 106/59 (10-19-22 @ 04:00) (93/59 - 118/78)  RR: 17 (10-19-22 @ 04:00) (12 - 20)  SpO2: 96% (10-19-22 @ 04:00) (92% - 100%)    • Available labs reviewed: Yes  • Available Lab Results:                           11.0   12.77 )-----------( 528      ( 19 Oct 2022 05:05 )             35.2     10-19    136  |  98  |  3.7<L>  ----------------------------<  107<H>  3.9   |  32.0<H>  |  0.54    Ca    9.2      19 Oct 2022 05:05  Phos  3.1     10-19  Mg     2.1     10-19        PT/INR - ( 18 Oct 2022 04:40 )   PT: 17.3 sec;   INR: 1.49 ratio         PTT - ( 18 Oct 2022 04:40 )  PTT:30.7 sec        MEDICATIONS:      PRN Medications:  • PRN Medications since last evaluation	  • PRN Details	    Current Medications:   acetaminophen     Tablet .. 650 milliGRAM(s) Oral every 6 hours PRN  aluminum hydroxide/magnesium hydroxide/simethicone Suspension 30 milliLiter(s) Oral every 4 hours PRN  atropine 1% Solution 1 Drop(s) Both EYES two times a day  benzocaine 15 mG/menthol 3.6 mG Lozenge 1 Lozenge Oral every 4 hours PRN  clonazePAM  Tablet 1 milliGRAM(s) Oral three times a day  cloZAPine 250 milliGRAM(s) Oral at bedtime  lidocaine 1% Injectable 20 milliLiter(s) Local Injection once  lidocaine 1% Injectable 30 milliLiter(s) Local Injection once  lithium 450 milliGRAM(s) Oral two times a day  melatonin 3 milliGRAM(s) Oral at bedtime PRN  nicotine -  14 mG/24Hr(s) Patch 1 Patch Transdermal daily  ondansetron Injectable 4 milliGRAM(s) IV Push every 8 hours PRN  oxycodone    5 mG/acetaminophen 325 mG 1 Tablet(s) Oral every 6 hours PRN  piperacillin/tazobactam IVPB.. 3.375 Gram(s) IV Intermittent every 8 hours  senna 2 Tablet(s) Oral at bedtime  vancomycin  IVPB 1250 milliGRAM(s) IV Intermittent every 8 hours     Medication Side Effects:  • Medication Side Effects or Adverse Reactions (new or ongoing)	None known    MENTAL STATUS EXAM: unable to obtain due to AMS     SUICIDALITY:   • Suicidality (Interval)	none known    HOMICIDALITY/AGGRESSION:   • Homicidality/Aggression	none known    DIAGNOSIS DSM-V:    Psychiatric Diagnosis (Corresponds to DSM-IV Axis I, II):   HEALTH ISSUES - PROBLEM Dx:  schizoaffective disorder, bipolar disorder, tobacco abuse   Pleural effusion, left       Medical Diagnosis (Corresponds to DSM-IV Axis III):  • Axis III	  HTN  dyslipidemia       ASSESSMENT OF CURRENT CONDITION:   Summary (include case differential, formulation and patient response to therapy): monitoring pt, waiting for left pleural effusion to resolve     Risk Assessment (consider static vs modifiable risk factors and protective factors; comment on level of risk for dangerous behavior):   Risk factors: pphx, current psychiatric hospitalization     PLAN  hold clonazepam, lithium, clozapine while lethargic   monitor left pleural effusion PROGRESS NOTE: 10-19-22 @ 07:58  	  • Reason for Ongoing Consultation: large left loculated pleural effusion     ID: [id number] Male with HEALTH ISSUES - PROBLEM Dx:   schizoaffective disorder, bipolar disorder, tobacco abuse   Pleural effusion, left    INTERVAL DATA:   • Interval Chief Complaint: large left pleural effusion   • Interval History:   Pt laying in bed lethargic. Pt has chest tube in draining left pleural effusion. Cultures pending. Given vancomycin 1250 milliGRAM(s) IV Intermittent every 8 hours and piperacillin/tazobactam IVPB.. 3.375 Gram(s) IV Intermittent every 8 hours. Psych meds currently on hold (clonazepam, clozapine, lithium) due to lethargy      REVIEW OF SYSTEMS: unable to obtain due to AMS     REVIEW OF VITALS/LABS/IMAGING/INVESTIGATIONS:   • Vital signs reviewed: Yes  • Vital Signs:	    T(C): 36.9 (10-19-22 @ 04:00), Max: 37.2 (10-18-22 @ 12:24)  HR: 86 (10-19-22 @ 04:00) (85 - 108)  BP: 106/59 (10-19-22 @ 04:00) (93/59 - 118/78)  RR: 17 (10-19-22 @ 04:00) (12 - 20)  SpO2: 96% (10-19-22 @ 04:00) (92% - 100%)    • Available labs reviewed: Yes  • Available Lab Results:                           11.0   12.77 )-----------( 528      ( 19 Oct 2022 05:05 )             35.2     10-19    136  |  98  |  3.7<L>  ----------------------------<  107<H>  3.9   |  32.0<H>  |  0.54    Ca    9.2      19 Oct 2022 05:05  Phos  3.1     10-19  Mg     2.1     10-19        PT/INR - ( 18 Oct 2022 04:40 )   PT: 17.3 sec;   INR: 1.49 ratio         PTT - ( 18 Oct 2022 04:40 )  PTT:30.7 sec    Lithium Level, Serum (10.18.22 @ 16:03)    Lithium Level, Serum: 0.85 mmol/L  Clozapine Level, Result: 859: There is no definitive clozapine level that is associated with efficacy  or toxicity. Efficacy for the majority of patients will be highest at  levels above 350 ng/mL. ng/mL (10.18.22 @ 16:03)      MEDICATIONS:      PRN Medications:  • PRN Medications since last evaluation	  • PRN Details	    Current Medications:   acetaminophen     Tablet .. 650 milliGRAM(s) Oral every 6 hours PRN  aluminum hydroxide/magnesium hydroxide/simethicone Suspension 30 milliLiter(s) Oral every 4 hours PRN  atropine 1% Solution 1 Drop(s) Both EYES two times a day  benzocaine 15 mG/menthol 3.6 mG Lozenge 1 Lozenge Oral every 4 hours PRN  clonazePAM  Tablet 1 milliGRAM(s) Oral three times a day  cloZAPine 250 milliGRAM(s) Oral at bedtime  lidocaine 1% Injectable 20 milliLiter(s) Local Injection once  lidocaine 1% Injectable 30 milliLiter(s) Local Injection once  lithium 450 milliGRAM(s) Oral two times a day  melatonin 3 milliGRAM(s) Oral at bedtime PRN  nicotine -  14 mG/24Hr(s) Patch 1 Patch Transdermal daily  ondansetron Injectable 4 milliGRAM(s) IV Push every 8 hours PRN  oxycodone    5 mG/acetaminophen 325 mG 1 Tablet(s) Oral every 6 hours PRN  piperacillin/tazobactam IVPB.. 3.375 Gram(s) IV Intermittent every 8 hours  senna 2 Tablet(s) Oral at bedtime  vancomycin  IVPB 1250 milliGRAM(s) IV Intermittent every 8 hours     Medication Side Effects:  • Medication Side Effects or Adverse Reactions (new or ongoing)	None known    MENTAL STATUS EXAM: unable to obtain due to AMS     SUICIDALITY:   • Suicidality (Interval)	none known    HOMICIDALITY/AGGRESSION:   • Homicidality/Aggression	none known    DIAGNOSIS DSM-V:    Psychiatric Diagnosis (Corresponds to DSM-IV Axis I, II):   HEALTH ISSUES - PROBLEM Dx:  schizoaffective disorder, bipolar disorder, tobacco abuse   Pleural effusion, left       Medical Diagnosis (Corresponds to DSM-IV Axis III):  • Axis III	  HTN  dyslipidemia       ASSESSMENT OF CURRENT CONDITION:   Summary (include case differential, formulation and patient response to therapy): monitoring pt, waiting for left pleural effusion to resolve     Risk Assessment (consider static vs modifiable risk factors and protective factors; comment on level of risk for dangerous behavior):   Risk factors: pphx, current psychiatric hospitalization     PLAN  hold clonazepam, lithium, clozapine while lethargic   monitor left pleural effusion PROGRESS NOTE: 10-19-22 @ 07:58  	  • Reason for Ongoing Consultation: large left loculated pleural effusion     ID: [id number] Male with HEALTH ISSUES - PROBLEM Dx:   schizoaffective disorder, bipolar disorder, tobacco abuse   Pleural effusion, left    INTERVAL DATA:   • Interval Chief Complaint: large left pleural effusion   • Interval History:   Pt laying in bed lethargic. Pt has chest tube in draining left pleural effusion. Cultures pending. Given vancomycin 1250 milliGRAM(s) IV Intermittent every 8 hours and piperacillin/tazobactam IVPB.. 3.375 Gram(s) IV Intermittent every 8 hours. Psych meds currently on hold (clonazepam, clozapine, lithium) due to lethargy      REVIEW OF SYSTEMS: unable to obtain due to AMS     REVIEW OF VITALS/LABS/IMAGING/INVESTIGATIONS:   • Vital signs reviewed: Yes  • Vital Signs:	    T(C): 36.9 (10-19-22 @ 04:00), Max: 37.2 (10-18-22 @ 12:24)  HR: 86 (10-19-22 @ 04:00) (85 - 108)  BP: 106/59 (10-19-22 @ 04:00) (93/59 - 118/78)  RR: 17 (10-19-22 @ 04:00) (12 - 20)  SpO2: 96% (10-19-22 @ 04:00) (92% - 100%)    • Available labs reviewed: Yes  • Available Lab Results:                           11.0   12.77 )-----------( 528      ( 19 Oct 2022 05:05 )             35.2     10-19    136  |  98  |  3.7<L>  ----------------------------<  107<H>  3.9   |  32.0<H>  |  0.54    Ca    9.2      19 Oct 2022 05:05  Phos  3.1     10-19  Mg     2.1     10-19        PT/INR - ( 18 Oct 2022 04:40 )   PT: 17.3 sec;   INR: 1.49 ratio         PTT - ( 18 Oct 2022 04:40 )  PTT:30.7 sec    Lithium Level, Serum (10.18.22 @ 16:03)    Lithium Level, Serum: 0.85 mmol/L  Clozapine Level, Result: 859: There is no definitive clozapine level that is associated with efficacy  or toxicity. Efficacy for the majority of patients will be highest at  levels above 350 ng/mL. ng/mL (10.18.22 @ 16:03)      MEDICATIONS:      PRN Medications:  • PRN Medications since last evaluation	  • PRN Details	    Current Medications:   acetaminophen     Tablet .. 650 milliGRAM(s) Oral every 6 hours PRN  aluminum hydroxide/magnesium hydroxide/simethicone Suspension 30 milliLiter(s) Oral every 4 hours PRN  atropine 1% Solution 1 Drop(s) Both EYES two times a day  benzocaine 15 mG/menthol 3.6 mG Lozenge 1 Lozenge Oral every 4 hours PRN  clonazePAM  Tablet 1 milliGRAM(s) Oral three times a day  cloZAPine 250 milliGRAM(s) Oral at bedtime  lidocaine 1% Injectable 20 milliLiter(s) Local Injection once  lidocaine 1% Injectable 30 milliLiter(s) Local Injection once  lithium 450 milliGRAM(s) Oral two times a day  melatonin 3 milliGRAM(s) Oral at bedtime PRN  nicotine -  14 mG/24Hr(s) Patch 1 Patch Transdermal daily  ondansetron Injectable 4 milliGRAM(s) IV Push every 8 hours PRN  oxycodone    5 mG/acetaminophen 325 mG 1 Tablet(s) Oral every 6 hours PRN  piperacillin/tazobactam IVPB.. 3.375 Gram(s) IV Intermittent every 8 hours  senna 2 Tablet(s) Oral at bedtime  vancomycin  IVPB 1250 milliGRAM(s) IV Intermittent every 8 hours     Medication Side Effects:  • Medication Side Effects or Adverse Reactions (new or ongoing)	None known    MENTAL STATUS EXAM: unable to obtain due to AMS     SUICIDALITY:   • Suicidality (Interval)	none known    HOMICIDALITY/AGGRESSION:   • Homicidality/Aggression	none known    DIAGNOSIS DSM-V:    Psychiatric Diagnosis (Corresponds to DSM-IV Axis I, II):   HEALTH ISSUES - PROBLEM Dx: delirium suspected infectious process  schizoaffective disorder, bipolar disorder, tobacco abuse   Pleural effusion, left       Medical Diagnosis (Corresponds to DSM-IV Axis III):  • Axis III	  HTN  dyslipidemia       ASSESSMENT OF CURRENT CONDITION:   Summary (include case differential, formulation and patient response to therapy): monitoring pt, waiting for left pleural effusion to resolve     Risk Assessment (consider static vs modifiable risk factors and protective factors; comment on level of risk for dangerous behavior):   Risk factors: pphx, current psychiatric hospitalization     PLAN  hold clonazepam, lithium, clozapine while lethargic   monitor left pleural effusion

## 2022-10-19 NOTE — DIETITIAN INITIAL EVALUATION ADULT - PERTINENT LABORATORY DATA
10-19    136  |  98  |  3.7<L>  ----------------------------<  107<H>  3.9   |  32.0<H>  |  0.54    Ca    9.2      19 Oct 2022 05:05  Phos  3.1     10-19  Mg     2.1     10-19    A1C with Estimated Average Glucose Result: 5.4 % (10-13-22 @ 06:13)

## 2022-10-19 NOTE — DIETITIAN INITIAL EVALUATION ADULT - ORAL INTAKE PTA/DIET HISTORY
Pt reported poor po intake/appetite x 1 month PTA. Unable to provide UBW but noted unintentional wt loss. Pt continues with suboptimal po intake at this time. Encouraged small frequent meals and HBV protein. Pt willing to trial Ensure with meals.

## 2022-10-19 NOTE — PROGRESS NOTE ADULT - ASSESSMENT
47 year old male patient, current [place] resident, admitted to medicine with c/o left sided abdominal pain/chest discomfort X 4 days. +Dry cough. Patient found with Leukocytosis (WBC >20), low grade fever, and CT chest showing Large Left Loculated Pleural Effusion. Patient in no acute respiratory distress, with SpO2 stable on room air.  10/12 left percutaneous chest tube placed  10/16 CT removed  10/17 Reconsulted for possible pleural effusion reaccumulation increased 02 requirement  10/18 Left chest tube placed without complication

## 2022-10-19 NOTE — PROGRESS NOTE ADULT - SUBJECTIVE AND OBJECTIVE BOX
[facility] Division of Hospital Medicine    Chief Complaint:  Large Left loculated pleural effusion    SUBJECTIVE / OVERNIGHT EVENTS:  Overnight, MICU consulted for reaccumulation of pleural fluid. CXR overnight showing large reaccumulation of fluid. New chest tube placed by CT surgery, switched to suction today. Pt will be reassessed by MICU today for possible MICU transfer. Pt seen at bedside, in NAD, lethargic. Opening eyes and weakly following commands however likely lethargic 2/2 psych meds. Unable to obtain full ROS due to mental status at this time    MEDICATIONS  (STANDING):  atropine 1% Solution 1 Drop(s) Both EYES two times a day  clonazePAM  Tablet 1 milliGRAM(s) Oral three times a day  cloZAPine 250 milliGRAM(s) Oral at bedtime  lidocaine 1% Injectable 20 milliLiter(s) Local Injection once  lidocaine 1% Injectable 30 milliLiter(s) Local Injection once  lithium 450 milliGRAM(s) Oral two times a day  nicotine -  14 mG/24Hr(s) Patch 1 Patch Transdermal daily  piperacillin/tazobactam IVPB.. 3.375 Gram(s) IV Intermittent every 8 hours  senna 2 Tablet(s) Oral at bedtime  vancomycin  IVPB 1250 milliGRAM(s) IV Intermittent every 8 hours    MEDICATIONS  (PRN):  acetaminophen     Tablet .. 650 milliGRAM(s) Oral every 6 hours PRN Temp greater or equal to 38C (100.4F), Mild Pain (1 - 3)  aluminum hydroxide/magnesium hydroxide/simethicone Suspension 30 milliLiter(s) Oral every 4 hours PRN Dyspepsia  benzocaine 15 mG/menthol 3.6 mG Lozenge 1 Lozenge Oral every 4 hours PRN Sore Throat  melatonin 3 milliGRAM(s) Oral at bedtime PRN Insomnia  ondansetron Injectable 4 milliGRAM(s) IV Push every 8 hours PRN Nausea and/or Vomiting  oxycodone    5 mG/acetaminophen 325 mG 1 Tablet(s) Oral every 6 hours PRN Severe Pain (7 - 10)        I&O's Summary    18 Oct 2022 07:01  -  19 Oct 2022 07:00  --------------------------------------------------------  IN: 480 mL / OUT: 1560 mL / NET: -1080 mL        PHYSICAL EXAM:  Vital Signs Last 24 Hrs  T(C): 36.5 (19 Oct 2022 08:09), Max: 37.2 (18 Oct 2022 12:24)  T(F): 97.7 (19 Oct 2022 08:09), Max: 99 (18 Oct 2022 12:24)  HR: 88 (19 Oct 2022 08:00) (85 - 108)  BP: 126/55 (19 Oct 2022 08:00) (93/59 - 126/55)  BP(mean): 78 (18 Oct 2022 18:18) (78 - 78)  RR: 22 (19 Oct 2022 08:00) (12 - 22)  SpO2: 95% (19 Oct 2022 08:00) (92% - 100%)    Parameters below as of 19 Oct 2022 08:00  Patient On (Oxygen Delivery Method): nasal cannula  O2 Flow (L/min): 3      GENERAL: middle aged male, laying in bed, pale, NAD  HEAD:  Atraumatic, Normocephalic  EYES: PERRL, conjunctiva and sclera clear  ENMT: Moist mucous membranes  NECK: Supple   NERVOUS SYSTEM:  Verbally alert, weakly responsive, unable to to assess due to mental status  CHEST/LUNG: diminished breath sounds;  + chest tube  HEART: Regular rate and rhythm; + S1/S2  ABDOMEN: Soft, Nontender, Nondistended; Bowel sounds present  EXTREMITIES:  no pedal edema    LABS:                        11.0   12.77 )-----------( 528      ( 19 Oct 2022 05:05 )             35.2     10-19    136  |  98  |  3.7<L>  ----------------------------<  107<H>  3.9   |  32.0<H>  |  0.54    Ca    9.2      19 Oct 2022 05:05  Phos  3.1     10-19  Mg     2.1     10-19      PT/INR - ( 18 Oct 2022 04:40 )   PT: 17.3 sec;   INR: 1.49 ratio         PTT - ( 18 Oct 2022 04:40 )  PTT:30.7 sec          Culture - Fungal, Body Fluid (collected 18 Oct 2022 09:30)  Source: Pleural Fl Pleural Fluid  Preliminary Report (19 Oct 2022 07:44):    Testing in progress    Culture - Body Fluid with Gram Stain (collected 18 Oct 2022 09:30)  Source: Pleural Fl Pleural Fluid  Gram Stain (18 Oct 2022 23:35):    No polymorphonuclear leukocytes seen per low power field    No organisms seen per oil power field      CAPILLARY BLOOD GLUCOSE            RADIOLOGY & ADDITIONAL TESTS:  Results Reviewed: y  Imaging Personally Reviewed: y  Electrocardiogram Personally Reviewed: y        CT T/A/P:   Persistent moderate to large multiloculated left-sided pleural effusion,   including small amount of air within the component along the anterior   mediastinal pleura.    Interval placement of left-sided chest tube.    Splenomegaly.    Infrarenal abdominal aortic aneurysm measuring 5 cm.  Bilateral common iliac artery aneurysms as discussed

## 2022-10-19 NOTE — PROGRESS NOTE ADULT - NS ATTEND AMEND GEN_ALL_CORE FT
Pt for decort friday    35 Minutes was spent during this encounter.
Pt will likely need decort.    35 Minutes was spent during this encounter.
Will leave tube.  May need pleurx next week.    35 Minutes was spent during this encounter.

## 2022-10-19 NOTE — PROGRESS NOTE ADULT - PROBLEM SELECTOR PLAN 1
10/17 Re-consulted for reaccumulation of pleural effusion.    -10/17 CT chest: Moderate/large multiloculated left pleural effusion is present. The component located along the mediastinal pleura has markedly decreased in size and contains small amount of gas measuring 3.7 x 3.1 cm (image 221, series 4) and previously was 7.8 x 7.8 cm. Loculated components along the cranial aspect of the left major fissure and posterolaterally are identified and are similar in size.  -CT placed as in on suction  Continuous SpO2 monitoring.  Maintain SpO2 >92%. Supplement with O2 therapy PRN.   Prior Pleural fluid  -Exudative effusion per light's criteria, however culture and cytology negative  New pleural fluid specimen sent 10/18  Encourage cough and deep breathing/IS use     Plan for Decortication Friday as per Dr Abreu  Rest of care per primary team  Plan discussed with Thoracic Surgery Attending/Team in AM rounds

## 2022-10-19 NOTE — DIETITIAN INITIAL EVALUATION ADULT - PERTINENT MEDS FT
MEDICATIONS  (STANDING):  atropine 1% Solution 1 Drop(s) Both EYES two times a day  clonazePAM  Tablet 1 milliGRAM(s) Oral three times a day  cloZAPine 250 milliGRAM(s) Oral at bedtime  lidocaine 1% Injectable 20 milliLiter(s) Local Injection once  lidocaine 1% Injectable 30 milliLiter(s) Local Injection once  lithium 450 milliGRAM(s) Oral two times a day  nicotine -  14 mG/24Hr(s) Patch 1 Patch Transdermal daily  piperacillin/tazobactam IVPB.. 3.375 Gram(s) IV Intermittent every 8 hours  senna 2 Tablet(s) Oral at bedtime  vancomycin  IVPB 1250 milliGRAM(s) IV Intermittent every 8 hours    MEDICATIONS  (PRN):  acetaminophen     Tablet .. 650 milliGRAM(s) Oral every 6 hours PRN Temp greater or equal to 38C (100.4F), Mild Pain (1 - 3)  aluminum hydroxide/magnesium hydroxide/simethicone Suspension 30 milliLiter(s) Oral every 4 hours PRN Dyspepsia  benzocaine 15 mG/menthol 3.6 mG Lozenge 1 Lozenge Oral every 4 hours PRN Sore Throat  melatonin 3 milliGRAM(s) Oral at bedtime PRN Insomnia  ondansetron Injectable 4 milliGRAM(s) IV Push every 8 hours PRN Nausea and/or Vomiting  oxycodone    5 mG/acetaminophen 325 mG 1 Tablet(s) Oral every 6 hours PRN Severe Pain (7 - 10)

## 2022-10-19 NOTE — DIETITIAN INITIAL EVALUATION ADULT - ADD RECOMMEND
Add Ensure Enlive TID to optimize po intake and provide an additional 350kcal, 20g protein per serving  RX: MVI daily

## 2022-10-20 ENCOUNTER — TRANSCRIPTION ENCOUNTER (OUTPATIENT)
Age: 47
End: 2022-10-20

## 2022-10-20 LAB
ANION GAP SERPL CALC-SCNC: 10 MMOL/L — SIGNIFICANT CHANGE UP (ref 5–17)
APTT BLD: 33.4 SEC — SIGNIFICANT CHANGE UP (ref 27.5–35.5)
BASE EXCESS BLDA CALC-SCNC: 11.8 MMOL/L — HIGH (ref -2–3)
BLD GP AB SCN SERPL QL: SIGNIFICANT CHANGE UP
BLOOD GAS COMMENTS ARTERIAL: SIGNIFICANT CHANGE UP
BUN SERPL-MCNC: 5 MG/DL — LOW (ref 8–20)
CALCIUM SERPL-MCNC: 9.3 MG/DL — SIGNIFICANT CHANGE UP (ref 8.4–10.5)
CHLORIDE SERPL-SCNC: 99 MMOL/L — SIGNIFICANT CHANGE UP (ref 98–107)
CO2 SERPL-SCNC: 30 MMOL/L — HIGH (ref 22–29)
CREAT SERPL-MCNC: 0.68 MG/DL — SIGNIFICANT CHANGE UP (ref 0.5–1.3)
EGFR: 117 ML/MIN/1.73M2 — SIGNIFICANT CHANGE UP
GAS PNL BLDA: SIGNIFICANT CHANGE UP
GLUCOSE SERPL-MCNC: 113 MG/DL — HIGH (ref 70–99)
HCO3 BLDA-SCNC: 36 MMOL/L — HIGH (ref 21–28)
HCT VFR BLD CALC: 33.7 % — LOW (ref 39–50)
HGB BLD-MCNC: 10.5 G/DL — LOW (ref 13–17)
HOROWITZ INDEX BLDA+IHG-RTO: SIGNIFICANT CHANGE UP
INR BLD: 1.46 RATIO — HIGH (ref 0.88–1.16)
MAGNESIUM SERPL-MCNC: 2 MG/DL — SIGNIFICANT CHANGE UP (ref 1.8–2.6)
MCHC RBC-ENTMCNC: 29.2 PG — SIGNIFICANT CHANGE UP (ref 27–34)
MCHC RBC-ENTMCNC: 31.2 GM/DL — LOW (ref 32–36)
MCV RBC AUTO: 93.6 FL — SIGNIFICANT CHANGE UP (ref 80–100)
PCO2 BLDA: 53 MMHG — HIGH (ref 35–48)
PH BLDA: 7.44 — SIGNIFICANT CHANGE UP (ref 7.35–7.45)
PHOSPHATE SERPL-MCNC: 3.5 MG/DL — SIGNIFICANT CHANGE UP (ref 2.4–4.7)
PLATELET # BLD AUTO: 515 K/UL — HIGH (ref 150–400)
PO2 BLDA: 62 MMHG — LOW (ref 83–108)
POTASSIUM SERPL-MCNC: 3.8 MMOL/L — SIGNIFICANT CHANGE UP (ref 3.5–5.3)
POTASSIUM SERPL-SCNC: 3.8 MMOL/L — SIGNIFICANT CHANGE UP (ref 3.5–5.3)
PROTHROM AB SERPL-ACNC: 17 SEC — HIGH (ref 10.5–13.4)
RBC # BLD: 3.6 M/UL — LOW (ref 4.2–5.8)
RBC # FLD: 13.6 % — SIGNIFICANT CHANGE UP (ref 10.3–14.5)
SAO2 % BLDA: 93.8 % — LOW (ref 94–98)
SARS-COV-2 RNA SPEC QL NAA+PROBE: SIGNIFICANT CHANGE UP
SODIUM SERPL-SCNC: 139 MMOL/L — SIGNIFICANT CHANGE UP (ref 135–145)
VANCOMYCIN TROUGH SERPL-MCNC: 9.4 UG/ML — LOW (ref 10–20)
WBC # BLD: 12.65 K/UL — HIGH (ref 3.8–10.5)
WBC # FLD AUTO: 12.65 K/UL — HIGH (ref 3.8–10.5)

## 2022-10-20 PROCEDURE — 99232 SBSQ HOSP IP/OBS MODERATE 35: CPT

## 2022-10-20 PROCEDURE — 99231 SBSQ HOSP IP/OBS SF/LOW 25: CPT

## 2022-10-20 RX ORDER — BUPIVACAINE 13.3 MG/ML
20 INJECTION, SUSPENSION, LIPOSOMAL INFILTRATION ONCE
Refills: 0 | Status: DISCONTINUED | OUTPATIENT
Start: 2022-10-21 | End: 2022-10-21

## 2022-10-20 RX ORDER — VANCOMYCIN HCL 1 G
1500 VIAL (EA) INTRAVENOUS
Refills: 0 | Status: DISCONTINUED | OUTPATIENT
Start: 2022-10-20 | End: 2022-10-21

## 2022-10-20 RX ADMIN — Medication 300 MILLIGRAM(S): at 10:04

## 2022-10-20 RX ADMIN — PIPERACILLIN AND TAZOBACTAM 25 GRAM(S): 4; .5 INJECTION, POWDER, LYOPHILIZED, FOR SOLUTION INTRAVENOUS at 05:39

## 2022-10-20 RX ADMIN — Medication 1 PATCH: at 10:01

## 2022-10-20 RX ADMIN — CLOZAPINE 250 MILLIGRAM(S): 150 TABLET, ORALLY DISINTEGRATING ORAL at 21:28

## 2022-10-20 RX ADMIN — Medication 1 PATCH: at 07:55

## 2022-10-20 RX ADMIN — Medication 1 PATCH: at 10:04

## 2022-10-20 RX ADMIN — PIPERACILLIN AND TAZOBACTAM 25 GRAM(S): 4; .5 INJECTION, POWDER, LYOPHILIZED, FOR SOLUTION INTRAVENOUS at 21:26

## 2022-10-20 RX ADMIN — Medication 1 DROP(S): at 17:28

## 2022-10-20 RX ADMIN — Medication 1 DROP(S): at 05:39

## 2022-10-20 RX ADMIN — Medication 1 PATCH: at 20:07

## 2022-10-20 RX ADMIN — LITHIUM CARBONATE 450 MILLIGRAM(S): 300 TABLET, EXTENDED RELEASE ORAL at 17:28

## 2022-10-20 RX ADMIN — PIPERACILLIN AND TAZOBACTAM 25 GRAM(S): 4; .5 INJECTION, POWDER, LYOPHILIZED, FOR SOLUTION INTRAVENOUS at 13:27

## 2022-10-20 RX ADMIN — Medication 300 MILLIGRAM(S): at 21:26

## 2022-10-20 NOTE — PROGRESS NOTE ADULT - ASSESSMENT
47 year old male patient, current [place] resident, admitted to medicine with c/o left sided abdominal pain/chest discomfort X 4 days. +Dry cough. Patient found with Leukocytosis (WBC >20), low grade fever, and CT chest showing Large Left Loculated Pleural Effusion. Patient in no acute respiratory distress, with SpO2 stable on room air.  10/12 left percutaneous chest tube placed  10/16 CT removed  10/17 Reconsulted for possible pleural effusion reaccumulation increased 02 requirement  10/18 Left chest tube placed without complication     FOR OR IN AM   NPO after midnight   COVID Pending   Type and screen x 1 performed, 1 more pending in AM   rest of care per primary team

## 2022-10-20 NOTE — PROGRESS NOTE ADULT - SUBJECTIVE AND OBJECTIVE BOX
47 year old male with PMH of Schizoaffective disorder, bipolar and tobacco abuse presented to the ED with complaint of left sided pain and shortness of breath.  He was diagnosed with a large loculated left pleural effusion and is scheduled to undergo flexible bronchoscopy, left VATS, and decortication tomorrow.  After thorough chart review, no further work-up recommended prior to surgery.  Ultimately, decision to proceed with surgery is deferred to attending anesthesiologist on day of surgery.    Salo Baez DO  Attending Anesthesiologist

## 2022-10-20 NOTE — PROGRESS NOTE ADULT - SUBJECTIVE AND OBJECTIVE BOX
SUBJECTIVE       INTERIM HISTORY SIGNIFICANT FOR   Planned for decortication in AM     Patient is a 47y old  Male who presents with a chief complaint of Large Left loculated pleural effusion (19 Oct 2022 10:57)    HPI:  48 yo male with hx of Schizoaffective disorder, bipolar and tobacco abuse presents with complaints of left sided pain and shortness of breath. Patient reports 4 day  history of body aches with poor appetite. He has only been drinking liquids at home. Patient was found to be tachycardiac up to 140s at [place] and was therefore sent in for an evaluation. Patient endorses subjective fevers associated with a dry cough while at [place]. While in the ED, cxr and CT chest was obtained which showed a large loculated left pleural effusion. CT surgery was consulted and admission to medicine was requested.  (12 Oct 2022 08:47)    OBJECTIVE  PAST MEDICAL & SURGICAL HISTORY:  Schizoaffective disorder      Bipolar disorder      History of tobacco abuse      S/P tonsillectomy        No Known Allergies    Home Medications:  ARIPiprazole 400 mg intramuscular injection, extended release: 400 milligram(s) intramuscular every 28 days (12 Oct 2022 08:29)  atropine 1% ophthalmic solution: 2 drop(s) to each affected eye 2 times a day (12 Oct 2022 08:29)  clonazePAM 1 mg oral tablet: 1 tab(s) orally 3 times a day (12 Oct 2022 08:29)  cloZAPine: 250 milligram(s) orally once a day (at bedtime) (12 Oct 2022 08:29)  docusate sodium 100 mg oral tablet: 1 tab(s) orally 3 times a day, As Needed (12 Oct 2022 08:29)  lithium 150 mg oral capsule: 3 cap(s) orally 2 times a day (12 Oct 2022 08:29)  nicotine 14 mg/24 hr transdermal film, extended release: 1 patch transdermal once a day (12 Oct 2022 08:29)  Senna 8.6 mg oral tablet: 2 tab(s) orally once a day (at bedtime) (12 Oct 2022 08:29)    VITALS  Currently in sinus rhythm with vitals as below  ICU Vital Signs Last 24 Hrs  T(C): 37.1 (20 Oct 2022 04:00), Max: 37.4 (19 Oct 2022 21:00)  T(F): 98.7 (20 Oct 2022 04:00), Max: 99.3 (19 Oct 2022 21:00)  HR: 96 (20 Oct 2022 04:00) (88 - 107)  BP: 111/71 (20 Oct 2022 04:00) (111/71 - 137/85)  BP(mean): 80 (20 Oct 2022 04:00) (80 - 87)  ABP: --  ABP(mean): --  RR: 22 (20 Oct 2022 04:00) (22 - 26)  SpO2: 94% (20 Oct 2022 04:00) (91% - 95%)    O2 Parameters below as of 20 Oct 2022 04:00  Patient On (Oxygen Delivery Method): nasal cannula  O2 Flow (L/min): 3        LABS                        10.5   12.65 )-----------( 515      ( 20 Oct 2022 05:00 )             33.7     Culture - Blood (collected 18 Oct 2022 16:06)  Source: .Blood Blood-Peripheral  Preliminary Report (19 Oct 2022 23:01):    No growth to date.    Culture - Blood (collected 18 Oct 2022 16:03)  Source: .Blood Blood-Peripheral  Preliminary Report (19 Oct 2022 23:01):    No growth to date.    Culture - Fungal, Body Fluid (collected 18 Oct 2022 09:30)  Source: Pleural Fl Pleural Fluid  Preliminary Report (19 Oct 2022 07:44):    Testing in progress    Culture - Body Fluid with Gram Stain (collected 18 Oct 2022 09:30)  Source: Pleural Fl Pleural Fluid  Gram Stain (18 Oct 2022 23:35):    No polymorphonuclear leukocytes seen per low power field    No organisms seen per oil power field  Preliminary Report (19 Oct 2022 12:50):    No growth    PT/INR - ( 20 Oct 2022 05:00 )   PT: 17.0 sec;   INR: 1.46 ratio         PTT - ( 20 Oct 2022 05:00 )  PTT:33.4 [id number]    139  |  99  |  5.0<L>  ----------------------------<  113<H>  3.8   |  30.0<H>  |  0.68    Ca    9.3      20 Oct 2022 05:00  Phos  3.5     10-20  Mg     2.0     10-20    CAPILLARY BLOOD GLUCOSE          ABG - ( 18 Oct 2022 21:50 )  pH, Arterial: 7.450 pH, Blood: x     /  pCO2: 57    /  pO2: 60    / HCO3: 40    / Base Excess: 15.6  /  SaO2: 94.5                IN/OUT    10-19-22 @ 07:01  -  10-20-22 @ 07:00  --------------------------------------------------------  IN: 0 mL / OUT: 2960 mL / NET: -2960 mL      IMAGING  personally reviewed imaging     CURRENT MEDICATIONS  MEDICATIONS  (STANDING):  atropine 1% Solution 1 Drop(s) Both EYES two times a day  cloZAPine 250 milliGRAM(s) Oral at bedtime  lidocaine 1% Injectable 20 milliLiter(s) Local Injection once  lidocaine 1% Injectable 30 milliLiter(s) Local Injection once  lithium 450 milliGRAM(s) Oral two times a day  nicotine -  14 mG/24Hr(s) Patch 1 Patch Transdermal daily  piperacillin/tazobactam IVPB.. 3.375 Gram(s) IV Intermittent every 8 hours  senna 2 Tablet(s) Oral at bedtime  vancomycin  IVPB 1250 milliGRAM(s) IV Intermittent every 12 hours    MEDICATIONS  (PRN):  acetaminophen     Tablet .. 650 milliGRAM(s) Oral every 6 hours PRN Temp greater or equal to 38C (100.4F), Mild Pain (1 - 3)  aluminum hydroxide/magnesium hydroxide/simethicone Suspension 30 milliLiter(s) Oral every 4 hours PRN Dyspepsia  benzocaine 15 mG/menthol 3.6 mG Lozenge 1 Lozenge Oral every 4 hours PRN Sore Throat  benzocaine 15 mG/menthol 3.6 mG Lozenge 1 Lozenge Oral four times a day PRN Sore Throat  melatonin 3 milliGRAM(s) Oral at bedtime PRN Insomnia  ondansetron Injectable 4 milliGRAM(s) IV Push every 8 hours PRN Nausea and/or Vomiting  oxycodone    5 mG/acetaminophen 325 mG 1 Tablet(s) Oral every 6 hours PRN Severe Pain (7 - 10)     SUBJECTIVE   "am i going to make it"   without acute complaints at this time     INTERIM HISTORY SIGNIFICANT FOR   Planned for decortication in AM     Patient is a 47y old  Male who presents with a chief complaint of Large Left loculated pleural effusion (19 Oct 2022 10:57)    HPI:  46 yo male with hx of Schizoaffective disorder, bipolar and tobacco abuse presents with complaints of left sided pain and shortness of breath. Patient reports 4 day  history of body aches with poor appetite. He has only been drinking liquids at home. Patient was found to be tachycardiac up to 140s at [place] and was therefore sent in for an evaluation. Patient endorses subjective fevers associated with a dry cough while at [place]. While in the ED, cxr and CT chest was obtained which showed a large loculated left pleural effusion. CT surgery was consulted and admission to medicine was requested.  (12 Oct 2022 08:47)    OBJECTIVE  PAST MEDICAL & SURGICAL HISTORY:  Schizoaffective disorder      Bipolar disorder      History of tobacco abuse      S/P tonsillectomy        No Known Allergies    Home Medications:  ARIPiprazole 400 mg intramuscular injection, extended release: 400 milligram(s) intramuscular every 28 days (12 Oct 2022 08:29)  atropine 1% ophthalmic solution: 2 drop(s) to each affected eye 2 times a day (12 Oct 2022 08:29)  clonazePAM 1 mg oral tablet: 1 tab(s) orally 3 times a day (12 Oct 2022 08:29)  cloZAPine: 250 milligram(s) orally once a day (at bedtime) (12 Oct 2022 08:29)  docusate sodium 100 mg oral tablet: 1 tab(s) orally 3 times a day, As Needed (12 Oct 2022 08:29)  lithium 150 mg oral capsule: 3 cap(s) orally 2 times a day (12 Oct 2022 08:29)  nicotine 14 mg/24 hr transdermal film, extended release: 1 patch transdermal once a day (12 Oct 2022 08:29)  Senna 8.6 mg oral tablet: 2 tab(s) orally once a day (at bedtime) (12 Oct 2022 08:29)    VITALS  Currently in sinus rhythm with vitals as below  ICU Vital Signs Last 24 Hrs  T(C): 37.1 (20 Oct 2022 04:00), Max: 37.4 (19 Oct 2022 21:00)  T(F): 98.7 (20 Oct 2022 04:00), Max: 99.3 (19 Oct 2022 21:00)  HR: 96 (20 Oct 2022 04:00) (88 - 107)  BP: 111/71 (20 Oct 2022 04:00) (111/71 - 137/85)  BP(mean): 80 (20 Oct 2022 04:00) (80 - 87)  ABP: --  ABP(mean): --  RR: 22 (20 Oct 2022 04:00) (22 - 26)  SpO2: 94% (20 Oct 2022 04:00) (91% - 95%)    O2 Parameters below as of 20 Oct 2022 04:00  Patient On (Oxygen Delivery Method): nasal cannula  O2 Flow (L/min): 3        LABS                        10.5   12.65 )-----------( 515      ( 20 Oct 2022 05:00 )             33.7     Culture - Blood (collected 18 Oct 2022 16:06)  Source: .Blood Blood-Peripheral  Preliminary Report (19 Oct 2022 23:01):    No growth to date.    Culture - Blood (collected 18 Oct 2022 16:03)  Source: .Blood Blood-Peripheral  Preliminary Report (19 Oct 2022 23:01):    No growth to date.    Culture - Fungal, Body Fluid (collected 18 Oct 2022 09:30)  Source: Pleural Fl Pleural Fluid  Preliminary Report (19 Oct 2022 07:44):    Testing in progress    Culture - Body Fluid with Gram Stain (collected 18 Oct 2022 09:30)  Source: Pleural Fl Pleural Fluid  Gram Stain (18 Oct 2022 23:35):    No polymorphonuclear leukocytes seen per low power field    No organisms seen per oil power field  Preliminary Report (19 Oct 2022 12:50):    No growth    PT/INR - ( 20 Oct 2022 05:00 )   PT: 17.0 sec;   INR: 1.46 ratio         PTT - ( 20 Oct 2022 05:00 )  PTT:33.4 [id number]    139  |  99  |  5.0<L>  ----------------------------<  113<H>  3.8   |  30.0<H>  |  0.68    Ca    9.3      20 Oct 2022 05:00  Phos  3.5     10-20  Mg     2.0     10-20    CAPILLARY BLOOD GLUCOSE          ABG - ( 18 Oct 2022 21:50 )  pH, Arterial: 7.450 pH, Blood: x     /  pCO2: 57    /  pO2: 60    / HCO3: 40    / Base Excess: 15.6  /  SaO2: 94.5                IN/OUT    10-19-22 @ 07:01  -  10-20-22 @ 07:00  --------------------------------------------------------  IN: 0 mL / OUT: 2960 mL / NET: -2960 mL      IMAGING  personally reviewed imaging     CURRENT MEDICATIONS  MEDICATIONS  (STANDING):  atropine 1% Solution 1 Drop(s) Both EYES two times a day  cloZAPine 250 milliGRAM(s) Oral at bedtime  lidocaine 1% Injectable 20 milliLiter(s) Local Injection once  lidocaine 1% Injectable 30 milliLiter(s) Local Injection once  lithium 450 milliGRAM(s) Oral two times a day  nicotine -  14 mG/24Hr(s) Patch 1 Patch Transdermal daily  piperacillin/tazobactam IVPB.. 3.375 Gram(s) IV Intermittent every 8 hours  senna 2 Tablet(s) Oral at bedtime  vancomycin  IVPB 1250 milliGRAM(s) IV Intermittent every 12 hours    MEDICATIONS  (PRN):  acetaminophen     Tablet .. 650 milliGRAM(s) Oral every 6 hours PRN Temp greater or equal to 38C (100.4F), Mild Pain (1 - 3)  aluminum hydroxide/magnesium hydroxide/simethicone Suspension 30 milliLiter(s) Oral every 4 hours PRN Dyspepsia  benzocaine 15 mG/menthol 3.6 mG Lozenge 1 Lozenge Oral every 4 hours PRN Sore Throat  benzocaine 15 mG/menthol 3.6 mG Lozenge 1 Lozenge Oral four times a day PRN Sore Throat  melatonin 3 milliGRAM(s) Oral at bedtime PRN Insomnia  ondansetron Injectable 4 milliGRAM(s) IV Push every 8 hours PRN Nausea and/or Vomiting  oxycodone    5 mG/acetaminophen 325 mG 1 Tablet(s) Oral every 6 hours PRN Severe Pain (7 - 10)

## 2022-10-20 NOTE — PROGRESS NOTE ADULT - ASSESSMENT
Patient is a 47 year old male with PMH of Schizoaffective disorder, bipolar disorder and tobacco abuse presents with complaints of left sided chest pain pain and shortness of breath and found to have a large left sided loculated effusion.    *Hypoxia due to Large loculated left sided pleural effusion; unclear etiology  S/P Chest Tube placed 10/18  cytology negative for malignant cells; but remarkable for acute inflammation. Repeat pleural fluid studies sent today.  C/W Vancomycin Q12 and Zosyn Q8 empirically  blood cultures x 2 neg to date  CT chest consult appreciated  Decortication tomorrow     *Schizoaffective disorder/Bipolar disorder  hold clonazepam, lithium, clozapine while lethargic, Hold parameters in chart  check clozapine and lithium levels  aripiprazole (IM once monthly 10/5/2022),  psych recs appreciated  improving     *Tobacco abuse  Nicotine patch    *Leukocytosis possibly due to PNA with parapneumonic effusion vs empyema although pleural fluid studies are not suggestive  pleural fluid cultures are negative  urine culture negative  blood cultures negative  leukocytosis improving daily on zosyn, add vanco as above  monitor fever curve; tmax 99  Repeat CT as above    *Pulmonary nodule  continue outpatient surveillance    *Lethargy possibly due to underlying infection  repeat cultures as above; continue zosyn and add vanco  Lactate <1  CTH neg  Hold parameters added for psych meds  improving     *Hypotension  Improvement in BP w/ fluids  low threshold for MICU evaluation    *DVT Prophylaxis - SCDs    Dispo -decortication tomorrow with CT surg

## 2022-10-20 NOTE — PROGRESS NOTE ADULT - SUBJECTIVE AND OBJECTIVE BOX
HPI  Pt is a 48yo M admitted to [facility] for AMS and left pleural effusion  Pt was seen and examined at bedside. Alert and orientated. No more lethargy noted. Pt has no difficulty breathing.     Vital Signs Last 24 Hrs  T(C): 36.6 (20 Oct 2022 08:00), Max: 37.4 (19 Oct 2022 21:00)  T(F): 97.8 (20 Oct 2022 08:00), Max: 99.3 (19 Oct 2022 21:00)  HR: 95 (20 Oct 2022 08:00) (95 - 107)  BP: 114/69 (20 Oct 2022 08:00) (111/71 - 137/85)  BP(mean): 79 (20 Oct 2022 08:00) (79 - 87)  RR: 20 (20 Oct 2022 08:00) (20 - 26)  SpO2: 93% (20 Oct 2022 08:00) (91% - 95%)    Parameters below as of 20 Oct 2022 08:00  Patient On (Oxygen Delivery Method): nasal cannula  O2 Flow (L/min): 3      I&O's Summary    19 Oct 2022 07:01  -  20 Oct 2022 07:00  --------------------------------------------------------  IN: 0 mL / OUT: 2960 mL / NET: -2960 mL    20 Oct 2022 07:01  -  20 Oct 2022 11:30  --------------------------------------------------------  IN: 0 mL / OUT: 400 mL / NET: -400 mL        CAPILLARY BLOOD GLUCOSE          PHYSICAL EXAM:    Constitutional: NAD,   HEENT: PERR, EOMI,   Neck: Soft and supple,   Respiratory: Breath sounds are clear bilaterally, left chest tube noted   Cardiovascular: S1 and S2,   Gastrointestinal: Bowel Sounds present, soft,   Extremities: No peripheral edema  Vascular: 2+ peripheral pulses  Neurological: A/O x 3,     MEDICATIONS:  MEDICATIONS  (STANDING):  atropine 1% Solution 1 Drop(s) Both EYES two times a day  cloZAPine 250 milliGRAM(s) Oral at bedtime  lidocaine 1% Injectable 20 milliLiter(s) Local Injection once  lidocaine 1% Injectable 30 milliLiter(s) Local Injection once  lithium 450 milliGRAM(s) Oral two times a day  nicotine -  14 mG/24Hr(s) Patch 1 Patch Transdermal daily  piperacillin/tazobactam IVPB.. 3.375 Gram(s) IV Intermittent every 8 hours  senna 2 Tablet(s) Oral at bedtime  vancomycin  IVPB 1500 milliGRAM(s) IV Intermittent <User Schedule>      LABS: All Labs Reviewed:                        10.5   12.65 )-----------( 515      ( 20 Oct 2022 05:00 )             33.7     10-20    139  |  99  |  5.0<L>  ----------------------------<  113<H>  3.8   |  30.0<H>  |  0.68    Ca    9.3      20 Oct 2022 05:00  Phos  3.5     10-20  Mg     2.0     10-20      PT/INR - ( 20 Oct 2022 05:00 )   PT: 17.0 sec;   INR: 1.46 ratio         PTT - ( 20 Oct 2022 05:00 )  PTT:33.4 sec      Blood Culture: 10-18 @ 16:06  Organism --  Gram Stain Blood -- Gram Stain --  Specimen Source .Blood Blood-Peripheral  Culture-Blood --    10-18 @ 16:03  Organism --  Gram Stain Blood -- Gram Stain --  Specimen Source .Blood Blood-Peripheral  Culture-Blood --    10-18 @ 09:30  Organism --  Gram Stain Blood -- Gram Stain   No polymorphonuclear leukocytes seen per low power field  No organisms seen per oil power field  Specimen Source Pleural Fl Pleural Fluid  Culture-Blood --        RADIOLOGY/EKG:    DVT PPX:    ADVANCED DIRECTIVE:    DISPOSITION:

## 2022-10-21 ENCOUNTER — RESULT REVIEW (OUTPATIENT)
Age: 47
End: 2022-10-21

## 2022-10-21 ENCOUNTER — APPOINTMENT (OUTPATIENT)
Dept: THORACIC SURGERY | Facility: HOSPITAL | Age: 47
End: 2022-10-21

## 2022-10-21 LAB
ANION GAP SERPL CALC-SCNC: 9 MMOL/L — SIGNIFICANT CHANGE UP (ref 5–17)
APTT BLD: 32.7 SEC — SIGNIFICANT CHANGE UP (ref 27.5–35.5)
BLD GP AB SCN SERPL QL: SIGNIFICANT CHANGE UP
BUN SERPL-MCNC: 4.5 MG/DL — LOW (ref 8–20)
CALCIUM SERPL-MCNC: 9.2 MG/DL — SIGNIFICANT CHANGE UP (ref 8.4–10.5)
CHLORIDE SERPL-SCNC: 99 MMOL/L — SIGNIFICANT CHANGE UP (ref 98–107)
CO2 SERPL-SCNC: 31 MMOL/L — HIGH (ref 22–29)
CREAT SERPL-MCNC: 0.75 MG/DL — SIGNIFICANT CHANGE UP (ref 0.5–1.3)
EGFR: 113 ML/MIN/1.73M2 — SIGNIFICANT CHANGE UP
GLUCOSE SERPL-MCNC: 119 MG/DL — HIGH (ref 70–99)
HCT VFR BLD CALC: 33.8 % — LOW (ref 39–50)
HGB BLD-MCNC: 10.6 G/DL — LOW (ref 13–17)
INR BLD: 1.4 RATIO — HIGH (ref 0.88–1.16)
MCHC RBC-ENTMCNC: 29.5 PG — SIGNIFICANT CHANGE UP (ref 27–34)
MCHC RBC-ENTMCNC: 31.4 GM/DL — LOW (ref 32–36)
MCV RBC AUTO: 94.2 FL — SIGNIFICANT CHANGE UP (ref 80–100)
PLATELET # BLD AUTO: 518 K/UL — HIGH (ref 150–400)
POTASSIUM SERPL-MCNC: 3.8 MMOL/L — SIGNIFICANT CHANGE UP (ref 3.5–5.3)
POTASSIUM SERPL-SCNC: 3.8 MMOL/L — SIGNIFICANT CHANGE UP (ref 3.5–5.3)
PROTHROM AB SERPL-ACNC: 16.3 SEC — HIGH (ref 10.5–13.4)
RBC # BLD: 3.59 M/UL — LOW (ref 4.2–5.8)
RBC # FLD: 13.8 % — SIGNIFICANT CHANGE UP (ref 10.3–14.5)
SODIUM SERPL-SCNC: 139 MMOL/L — SIGNIFICANT CHANGE UP (ref 135–145)
VANCOMYCIN TROUGH SERPL-MCNC: 12.7 UG/ML — SIGNIFICANT CHANGE UP (ref 10–20)
WBC # BLD: 12.66 K/UL — HIGH (ref 3.8–10.5)
WBC # FLD AUTO: 12.66 K/UL — HIGH (ref 3.8–10.5)

## 2022-10-21 PROCEDURE — 32653 THORACOSCOPY REMOV FB/FIBRIN: CPT | Mod: AS

## 2022-10-21 PROCEDURE — 71045 X-RAY EXAM CHEST 1 VIEW: CPT | Mod: 26

## 2022-10-21 PROCEDURE — 99233 SBSQ HOSP IP/OBS HIGH 50: CPT

## 2022-10-21 PROCEDURE — 32653 THORACOSCOPY REMOV FB/FIBRIN: CPT

## 2022-10-21 PROCEDURE — 88305 TISSUE EXAM BY PATHOLOGIST: CPT | Mod: 26

## 2022-10-21 PROCEDURE — 32652 THORACOSCOPY REM TOTL CORTEX: CPT

## 2022-10-21 PROCEDURE — 32652 THORACOSCOPY REM TOTL CORTEX: CPT | Mod: AS

## 2022-10-21 DEVICE — CHEST DRAIN THORACIC ARGYLE PVC 24FR RIGHT ANGLE: Type: IMPLANTABLE DEVICE | Site: LEFT | Status: FUNCTIONAL

## 2022-10-21 DEVICE — CHEST DRAIN THORACIC ARGYLE PVC 24FR STRAIGHT: Type: IMPLANTABLE DEVICE | Site: LEFT | Status: FUNCTIONAL

## 2022-10-21 RX ORDER — LITHIUM CARBONATE 300 MG/1
450 TABLET, EXTENDED RELEASE ORAL
Refills: 0 | Status: DISCONTINUED | OUTPATIENT
Start: 2022-10-21 | End: 2022-10-21

## 2022-10-21 RX ORDER — SENNA PLUS 8.6 MG/1
2 TABLET ORAL AT BEDTIME
Refills: 0 | Status: DISCONTINUED | OUTPATIENT
Start: 2022-10-21 | End: 2022-10-26

## 2022-10-21 RX ORDER — NICOTINE POLACRILEX 2 MG
1 GUM BUCCAL DAILY
Refills: 0 | Status: DISCONTINUED | OUTPATIENT
Start: 2022-10-21 | End: 2022-10-26

## 2022-10-21 RX ORDER — FENTANYL CITRATE 50 UG/ML
10 INJECTION INTRAVENOUS
Refills: 0 | Status: DISCONTINUED | OUTPATIENT
Start: 2022-10-21 | End: 2022-10-23

## 2022-10-21 RX ORDER — CLOZAPINE 150 MG/1
250 TABLET, ORALLY DISINTEGRATING ORAL AT BEDTIME
Refills: 0 | Status: DISCONTINUED | OUTPATIENT
Start: 2022-10-21 | End: 2022-10-21

## 2022-10-21 RX ORDER — ACETAMINOPHEN 500 MG
650 TABLET ORAL EVERY 6 HOURS
Refills: 0 | Status: DISCONTINUED | OUTPATIENT
Start: 2022-10-21 | End: 2022-10-21

## 2022-10-21 RX ORDER — NICOTINE POLACRILEX 2 MG
1 GUM BUCCAL DAILY
Refills: 0 | Status: DISCONTINUED | OUTPATIENT
Start: 2022-10-21 | End: 2022-10-21

## 2022-10-21 RX ORDER — VANCOMYCIN HCL 1 G
1500 VIAL (EA) INTRAVENOUS
Refills: 0 | Status: DISCONTINUED | OUTPATIENT
Start: 2022-10-22 | End: 2022-10-24

## 2022-10-21 RX ORDER — FENTANYL CITRATE 50 UG/ML
25 INJECTION INTRAVENOUS
Refills: 0 | Status: DISCONTINUED | OUTPATIENT
Start: 2022-10-21 | End: 2022-10-21

## 2022-10-21 RX ORDER — ATROPINE SULFATE 1 %
1 DROPS OPHTHALMIC (EYE)
Refills: 0 | Status: DISCONTINUED | OUTPATIENT
Start: 2022-10-21 | End: 2022-10-21

## 2022-10-21 RX ORDER — SENNA PLUS 8.6 MG/1
2 TABLET ORAL AT BEDTIME
Refills: 0 | Status: DISCONTINUED | OUTPATIENT
Start: 2022-10-21 | End: 2022-10-21

## 2022-10-21 RX ORDER — LIDOCAINE HCL 20 MG/ML
30 VIAL (ML) INJECTION ONCE
Refills: 0 | Status: DISCONTINUED | OUTPATIENT
Start: 2022-10-21 | End: 2022-10-21

## 2022-10-21 RX ORDER — ONDANSETRON 8 MG/1
4 TABLET, FILM COATED ORAL EVERY 8 HOURS
Refills: 0 | Status: DISCONTINUED | OUTPATIENT
Start: 2022-10-21 | End: 2022-10-26

## 2022-10-21 RX ORDER — BENZOCAINE AND MENTHOL 5; 1 G/100ML; G/100ML
1 LIQUID ORAL EVERY 4 HOURS
Refills: 0 | Status: DISCONTINUED | OUTPATIENT
Start: 2022-10-21 | End: 2022-10-26

## 2022-10-21 RX ORDER — CLOZAPINE 150 MG/1
250 TABLET, ORALLY DISINTEGRATING ORAL AT BEDTIME
Refills: 0 | Status: DISCONTINUED | OUTPATIENT
Start: 2022-10-21 | End: 2022-10-26

## 2022-10-21 RX ORDER — BENZOCAINE AND MENTHOL 5; 1 G/100ML; G/100ML
1 LIQUID ORAL
Refills: 0 | Status: DISCONTINUED | OUTPATIENT
Start: 2022-10-21 | End: 2022-10-21

## 2022-10-21 RX ORDER — SODIUM CHLORIDE 9 MG/ML
1000 INJECTION, SOLUTION INTRAVENOUS
Refills: 0 | Status: DISCONTINUED | OUTPATIENT
Start: 2022-10-21 | End: 2022-10-21

## 2022-10-21 RX ORDER — IPRATROPIUM/ALBUTEROL SULFATE 18-103MCG
3 AEROSOL WITH ADAPTER (GRAM) INHALATION ONCE
Refills: 0 | Status: COMPLETED | OUTPATIENT
Start: 2022-10-21 | End: 2022-10-21

## 2022-10-21 RX ORDER — ENOXAPARIN SODIUM 100 MG/ML
40 INJECTION SUBCUTANEOUS EVERY 24 HOURS
Refills: 0 | Status: DISCONTINUED | OUTPATIENT
Start: 2022-10-22 | End: 2022-10-26

## 2022-10-21 RX ORDER — SODIUM CHLORIDE 9 MG/ML
1000 INJECTION, SOLUTION INTRAVENOUS
Refills: 0 | Status: DISCONTINUED | OUTPATIENT
Start: 2022-10-21 | End: 2022-10-23

## 2022-10-21 RX ORDER — ATROPINE SULFATE 1 %
1 DROPS OPHTHALMIC (EYE)
Refills: 0 | Status: DISCONTINUED | OUTPATIENT
Start: 2022-10-21 | End: 2022-10-26

## 2022-10-21 RX ORDER — LITHIUM CARBONATE 300 MG/1
450 TABLET, EXTENDED RELEASE ORAL
Refills: 0 | Status: DISCONTINUED | OUTPATIENT
Start: 2022-10-21 | End: 2022-10-26

## 2022-10-21 RX ORDER — OXYCODONE AND ACETAMINOPHEN 5; 325 MG/1; MG/1
1 TABLET ORAL EVERY 6 HOURS
Refills: 0 | Status: DISCONTINUED | OUTPATIENT
Start: 2022-10-21 | End: 2022-10-21

## 2022-10-21 RX ORDER — ACETAMINOPHEN 500 MG
650 TABLET ORAL EVERY 6 HOURS
Refills: 0 | Status: DISCONTINUED | OUTPATIENT
Start: 2022-10-21 | End: 2022-10-26

## 2022-10-21 RX ORDER — BENZOCAINE AND MENTHOL 5; 1 G/100ML; G/100ML
1 LIQUID ORAL EVERY 4 HOURS
Refills: 0 | Status: DISCONTINUED | OUTPATIENT
Start: 2022-10-21 | End: 2022-10-21

## 2022-10-21 RX ORDER — LANOLIN ALCOHOL/MO/W.PET/CERES
3 CREAM (GRAM) TOPICAL AT BEDTIME
Refills: 0 | Status: DISCONTINUED | OUTPATIENT
Start: 2022-10-21 | End: 2022-10-26

## 2022-10-21 RX ORDER — PIPERACILLIN AND TAZOBACTAM 4; .5 G/20ML; G/20ML
3.38 INJECTION, POWDER, LYOPHILIZED, FOR SOLUTION INTRAVENOUS EVERY 8 HOURS
Refills: 0 | Status: DISCONTINUED | OUTPATIENT
Start: 2022-10-22 | End: 2022-10-26

## 2022-10-21 RX ORDER — LANOLIN ALCOHOL/MO/W.PET/CERES
3 CREAM (GRAM) TOPICAL AT BEDTIME
Refills: 0 | Status: DISCONTINUED | OUTPATIENT
Start: 2022-10-21 | End: 2022-10-21

## 2022-10-21 RX ORDER — ONDANSETRON 8 MG/1
4 TABLET, FILM COATED ORAL EVERY 8 HOURS
Refills: 0 | Status: DISCONTINUED | OUTPATIENT
Start: 2022-10-21 | End: 2022-10-21

## 2022-10-21 RX ORDER — OXYCODONE AND ACETAMINOPHEN 5; 325 MG/1; MG/1
1 TABLET ORAL EVERY 6 HOURS
Refills: 0 | Status: DISCONTINUED | OUTPATIENT
Start: 2022-10-21 | End: 2022-10-26

## 2022-10-21 RX ORDER — BUPIVACAINE 13.3 MG/ML
20 INJECTION, SUSPENSION, LIPOSOMAL INFILTRATION ONCE
Refills: 0 | Status: DISCONTINUED | OUTPATIENT
Start: 2022-10-21 | End: 2022-10-21

## 2022-10-21 RX ADMIN — Medication 300 MILLIGRAM(S): at 10:23

## 2022-10-21 RX ADMIN — PIPERACILLIN AND TAZOBACTAM 25 GRAM(S): 4; .5 INJECTION, POWDER, LYOPHILIZED, FOR SOLUTION INTRAVENOUS at 05:37

## 2022-10-21 RX ADMIN — Medication 1 DROP(S): at 05:36

## 2022-10-21 RX ADMIN — Medication 3 MILLILITER(S): at 14:25

## 2022-10-21 RX ADMIN — LITHIUM CARBONATE 450 MILLIGRAM(S): 300 TABLET, EXTENDED RELEASE ORAL at 17:53

## 2022-10-21 RX ADMIN — CLOZAPINE 250 MILLIGRAM(S): 150 TABLET, ORALLY DISINTEGRATING ORAL at 23:04

## 2022-10-21 RX ADMIN — LITHIUM CARBONATE 450 MILLIGRAM(S): 300 TABLET, EXTENDED RELEASE ORAL at 05:39

## 2022-10-21 NOTE — BH CONSULTATION LIAISON PROGRESS NOTE - PRN MEDS
acetaminophen     Tablet .. 650 milliGRAM(s) Oral every 6 hours PRN Temp greater or equal to 38C (100.4F), Mild Pain (1 - 3)  aluminum hydroxide/magnesium hydroxide/simethicone Suspension 30 milliLiter(s) Oral every 4 hours PRN Dyspepsia  benzocaine 15 mG/menthol 3.6 mG Lozenge 1 Lozenge Oral four times a day PRN Sore Throat  benzocaine 15 mG/menthol 3.6 mG Lozenge 1 Lozenge Oral every 4 hours PRN Sore Throat  melatonin 3 milliGRAM(s) Oral at bedtime PRN Insomnia  ondansetron Injectable 4 milliGRAM(s) IV Push every 8 hours PRN Nausea and/or Vomiting  oxycodone    5 mG/acetaminophen 325 mG 1 Tablet(s) Oral every 6 hours PRN Severe Pain (7 - 10)
ondansetron Injectable 4 milliGRAM(s) IV Push every 8 hours PRN Nausea and/or Vomiting  oxycodone    5 mG/acetaminophen 325 mG 1 Tablet(s) Oral every 6 hours PRN Severe Pain (7 - 10)  benzocaine 15 mG/menthol 3.6 mG Lozenge 1 Lozenge Oral every 4 hours PRN Sore Throat  aluminum hydroxide/magnesium hydroxide/simethicone Suspension 30 milliLiter(s) Oral every 4 hours PRN Dyspepsia

## 2022-10-21 NOTE — PROGRESS NOTE ADULT - SUBJECTIVE AND OBJECTIVE BOX
HPI  Pt is a 48yo M admitted to [facility] for AMS and left pleural effusion  Pt was seen and examined at bedside. Alert and orientated. Pt denies of any discomfort. States he is a little scared prior to surgery today. Overnight desat to low 80s noted.     Vital Signs Last 24 Hrs  T(C): 36.9 (21 Oct 2022 08:00), Max: 36.9 (21 Oct 2022 04:00)  T(F): 98.5 (21 Oct 2022 08:00), Max: 98.5 (21 Oct 2022 08:00)  HR: 96 (21 Oct 2022 08:08) (90 - 100)  BP: 107/48 (21 Oct 2022 08:08) (107/48 - 123/67)  BP(mean): 60 (21 Oct 2022 08:08) (60 - 83)  RR: 21 (21 Oct 2022 08:08) (19 - 22)  SpO2: 90% (21 Oct 2022 08:08) (90% - 100%)    Parameters below as of 21 Oct 2022 04:05  Patient On (Oxygen Delivery Method): nasal cannula  O2 Flow (L/min): 2      I&O's Summary    20 Oct 2022 07:01  -  21 Oct 2022 07:00  --------------------------------------------------------  IN: 125 mL / OUT: 1175 mL / NET: -1050 mL        CAPILLARY BLOOD GLUCOSE          PHYSICAL EXAM:    Constitutional: NAD,   HEENT: PERR, EOMI,   Neck: Soft and supple,   Respiratory: Breath sounds are clear bilaterally, left chest tube noted   Cardiovascular: S1 and S2,   Gastrointestinal: Bowel Sounds present, soft,   Extremities: No peripheral edema  Vascular: 2+ peripheral pulses  Neurological: A/O x 3,     MEDICATIONS:  MEDICATIONS  (STANDING):  atropine 1% Solution 1 Drop(s) Both EYES two times a day  BUpivacaine liposome 1.3% Injectable 20 milliLiter(s) Local Injection once  cloZAPine 250 milliGRAM(s) Oral at bedtime  lidocaine 1% Injectable 20 milliLiter(s) Local Injection once  lidocaine 1% Injectable 30 milliLiter(s) Local Injection once  lithium 450 milliGRAM(s) Oral two times a day  nicotine -  14 mG/24Hr(s) Patch 1 Patch Transdermal daily  piperacillin/tazobactam IVPB.. 3.375 Gram(s) IV Intermittent every 8 hours  senna 2 Tablet(s) Oral at bedtime  vancomycin  IVPB 1500 milliGRAM(s) IV Intermittent <User Schedule>      LABS: All Labs Reviewed:                        10.6   12.66 )-----------( 518      ( 21 Oct 2022 04:55 )             33.8     10-21    139  |  99  |  4.5<L>  ----------------------------<  119<H>  3.8   |  31.0<H>  |  0.75    Ca    9.2      21 Oct 2022 04:55  Phos  3.5     10-20  Mg     2.0     10-20      PT/INR - ( 21 Oct 2022 04:55 )   PT: 16.3 sec;   INR: 1.40 ratio         PTT - ( 21 Oct 2022 04:55 )  PTT:32.7 sec      Blood Culture: 10-18 @ 16:06  Organism --  Gram Stain Blood -- Gram Stain --  Specimen Source .Blood Blood-Peripheral  Culture-Blood --    10-18 @ 16:03  Organism --  Gram Stain Blood -- Gram Stain --  Specimen Source .Blood Blood-Peripheral  Culture-Blood --    10-18 @ 09:30  Organism --  Gram Stain Blood -- Gram Stain   No polymorphonuclear leukocytes seen per low power field  No organisms seen per oil power field  Specimen Source Pleural Fl Pleural Fluid  Culture-Blood --        RADIOLOGY/EKG:    DVT PPX:    ADVANCED DIRECTIVE:    DISPOSITION:

## 2022-10-21 NOTE — BH CONSULTATION LIAISON PROGRESS NOTE - ADDITIONAL PSYCHIATRIC MEDICATIONS
n/a  Unable to assess, patient large stroke, does not respond to verbal stimulation no family in this [place].

## 2022-10-21 NOTE — ASU PREOP CHECKLIST - TAMPON REMOVED
2022     Update per phone conversation.      EMPLOYEE INFORMATION: EMPLOYER INFORMATION:   NAME: Jose Garcia [employer] ( [employer])    : 1969 [phone]     DATE OF INJURY/EVENT: 1/10/2022            [facility]    Treating Provider: Eloisa Trinidad MD  Time In:   Time Out:  11:19 AM      DIAGNOSIS: No diagnosis found.  STATUS: This injury is determined to be WORK RELATED.    RETURN TO WORK:Employee may return to work with restrictions.   Return Date: 2022            RESTRICTIONS: Restrictions are to be followed at work and at home.  Restrictions are in effect until next follow-up visit.  Continue working 8 hours a day maximum shift.  This will be in effect until his follow-up visit with me.   Patient reports increased soreness with increased work hours.  Will therefore keep him on the limited hours.  Will get a report from his chiropractor prior to our next visit.  TREATMENT PLAN:   Medications for this injury/condition: Ibuprofen 200 mg 2 tablets with tylenol 500 mg 1 tablet every 6-8 hours with food as needed   Referral/Consult: None  Diagnostic Testing: None       Instructions: Home exercises     NEXT RETURN VISIT: 2022 at 4:00PM with Dr. Trinidad  Thank you for the privilege of providing medical care for this injury/condition.  If there are any questions, please call the occupational health clinic at Dept: [phone].      Electronically signed on 2022 at 11:19 AM by:   Eloisa Trinidad MD   [facility]       n/a

## 2022-10-21 NOTE — BH CONSULTATION LIAISON PROGRESS NOTE - NSBHCONSULTPRIMARYDISCUSSYES_PSY_A_CORE FT
Continue to hold psych medication until pt is at baseline  Continue to hold psych medication until pt is at baseline He remains lethargic

## 2022-10-21 NOTE — BRIEF OPERATIVE NOTE - NSICDXBRIEFPROCEDURE_GEN_ALL_CORE_FT
PROCEDURES:  Bronchoscopy with decortication of left lung using video-assisted thoracoscopic surgery (VATS) 21-Oct-2022 14:22:44 bronchoscopy, left vats, total decortication, Intercostal nerve block Tg Krishna

## 2022-10-21 NOTE — BH CONSULTATION LIAISON PROGRESS NOTE - NSBHCHARTREVIEWLAB_PSY_A_CORE FT
10.5   12.65 )-----------( 515      ( 20 Oct 2022 05:00 )             33.7     10-20    139  |  99  |  5.0<L>  ----------------------------<  113<H>  3.8   |  30.0<H>  |  0.68    Ca    9.3      20 Oct 2022 05:00  Phos  3.5     10-20  Mg     2.0     10-20        PT/INR - ( 20 Oct 2022 05:00 )   PT: 17.0 sec;   INR: 1.46 ratio         PTT - ( 20 Oct 2022 05:00 )  PTT:33.4 sec  
                      10.6   12.66 )-----------( 518      ( 21 Oct 2022 04:55 )             33.8     10-21    139  |  99  |  4.5<L>  ----------------------------<  119<H>  3.8   |  31.0<H>  |  0.75    Ca    9.2      21 Oct 2022 04:55  Phos  3.5     10-20  Mg     2.0     10-20        PT/INR - ( 21 Oct 2022 04:55 )   PT: 16.3 sec;   INR: 1.40 ratio         PTT - ( 21 Oct 2022 04:55 )  PTT:32.7 sec

## 2022-10-21 NOTE — ASU PREOP CHECKLIST - TEMPERATURE IN FAHRENHEIT (DEGREES F)
Results (positive covid-19) given to patient spouse. Discussed quarantine and all questions answered.    
98

## 2022-10-21 NOTE — PROGRESS NOTE ADULT - ASSESSMENT
Patient is a 47 year old male with PMH of Schizoaffective disorder, bipolar disorder and tobacco abuse presents with complaints of left sided chest pain pain and shortness of breath and found to have a large left sided loculated effusion.    *Hypoxia due to Large loculated left sided pleural effusion; unclear etiology  S/P Chest Tube placed 10/18  cytology negative for malignant cells; but remarkable for acute inflammation. Repeat pleural fluid studies sent today.  C/W Vancomycin Q12 and Zosyn Q8 empirically  blood cultures x 2 neg to date  CT chest consult appreciated  Decortication today     *Schizoaffective disorder/Bipolar disorder  hold clonazepam, lithium, clozapine while lethargic, Hold parameters in chart  aripiprazole (IM once monthly 10/5/2022),  psych recs appreciated  improving   pending psy recommendation to restart meds     *Tobacco abuse  Nicotine patch    *Leukocytosis possibly due to PNA with parapneumonic effusion vs empyema although pleural fluid studies are not suggestive  pleural fluid cultures are negative  urine culture negative  blood cultures negative  leukocytosis improving daily on zosyn, add vanco as above  monitor fever curve; tmax 99  Repeat CT as above    *Pulmonary nodule  continue outpatient surveillance    *Lethargy possibly due to underlying infection  repeat cultures as above; continue zosyn and add vanco  Lactate <1  CTH neg  Hold parameters added for psych meds  improving     *Hypotension  Improvement  ld for MICU evaluation    *DVT Prophylaxis - SCDs    Dispo -decortication today with CT surg, pt is still acute

## 2022-10-22 LAB
ANION GAP SERPL CALC-SCNC: 7 MMOL/L — SIGNIFICANT CHANGE UP (ref 5–17)
BASOPHILS # BLD AUTO: 0.06 K/UL — SIGNIFICANT CHANGE UP (ref 0–0.2)
BASOPHILS NFR BLD AUTO: 0.5 % — SIGNIFICANT CHANGE UP (ref 0–2)
BUN SERPL-MCNC: 6.7 MG/DL — LOW (ref 8–20)
CALCIUM SERPL-MCNC: 9 MG/DL — SIGNIFICANT CHANGE UP (ref 8.4–10.5)
CHLORIDE SERPL-SCNC: 97 MMOL/L — LOW (ref 98–107)
CO2 SERPL-SCNC: 32 MMOL/L — HIGH (ref 22–29)
CREAT SERPL-MCNC: 0.94 MG/DL — SIGNIFICANT CHANGE UP (ref 0.5–1.3)
EGFR: 102 ML/MIN/1.73M2 — SIGNIFICANT CHANGE UP
EOSINOPHIL # BLD AUTO: 0.22 K/UL — SIGNIFICANT CHANGE UP (ref 0–0.5)
EOSINOPHIL NFR BLD AUTO: 1.7 % — SIGNIFICANT CHANGE UP (ref 0–6)
GLUCOSE SERPL-MCNC: 120 MG/DL — HIGH (ref 70–99)
GRAM STN FLD: SIGNIFICANT CHANGE UP
GRAM STN FLD: SIGNIFICANT CHANGE UP
HCT VFR BLD CALC: 34.6 % — LOW (ref 39–50)
HGB BLD-MCNC: 10.9 G/DL — LOW (ref 13–17)
IMM GRANULOCYTES NFR BLD AUTO: 0.7 % — SIGNIFICANT CHANGE UP (ref 0–0.9)
LYMPHOCYTES # BLD AUTO: 1.84 K/UL — SIGNIFICANT CHANGE UP (ref 1–3.3)
LYMPHOCYTES # BLD AUTO: 14 % — SIGNIFICANT CHANGE UP (ref 13–44)
MAGNESIUM SERPL-MCNC: 2.1 MG/DL — SIGNIFICANT CHANGE UP (ref 1.6–2.6)
MCHC RBC-ENTMCNC: 29.2 PG — SIGNIFICANT CHANGE UP (ref 27–34)
MCHC RBC-ENTMCNC: 31.5 GM/DL — LOW (ref 32–36)
MCV RBC AUTO: 92.8 FL — SIGNIFICANT CHANGE UP (ref 80–100)
MONOCYTES # BLD AUTO: 1.16 K/UL — HIGH (ref 0–0.9)
MONOCYTES NFR BLD AUTO: 8.8 % — SIGNIFICANT CHANGE UP (ref 2–14)
NEUTROPHILS # BLD AUTO: 9.8 K/UL — HIGH (ref 1.8–7.4)
NEUTROPHILS NFR BLD AUTO: 74.3 % — SIGNIFICANT CHANGE UP (ref 43–77)
NIGHT BLUE STAIN TISS: SIGNIFICANT CHANGE UP
NIGHT BLUE STAIN TISS: SIGNIFICANT CHANGE UP
PLATELET # BLD AUTO: 520 K/UL — HIGH (ref 150–400)
POTASSIUM SERPL-MCNC: 4.1 MMOL/L — SIGNIFICANT CHANGE UP (ref 3.5–5.3)
POTASSIUM SERPL-SCNC: 4.1 MMOL/L — SIGNIFICANT CHANGE UP (ref 3.5–5.3)
RBC # BLD: 3.73 M/UL — LOW (ref 4.2–5.8)
RBC # FLD: 13.7 % — SIGNIFICANT CHANGE UP (ref 10.3–14.5)
SODIUM SERPL-SCNC: 136 MMOL/L — SIGNIFICANT CHANGE UP (ref 135–145)
SPECIMEN SOURCE: SIGNIFICANT CHANGE UP
WBC # BLD: 13.17 K/UL — HIGH (ref 3.8–10.5)
WBC # FLD AUTO: 13.17 K/UL — HIGH (ref 3.8–10.5)

## 2022-10-22 PROCEDURE — 71045 X-RAY EXAM CHEST 1 VIEW: CPT | Mod: 26

## 2022-10-22 PROCEDURE — 99232 SBSQ HOSP IP/OBS MODERATE 35: CPT

## 2022-10-22 RX ADMIN — Medication 300 MILLIGRAM(S): at 11:36

## 2022-10-22 RX ADMIN — ENOXAPARIN SODIUM 40 MILLIGRAM(S): 100 INJECTION SUBCUTANEOUS at 22:52

## 2022-10-22 RX ADMIN — LITHIUM CARBONATE 450 MILLIGRAM(S): 300 TABLET, EXTENDED RELEASE ORAL at 06:18

## 2022-10-22 RX ADMIN — PIPERACILLIN AND TAZOBACTAM 25 GRAM(S): 4; .5 INJECTION, POWDER, LYOPHILIZED, FOR SOLUTION INTRAVENOUS at 23:25

## 2022-10-22 RX ADMIN — Medication 1 PATCH: at 11:37

## 2022-10-22 RX ADMIN — Medication 1 DROP(S): at 07:28

## 2022-10-22 RX ADMIN — PIPERACILLIN AND TAZOBACTAM 25 GRAM(S): 4; .5 INJECTION, POWDER, LYOPHILIZED, FOR SOLUTION INTRAVENOUS at 06:18

## 2022-10-22 RX ADMIN — Medication 1 DROP(S): at 17:24

## 2022-10-22 RX ADMIN — SENNA PLUS 2 TABLET(S): 8.6 TABLET ORAL at 22:52

## 2022-10-22 RX ADMIN — CLOZAPINE 250 MILLIGRAM(S): 150 TABLET, ORALLY DISINTEGRATING ORAL at 22:52

## 2022-10-22 RX ADMIN — PIPERACILLIN AND TAZOBACTAM 25 GRAM(S): 4; .5 INJECTION, POWDER, LYOPHILIZED, FOR SOLUTION INTRAVENOUS at 13:30

## 2022-10-22 RX ADMIN — LITHIUM CARBONATE 450 MILLIGRAM(S): 300 TABLET, EXTENDED RELEASE ORAL at 17:22

## 2022-10-22 NOTE — PROGRESS NOTE ADULT - ASSESSMENT
47 year old male patient, current [place] resident, admitted to medicine with c/o left sided abdominal pain/chest discomfort X 4 days. +Dry cough. Patient found with Leukocytosis (WBC >20), low grade fever, and CT chest showing Large Left Loculated Pleural Effusion. Patient in no acute respiratory distress, with SpO2 stable on room air.  10/12 left percutaneous chest tube placed  10/16 CT removed  10/17 Reconsulted for possible pleural effusion reaccumulation increased 02 requirement  10/18 Left chest tube placed without complication     10/21 L VATS/ Empyema  10/22 Maintain CT x 2 suction

## 2022-10-22 NOTE — PROGRESS NOTE ADULT - SUBJECTIVE AND OBJECTIVE BOX
Subjective: "Good morning"  Sleeping in bed this am      V/S  T(C): 36.8 (10-22-22 @ 07:47), Max: 37.8 (10-21-22 @ 22:00)  HR: 86 (10-22-22 @ 07:47) (86 - 112)  BP: 120/77 (10-22-22 @ 07:47) (111/66 - 144/83)  RR: 18 (10-22-22 @ 07:47) (14 - 20)  SpO2: 93% (10-22-22 @ 07:47) (91% - 100%)      CHEST TUBE: R CT  X 2 suction       AIR LEAKS:  [ ] YES [x ] NO    I&O's Detail    21 Oct 2022 07:01  -  22 Oct 2022 07:00  --------------------------------------------------------  IN:    Oral Fluid: 240 mL  Total IN: 240 mL    OUT:    Chest Tube (mL): 75 mL    Chest Tube (mL): 175 mL    Voided (mL): 1350 mL  Total OUT: 1600 mL    Total NET: -1360 mL      22 Oct 2022 07:01  -  22 Oct 2022 10:11  --------------------------------------------------------  IN:  Total IN: 0 mL    OUT:    Chest Tube (mL): 25 mL    Chest Tube (mL): 10 mL  Total OUT: 35 mL    Total NET: -35 mL          10-21-22 @ 07:01  -  10-22-22 @ 07:00  --------------------------------------------------------  IN: 240 mL / OUT: 1600 mL / NET: -1360 mL    10-22-22 @ 07:01  -  10-22-22 @ 10:11  --------------------------------------------------------  IN: 0 mL / OUT: 35 mL / NET: -35 mL      MEDICATIONS  (STANDING):  atropine 1% Solution 1 Drop(s) Both EYES two times a day  cloZAPine 250 milliGRAM(s) Oral at bedtime  enoxaparin Injectable 40 milliGRAM(s) SubCutaneous every 24 hours  fentaNYL  PCA (50 MICROgram(s)/mL) Rescue Clinician Bolus 10 MICROGram(s) IV Push every 30 minutes  lactated ringers. 1000 milliLiter(s) (75 mL/Hr) IV Continuous <Continuous>  lidocaine 1% Injectable 20 milliLiter(s) Local Injection once  lithium 450 milliGRAM(s) Oral two times a day  nicotine -  14 mG/24Hr(s) Patch 1 Patch Transdermal daily  piperacillin/tazobactam IVPB.. 3.375 Gram(s) IV Intermittent every 8 hours  senna 2 Tablet(s) Oral at bedtime  vancomycin  IVPB 1500 milliGRAM(s) IV Intermittent <User Schedule>      10-22    136  |  97<L>  |  6.7<L>  ----------------------------<  120<H>  4.1   |  32.0<H>  |  0.94    Ca    9.0      22 Oct 2022 06:50  Mg     2.1     10-22                                 10.9   13.17 )-----------( 520      ( 22 Oct 2022 06:50 )             34.6        PT/INR - ( 21 Oct 2022 04:55 )   PT: 16.3 sec;   INR: 1.40 ratio         PTT - ( 21 Oct 2022 04:55 )  PTT:32.7 sec                     CXR:      Physical Exam:    Gen: A&Ox3    Pulm:  Decreased left side     CV:  S1S2, RRR, no m/r/g    Abd: +BS, soft, NT, ND    Ext:  +DP b/l, no c/c/e    Incision: 2 CT  left  to suction Dressing changed this am d/t serosang drainage  No air leak                PAST MEDICAL & SURGICAL HISTORY:  Schizoaffective disorder      Bipolar disorder      History of tobacco abuse      S/P tonsillectomy

## 2022-10-22 NOTE — BH CONSULTATION LIAISON PROGRESS NOTE - NSBHCHARTREVIEWVS_PSY_A_CORE FT
Vital Signs Last 24 Hrs  T(C): 36.9 (22 Oct 2022 11:36), Max: 37.8 (21 Oct 2022 22:00)  T(F): 98.4 (22 Oct 2022 11:36), Max: 100 (21 Oct 2022 22:00)  HR: 76 (22 Oct 2022 11:36) (76 - 112)  BP: 117/73 (22 Oct 2022 11:36) (111/66 - 144/83)  BP(mean): 89 (21 Oct 2022 22:00) (76 - 89)  RR: 17 (22 Oct 2022 11:36) (14 - 20)  SpO2: 92% (22 Oct 2022 11:36) (91% - 100%)    Parameters below as of 22 Oct 2022 11:36  Patient On (Oxygen Delivery Method): nasal cannula  O2 Flow (L/min): 2  
Vital Signs Last 24 Hrs  T(C): 37.1 (20 Oct 2022 04:00), Max: 37.4 (19 Oct 2022 21:00)  T(F): 98.7 (20 Oct 2022 04:00), Max: 99.3 (19 Oct 2022 21:00)  HR: 96 (20 Oct 2022 04:00) (96 - 107)  BP: 111/71 (20 Oct 2022 04:00) (111/71 - 137/85)  BP(mean): 80 (20 Oct 2022 04:00) (80 - 87)  RR: 22 (20 Oct 2022 04:00) (22 - 26)  SpO2: 94% (20 Oct 2022 04:00) (91% - 95%)    Parameters below as of 20 Oct 2022 04:00  Patient On (Oxygen Delivery Method): nasal cannula  O2 Flow (L/min): 3  
Vital Signs Last 24 Hrs  T(C): 36.9 (21 Oct 2022 08:00), Max: 36.9 (21 Oct 2022 04:00)  T(F): 98.5 (21 Oct 2022 08:00), Max: 98.5 (21 Oct 2022 08:00)  HR: 96 (21 Oct 2022 08:08) (90 - 100)  BP: 107/48 (21 Oct 2022 08:08) (107/48 - 123/67)  BP(mean): 60 (21 Oct 2022 08:08) (60 - 83)  RR: 20 (21 Oct 2022 04:05) (19 - 22)  SpO2: 94% (21 Oct 2022 04:05) (93% - 100%)    Parameters below as of 21 Oct 2022 04:05  Patient On (Oxygen Delivery Method): nasal cannula  O2 Flow (L/min): 2

## 2022-10-22 NOTE — PROGRESS NOTE ADULT - PROBLEM SELECTOR PLAN 1
S/P L VATS  -CT x 2   on suction  Continuous SpO2 monitoring.  Maintain SpO2 >92%. Supplement with O2 therapy PRN.   Encourage cough and deep breathing/IS use   Daily CXR  Plan discussed with Thoracic Surgery Attending/Team in AM rounds

## 2022-10-22 NOTE — BH CONSULTATION LIAISON PROGRESS NOTE - NSBHMSESPEECH_PSY_A_CORE
Normal volume, rate, productivity, spontaneity and articulation
Abnormal as indicated, otherwise normal...
Abnormal as indicated, otherwise normal...

## 2022-10-22 NOTE — BH CONSULTATION LIAISON PROGRESS NOTE - NSBHMSEGROOM_PSY_A_CORE
----- Message from [facility] sent at 12/27/2021  8:14 AM EST -----  Subject: Refill Request    QUESTIONS  Name of Medication? traMADol (ULTRAM) 50 MG tablet  Patient-reported dosage and instructions? Take 1 tablet by mouth every 6   hours as needed for Pain for up to 30 days. How many days do you have left? 0  Preferred Pharmacy? [id number] phone number (if available)? [phone]  ---------------------------------------------------------------------------  --------------,  Name of Medication? gabapentin (NEURONTIN) 300 MG capsule  Patient-reported dosage and instructions? Take 1 capsule by mouth 3 times   daily for 30 days. How many days do you have left? 0  Preferred Pharmacy? [id number] phone number (if available)? [phone]  ---------------------------------------------------------------------------  --------------  CALL BACK INFO  What is the best way for the office to contact you? OK to leave message on   voicemail  Preferred Call Back Phone Number?  [phone]
Refill too soon for both, patient notified by .
Fair

## 2022-10-22 NOTE — BH CONSULTATION LIAISON PROGRESS NOTE - NSBHASSESSMENTFT_PSY_ALL_CORE
Pt is a 47 yr old male pphx of schizoaffective disorder, admitted inpatient for left sided pleural effusion. Pt states that his breathing is "much better" today. Due to undergo flexible bronchoscopy, left VATS, and decortication with Thoracic surgery today. Continue to hold psych medication until pt is at baseline 
medically improving  appears mentally stable  no new recommendations
Will continue to monitor pt and restart psych meds once at baseline

## 2022-10-22 NOTE — BH CONSULTATION LIAISON PROGRESS NOTE - NSICDXBHPRIMARYDX_PSY_ALL_CORE
Schizoaffective disorder, depressive type   F25.1  
Pleural effusion, left   J90  
Pleural effusion, left   J90

## 2022-10-22 NOTE — BH CONSULTATION LIAISON PROGRESS NOTE - NSBHFUPINTERVALHXFT_PSY_A_CORE
seated in chair Alert but very fatigued appearing Pale expressed no Major concerns just "I hope my family is well"  denies any self injurious or violent urges  
Pt is laying in bed, arousable to verbal stimuli. Pt states that his breathing is "much better" today. Pt is NPO. Pt due to undergo flexible bronchoscopy, left VATS, and decortication with thoracic surgery today. Repeat cultures were sent yesterday and pending. Pt on vancomycin IV 1500mg. 
Pt laying in bed, very lethargic. Pt slightly awakes to verbal stimuli. Pt currently has chest tube in draining left pleural effusion.

## 2022-10-22 NOTE — BH CONSULTATION LIAISON PROGRESS NOTE - CURRENT MEDICATION
MEDICATIONS  (STANDING):  atropine 1% Solution 1 Drop(s) Both EYES two times a day  cloZAPine 250 milliGRAM(s) Oral at bedtime  lidocaine 1% Injectable 20 milliLiter(s) Local Injection once  lidocaine 1% Injectable 30 milliLiter(s) Local Injection once  lithium 450 milliGRAM(s) Oral two times a day  nicotine -  14 mG/24Hr(s) Patch 1 Patch Transdermal daily  piperacillin/tazobactam IVPB.. 3.375 Gram(s) IV Intermittent every 8 hours  senna 2 Tablet(s) Oral at bedtime  vancomycin  IVPB 1250 milliGRAM(s) IV Intermittent every 12 hours    MEDICATIONS  (PRN):  acetaminophen     Tablet .. 650 milliGRAM(s) Oral every 6 hours PRN Temp greater or equal to 38C (100.4F), Mild Pain (1 - 3)  aluminum hydroxide/magnesium hydroxide/simethicone Suspension 30 milliLiter(s) Oral every 4 hours PRN Dyspepsia  benzocaine 15 mG/menthol 3.6 mG Lozenge 1 Lozenge Oral every 4 hours PRN Sore Throat  benzocaine 15 mG/menthol 3.6 mG Lozenge 1 Lozenge Oral four times a day PRN Sore Throat  melatonin 3 milliGRAM(s) Oral at bedtime PRN Insomnia  ondansetron Injectable 4 milliGRAM(s) IV Push every 8 hours PRN Nausea and/or Vomiting  oxycodone    5 mG/acetaminophen 325 mG 1 Tablet(s) Oral every 6 hours PRN Severe Pain (7 - 10)  
MEDICATIONS  (STANDING):  atropine 1% Solution 1 Drop(s) Both EYES two times a day  clozapine 250 milliGRAM(s) Oral at bedtime  enoxaparin Injectable 40 milliGRAM(s) SubCutaneous every 24 hours  fentanyl  PCA (50 MICROgram(s)/mL) Rescue Clinician Bolus 10 MICROGram(s) IV Push every 30 minutes  lactated ringers. 1000 milliLiter(s) (75 mL/Hr) IV Continuous <Continuous>  lidocaine 1% Injectable 20 milliLiter(s) Local Injection once  lithium 450 milliGRAM(s) Oral two times a day  nicotine -  14 mG/24Hr(s) Patch 1 Patch Transdermal daily  piperacillin/tazobactam IVPB.. 3.375 Gram(s) IV Intermittent every 8 hours  senna 2 Tablet(s) Oral at bedtime  vancomycin  IVPB 1500 milliGRAM(s) IV Intermittent <User Schedule>    MEDICATIONS  (PRN):  acetaminophen     Tablet .. 650 milliGRAM(s) Oral every 6 hours PRN Temp greater or equal to 38C (100.4F), Mild Pain (1 - 3)  aluminum hydroxide/magnesium hydroxide/simethicone Suspension 30 milliLiter(s) Oral every 4 hours PRN Dyspepsia  benzocaine 15 mG/menthol 3.6 mG Lozenge 1 Lozenge Oral every 4 hours PRN Sore Throat  melatonin 3 milliGRAM(s) Oral at bedtime PRN Insomnia  ondansetron Injectable 4 milliGRAM(s) IV Push every 8 hours PRN Nausea and/or Vomiting  oxycodone    5 mG/acetaminophen 325 mG 1 Tablet(s) Oral every 6 hours PRN Severe Pain (7 - 10)  
MEDICATIONS  (STANDING):  atropine 1% Solution 1 Drop(s) Both EYES two times a day  BUpivacaine liposome 1.3% Injectable 20 milliLiter(s) Local Injection once  cloZAPine 250 milliGRAM(s) Oral at bedtime  lidocaine 1% Injectable 20 milliLiter(s) Local Injection once  lidocaine 1% Injectable 30 milliLiter(s) Local Injection once  lithium 450 milliGRAM(s) Oral two times a day  nicotine -  14 mG/24Hr(s) Patch 1 Patch Transdermal daily  piperacillin/tazobactam IVPB.. 3.375 Gram(s) IV Intermittent every 8 hours  senna 2 Tablet(s) Oral at bedtime  vancomycin  IVPB 1500 milliGRAM(s) IV Intermittent <User Schedule>    MEDICATIONS  (PRN):  acetaminophen     Tablet .. 650 milliGRAM(s) Oral every 6 hours PRN Temp greater or equal to 38C (100.4F), Mild Pain (1 - 3)  aluminum hydroxide/magnesium hydroxide/simethicone Suspension 30 milliLiter(s) Oral every 4 hours PRN Dyspepsia  benzocaine 15 mG/menthol 3.6 mG Lozenge 1 Lozenge Oral four times a day PRN Sore Throat  benzocaine 15 mG/menthol 3.6 mG Lozenge 1 Lozenge Oral every 4 hours PRN Sore Throat  melatonin 3 milliGRAM(s) Oral at bedtime PRN Insomnia  ondansetron Injectable 4 milliGRAM(s) IV Push every 8 hours PRN Nausea and/or Vomiting  oxycodone    5 mG/acetaminophen 325 mG 1 Tablet(s) Oral every 6 hours PRN Severe Pain (7 - 10)

## 2022-10-22 NOTE — BH CONSULTATION LIAISON PROGRESS NOTE - NSICDXBHSECONDARYDX_PSY_ALL_CORE
Schizoaffective disorder, depressive type   F25.1  
Pleural effusion, left   J90  
Schizoaffective disorder, depressive type   F25.1

## 2022-10-23 LAB
CULTURE RESULTS: SIGNIFICANT CHANGE UP
SPECIMEN SOURCE: SIGNIFICANT CHANGE UP
VANCOMYCIN TROUGH SERPL-MCNC: 10.6 UG/ML — SIGNIFICANT CHANGE UP (ref 10–20)
VANCOMYCIN TROUGH SERPL-MCNC: 19.9 UG/ML — SIGNIFICANT CHANGE UP (ref 10–20)

## 2022-10-23 PROCEDURE — 71045 X-RAY EXAM CHEST 1 VIEW: CPT | Mod: 26

## 2022-10-23 RX ADMIN — Medication 300 MILLIGRAM(S): at 22:58

## 2022-10-23 RX ADMIN — LITHIUM CARBONATE 450 MILLIGRAM(S): 300 TABLET, EXTENDED RELEASE ORAL at 19:28

## 2022-10-23 RX ADMIN — LITHIUM CARBONATE 450 MILLIGRAM(S): 300 TABLET, EXTENDED RELEASE ORAL at 06:36

## 2022-10-23 RX ADMIN — Medication 1 PATCH: at 12:49

## 2022-10-23 RX ADMIN — Medication 1 DROP(S): at 19:28

## 2022-10-23 RX ADMIN — Medication 300 MILLIGRAM(S): at 12:44

## 2022-10-23 RX ADMIN — PIPERACILLIN AND TAZOBACTAM 25 GRAM(S): 4; .5 INJECTION, POWDER, LYOPHILIZED, FOR SOLUTION INTRAVENOUS at 06:36

## 2022-10-23 RX ADMIN — PIPERACILLIN AND TAZOBACTAM 25 GRAM(S): 4; .5 INJECTION, POWDER, LYOPHILIZED, FOR SOLUTION INTRAVENOUS at 19:36

## 2022-10-23 RX ADMIN — PIPERACILLIN AND TAZOBACTAM 25 GRAM(S): 4; .5 INJECTION, POWDER, LYOPHILIZED, FOR SOLUTION INTRAVENOUS at 22:58

## 2022-10-23 RX ADMIN — Medication 1 DROP(S): at 06:35

## 2022-10-23 RX ADMIN — CLOZAPINE 250 MILLIGRAM(S): 150 TABLET, ORALLY DISINTEGRATING ORAL at 22:58

## 2022-10-23 RX ADMIN — Medication 300 MILLIGRAM(S): at 00:31

## 2022-10-23 RX ADMIN — ENOXAPARIN SODIUM 40 MILLIGRAM(S): 100 INJECTION SUBCUTANEOUS at 22:58

## 2022-10-23 RX ADMIN — Medication 1 PATCH: at 11:37

## 2022-10-23 RX ADMIN — SENNA PLUS 2 TABLET(S): 8.6 TABLET ORAL at 22:57

## 2022-10-23 NOTE — PROGRESS NOTE ADULT - SUBJECTIVE AND OBJECTIVE BOX
POD 2 s/p L VATS decortication, evacuation of empyema    Subjective: no complaints denies CP, palpitations, SOB, cough, fever, chills, itchiness/rash, diaphoresis, vision changes, HA, dizziness/lightheadedness, numbness/tingling, abd pain, N/V     T(C): 37 (10-23-22 @ 04:44), Max: 37 (10-23-22 @ 04:44)  HR: 102 (10-23-22 @ 04:44) (76 - 102)  BP: 108/66 (10-23-22 @ 04:44) (108/66 - 130/75)  RR: 20 (10-23-22 @ 04:44) (17 - 20)  SpO2: 92% (10-23-22 @ 04:44) (92% - 94%)    10-22    136  |  97<L>  |  6.7<L>  ----------------------------<  120<H>  4.1   |  32.0<H>  |  0.94    Ca    9.0      22 Oct 2022 06:50  Mg     2.1     10-22                       10.9   13.17 )-----------( 520      ( 22 Oct 2022 06:50 )             34.6          I&O's Detail    21 Oct 2022 07:01  -  22 Oct 2022 07:00  --------------------------------------------------------  IN:    Oral Fluid: 240 mL  Total IN: 240 mL    OUT:    Chest Tube (mL): 75 mL    Chest Tube (mL): 175 mL    Voided (mL): 1350 mL  Total OUT: 1600 mL  Total NET: -1360 mL    22 Oct 2022 07:01  -  23 Oct 2022 06:26  --------------------------------------------------------  IN:    Oral Fluid: 440 mL  Total IN: 440 mL    OUT:    Chest Tube (mL): 25 mL    Chest Tube (mL): 85 mL  Total OUT: 110 mL  Total NET: 330 mL    Drug Dosing Weight  Height (cm): 182.9 (21 Oct 2022 11:17)  Weight (kg): 88.5 (21 Oct 2022 11:17)  BMI (kg/m2): 26.5 (21 Oct 2022 11:17)  BSA (m2): 2.11 (21 Oct 2022 11:17)    MEDICATIONS  (STANDING):  atropine 1% Solution 1 Drop(s) Both EYES two times a day  cloZAPine 250 milliGRAM(s) Oral at bedtime  enoxaparin Injectable 40 milliGRAM(s) SubCutaneous every 24 hours  fentaNYL  PCA (50 MICROgram(s)/mL) Rescue Clinician Bolus 10 MICROGram(s) IV Push every 30 minutes  lactated ringers. 1000 milliLiter(s) (75 mL/Hr) IV Continuous <Continuous>  lidocaine 1% Injectable 20 milliLiter(s) Local Injection once  lithium 450 milliGRAM(s) Oral two times a day  nicotine -  14 mG/24Hr(s) Patch 1 Patch Transdermal daily  piperacillin/tazobactam IVPB.. 3.375 Gram(s) IV Intermittent every 8 hours  senna 2 Tablet(s) Oral at bedtime  vancomycin  IVPB 1500 milliGRAM(s) IV Intermittent <User Schedule>    MEDICATIONS  (PRN):  acetaminophen     Tablet .. 650 milliGRAM(s) Oral every 6 hours PRN Temp greater or equal to 38C (100.4F), Mild Pain (1 - 3)  aluminum hydroxide/magnesium hydroxide/simethicone Suspension 30 milliLiter(s) Oral every 4 hours PRN Dyspepsia  benzocaine 15 mG/menthol 3.6 mG Lozenge 1 Lozenge Oral every 4 hours PRN Sore Throat  melatonin 3 milliGRAM(s) Oral at bedtime PRN Insomnia  ondansetron Injectable 4 milliGRAM(s) IV Push every 8 hours PRN Nausea and/or Vomiting  oxycodone    5 mG/acetaminophen 325 mG 1 Tablet(s) Oral every 6 hours PRN Severe Pain (7 - 10)    Physical Exam  Gen: NAD  Neuro: A&OX3 non focal speech clear and intact  Pulm: decreased on left clear on right, no wheezing  CV: S1S2 RRR no murmurs  Abd: +BS soft NT ND  Extrem/MS: no edema/cyanosis, HUNTER  skin: warm dry perfused no rashes  Incision(s): L VATS C/D/I with sutures  2CT to suction no air leak

## 2022-10-23 NOTE — PROGRESS NOTE ADULT - ASSESSMENT
47M, pmhx schizoaffective disorder, bipolar, smoker, current [facility] resident, p/w left sided chest/abd pain, noted to have fever and leukocytosis, CT with large loculated left effusion, pigtail placed 10/12 and d/c'd 10/16, pt with reaccumulated fluid requiring pigtail again 10/18, now s/p L VATS decortication, evacuation of empyema 10/21 with Dr. Abreu;

## 2022-10-23 NOTE — PROGRESS NOTE ADULT - PROBLEM SELECTOR PLAN 1
S/P L VATS 10/21  oob, ambulate as tolerated  percocet PRN pain   encourage chest PT, Incentive Spirometry use, deep breathing and coughing  CT to suction, waterseal trial today vs tomorrow   tolerating diet, cont bowel regimen  cont home psych meds  psych consult appreciated  f/u OR cultures  cont vanco/zosyn  cont supportive care  plan to be d/w thoracic surgery attending n AM miriam

## 2022-10-24 LAB
ANION GAP SERPL CALC-SCNC: 10 MMOL/L — SIGNIFICANT CHANGE UP (ref 5–17)
BUN SERPL-MCNC: 11.1 MG/DL — SIGNIFICANT CHANGE UP (ref 8–20)
CALCIUM SERPL-MCNC: 9.2 MG/DL — SIGNIFICANT CHANGE UP (ref 8.4–10.5)
CHLORIDE SERPL-SCNC: 100 MMOL/L — SIGNIFICANT CHANGE UP (ref 98–107)
CO2 SERPL-SCNC: 27 MMOL/L — SIGNIFICANT CHANGE UP (ref 22–29)
CREAT SERPL-MCNC: 0.95 MG/DL — SIGNIFICANT CHANGE UP (ref 0.5–1.3)
EGFR: 101 ML/MIN/1.73M2 — SIGNIFICANT CHANGE UP
GLUCOSE SERPL-MCNC: 118 MG/DL — HIGH (ref 70–99)
HCT VFR BLD CALC: 30.1 % — LOW (ref 39–50)
HGB BLD-MCNC: 9.5 G/DL — LOW (ref 13–17)
MAGNESIUM SERPL-MCNC: 2.1 MG/DL — SIGNIFICANT CHANGE UP (ref 1.6–2.6)
MCHC RBC-ENTMCNC: 29.5 PG — SIGNIFICANT CHANGE UP (ref 27–34)
MCHC RBC-ENTMCNC: 31.6 GM/DL — LOW (ref 32–36)
MCV RBC AUTO: 93.5 FL — SIGNIFICANT CHANGE UP (ref 80–100)
PLATELET # BLD AUTO: 505 K/UL — HIGH (ref 150–400)
POTASSIUM SERPL-MCNC: 4.1 MMOL/L — SIGNIFICANT CHANGE UP (ref 3.5–5.3)
POTASSIUM SERPL-SCNC: 4.1 MMOL/L — SIGNIFICANT CHANGE UP (ref 3.5–5.3)
RBC # BLD: 3.22 M/UL — LOW (ref 4.2–5.8)
RBC # FLD: 13.9 % — SIGNIFICANT CHANGE UP (ref 10.3–14.5)
SODIUM SERPL-SCNC: 137 MMOL/L — SIGNIFICANT CHANGE UP (ref 135–145)
WBC # BLD: 13.16 K/UL — HIGH (ref 3.8–10.5)
WBC # FLD AUTO: 13.16 K/UL — HIGH (ref 3.8–10.5)

## 2022-10-24 PROCEDURE — 71045 X-RAY EXAM CHEST 1 VIEW: CPT | Mod: 26

## 2022-10-24 PROCEDURE — 99222 1ST HOSP IP/OBS MODERATE 55: CPT

## 2022-10-24 RX ORDER — VANCOMYCIN HCL 1 G
1250 VIAL (EA) INTRAVENOUS EVERY 12 HOURS
Refills: 0 | Status: DISCONTINUED | OUTPATIENT
Start: 2022-10-25 | End: 2022-10-25

## 2022-10-24 RX ADMIN — LITHIUM CARBONATE 450 MILLIGRAM(S): 300 TABLET, EXTENDED RELEASE ORAL at 18:58

## 2022-10-24 RX ADMIN — SENNA PLUS 2 TABLET(S): 8.6 TABLET ORAL at 21:02

## 2022-10-24 RX ADMIN — PIPERACILLIN AND TAZOBACTAM 25 GRAM(S): 4; .5 INJECTION, POWDER, LYOPHILIZED, FOR SOLUTION INTRAVENOUS at 21:02

## 2022-10-24 RX ADMIN — Medication 1 PATCH: at 12:52

## 2022-10-24 RX ADMIN — Medication 1 PATCH: at 20:31

## 2022-10-24 RX ADMIN — Medication 1 PATCH: at 12:56

## 2022-10-24 RX ADMIN — Medication 300 MILLIGRAM(S): at 11:37

## 2022-10-24 RX ADMIN — Medication 1 DROP(S): at 17:47

## 2022-10-24 RX ADMIN — ENOXAPARIN SODIUM 40 MILLIGRAM(S): 100 INJECTION SUBCUTANEOUS at 08:48

## 2022-10-24 RX ADMIN — PIPERACILLIN AND TAZOBACTAM 25 GRAM(S): 4; .5 INJECTION, POWDER, LYOPHILIZED, FOR SOLUTION INTRAVENOUS at 12:57

## 2022-10-24 RX ADMIN — Medication 1 PATCH: at 07:50

## 2022-10-24 RX ADMIN — PIPERACILLIN AND TAZOBACTAM 25 GRAM(S): 4; .5 INJECTION, POWDER, LYOPHILIZED, FOR SOLUTION INTRAVENOUS at 06:30

## 2022-10-24 RX ADMIN — CLOZAPINE 250 MILLIGRAM(S): 150 TABLET, ORALLY DISINTEGRATING ORAL at 21:01

## 2022-10-24 RX ADMIN — LITHIUM CARBONATE 450 MILLIGRAM(S): 300 TABLET, EXTENDED RELEASE ORAL at 06:31

## 2022-10-24 RX ADMIN — Medication 1 DROP(S): at 06:31

## 2022-10-24 NOTE — CONSULT NOTE ADULT - SUBJECTIVE AND OBJECTIVE BOX
Thoracic Surgery Consult called for a Large Left Loculated Pleural Effusion    HPI:  47 year old male patient, current [place] resident, admitted to medicine with c/o left sided abdominal pain/chest discomfort X 4 days. +Dry cough. Patient found with Leukocytosis (WBC >20), low grade fever, and CT chest showing Large Left Loculated Pleural Effusion. Patient in no acute respiratory distress, with SpO2 stable on room air.     PAST MEDICAL & SURGICAL HISTORY:  HTN  ?Psych disorder (patient states they don't tell him which one he has, but he takes medicine for it)  Tonsillectomy    REVIEW OF SYSTEMS:  General: No Weight change/ Fatigue/ HA/ Dizziness elicited	  Skin: No Rashes/ Lesions elicited  Ophthalmologic: No change in vision elicited  ENMT: No Hearing loss/ Drainage/ Lesions elicited  Respiratory and Thorax: +Dry Cough. No Wheezing/ SOB/ Hemoptysis/ Sputum production elicited  Cardiovascular: No Chest pain/ Palpitations/ Diaphoresis	elicited  Gastrointestinal: No Nausea/ Vomiting/ diarrhea elicited  Genitourinary: No Hematuria/ Dysuria elicited  Musculoskeletal: No Pain/ Weakness elicited	  Neurological: No Seizures/ TIA/ CVA elicited  Psychiatric: No Dementia elicited. Unknown psych history per patient.  	  Hematology/Lymphatics: No hx of bleeding/ Edema elicited    MEDICATIONS  (STANDING):  To be reviewed in chart. Patient doesn't remember.     Allergies: No Known Allergies    SOCIAL HISTORY:  Former smoker, quit 5 months ago when he was admitted to [facility], 2PPD since young  Denies any active alcohol or illicit drug use / abuse.   Independent for ADLS.     FAMILY HISTORY:  No pertinent family history elicited.     Vital Signs Last 24 Hrs  T(C): 36.9 (12 Oct 2022 05:47), Max: 37.8 (11 Oct 2022 20:23)  T(F): 98.4 (12 Oct 2022 05:47), Max: 100 (11 Oct 2022 20:23)  HR: 105 (12 Oct 2022 05:47) (89 - 122)  BP: 120/67 (12 Oct 2022 05:47) (112/74 - 121/72)  RR: 20 (12 Oct 2022 05:47) (18 - 25)  SpO2: 94% (12 Oct 2022 05:47) (94% - 96%)  Parameters below as of 12 Oct 2022 01:28  Patient On (Oxygen Delivery Method): room air    PHYSICAL EXAMINATION:  General: Malodorous, pale, lying flat in bed with baseball cap over face in NAD  Neurology: Awake, nonfocal, HUNTER x 4  Eyes: Scleras clear, Gross vision intact  ENT: Gross hearing intact, grossly patent pharynx, no stridor  Neck: Neck supple, trachea midline, No JVD  Respiratory: +Diminished BSs throughout Left. No accessory muscle use.   CV: RRR, S1S2  Abdominal: Soft, NT, ND +BS,   Extremities: No edema, + peripheral pulses  Skin: No Rashes, Hematoma, Ecchymosis  Psych: Oriented x 3    LABS:                        14.9   21.96 )-----------( 603      ( 11 Oct 2022 21:50 )             44.3     10-11    131<L>  |  96<L>  |  10.5  ----------------------------<  140<H>  4.4   |  22.0  |  0.93    Ca    9.4      11 Oct 2022 21:50    TPro  7.1  /  Alb  3.5  /  TBili  0.3<L>  /  DBili  x   /  AST  12  /  ALT  11  /  AlkPhos  121<H>  10-11    Urinalysis Basic - ( 12 Oct 2022 01:20 )  Color: Yellow / Appearance: Clear / S.020 / pH: x  Gluc: x / Ketone: Trace  / Bili: Negative / Urobili: Negative mg/dL   Blood: x / Protein: 15 / Nitrite: Negative   Leuk Esterase: Trace / RBC: 3-5 /HPF / WBC 3-5 /HPF   Sq Epi: x / Non Sq Epi: Negative / Bacteria: Few      RADIOLOGY & ADDITIONAL STUDIES:    CT Chest:  < from: CT Chest No Cont (10.12.22 @ 04:50) >  IMPRESSION:  Large loculated left pleural effusion with associated compressive atelectasis.  Mediastinal lymphadenopathy.  Right lower lobe 6 mm pulmonary nodule. In the absence of malignancy history, 6-12 months follow-up is advised as perFleischner Society recommendations. If the patient has underlying history of malignancy, metastasis should be considered and follow-up as per clinical protocol.  < end of copied text >  
Patient is a 47y old  Male who presents with a chief complaint of Large Left loculated pleural effusion (18 Oct 2022 16:04)      BRIEF HOSPITAL COURSE: 47 year old male with PMH of Schizoaffective disorder, bipolar disorder and tobacco abuse, who initially presented with complaints of left sided chest pain, shortness of breath, and low grade fevers. He was found to have a large left sided loculated pleural effusion and underwent left-sided pigtail placement by CT surgery. Pleural cultures negative. He was on empiric antibiotics with Ceftriaxone and Azithromycin.      Events last 24 hours: Noted to have increasing oxygen requirements over the past 24 hours. Repeat imagining revealed increased size of left pleural effusion, underwent chest tube placement. Pleural studies appear exudative, repeat cultures are pending, antibiotics broadened. Today noted to have worsening mental status, VBG with respiratory acidosis, MICU consulted. ABG obtained, compensated metabolic alkalosis pH 7.41.       PAST MEDICAL & SURGICAL HISTORY:  Schizoaffective disorder      Bipolar disorder      History of tobacco abuse      S/P tonsillectomy        SOCIAL HISTORY: Tobacco abuse. Resides at [facility]. Unable to obtain further.        Review of Systems: Due to altered mental status, subjective information was not able to be obtained from the patient. History was obtained, to the extent possible, from review of the chart and collateral sources of information.        Medications:  piperacillin/tazobactam IVPB.. 3.375 Gram(s) IV Intermittent every 8 hours  vancomycin  IVPB 1250 milliGRAM(s) IV Intermittent every 8 hours        acetaminophen     Tablet .. 650 milliGRAM(s) Oral every 6 hours PRN  clonazePAM  Tablet 1 milliGRAM(s) Oral three times a day  cloZAPine 250 milliGRAM(s) Oral at bedtime  lithium 450 milliGRAM(s) Oral two times a day  melatonin 3 milliGRAM(s) Oral at bedtime PRN  ondansetron Injectable 4 milliGRAM(s) IV Push every 8 hours PRN  oxycodone    5 mG/acetaminophen 325 mG 1 Tablet(s) Oral every 6 hours PRN        aluminum hydroxide/magnesium hydroxide/simethicone Suspension 30 milliLiter(s) Oral every 4 hours PRN  senna 2 Tablet(s) Oral at bedtime            atropine 1% Solution 1 Drop(s) Both EYES two times a day  benzocaine 15 mG/menthol 3.6 mG Lozenge 1 Lozenge Oral every 4 hours PRN    lidocaine 1% Injectable 20 milliLiter(s) Local Injection once  lidocaine 1% Injectable 30 milliLiter(s) Local Injection once  nicotine -  14 mG/24Hr(s) Patch 1 Patch Transdermal daily          ICU Vital Signs Last 24 Hrs  T(C): 36.8 (18 Oct 2022 16:41), Max: 37.2 (18 Oct 2022 12:24)  T(F): 98.3 (18 Oct 2022 16:41), Max: 99 (18 Oct 2022 12:24)  HR: 108 (18 Oct 2022 18:18) (85 - 108)  BP: 116/68 (18 Oct 2022 18:18) (93/59 - 118/78)  BP(mean): 78 (18 Oct 2022 18:18) (78 - 78)  ABP: --  ABP(mean): --  RR: 18 (18 Oct 2022 18:18) (12 - 20)  SpO2: 100% (18 Oct 2022 18:18) (92% - 100%)    O2 Parameters below as of 18 Oct 2022 18:18  Patient On (Oxygen Delivery Method): BiPAP/CPAP            ABG - ( 18 Oct 2022 17:36 )  pH, Arterial: 7.410 pH, Blood: x     /  pCO2: 62    /  pO2: 60    / HCO3: 39    / Base Excess: 14.7  /  SaO2: 93.3                I&O's Detail    17 Oct 2022 07:01  -  18 Oct 2022 07:00  --------------------------------------------------------  IN:  Total IN: 0 mL    OUT:    Voided (mL): 500 mL  Total OUT: 500 mL    Total NET: -500 mL      18 Oct 2022 07:01  -  18 Oct 2022 20:24  --------------------------------------------------------  IN:  Total IN: 0 mL    OUT:    Chest Tube (mL): 460 mL    Voided (mL): 200 mL  Total OUT: 660 mL    Total NET: -660 mL            LABS:                        11.4   13.07 )-----------( 506      ( 18 Oct 2022 16:03 )             36.8     10-18    137  |  98  |  4.0<L>  ----------------------------<  106<H>  4.6   |  34.0<H>  |  0.64    Ca    8.9      18 Oct 2022 16:03  Phos  3.0     10-18  Mg     2.0     10-18            CAPILLARY BLOOD GLUCOSE        PT/INR - ( 18 Oct 2022 04:40 )   PT: 17.3 sec;   INR: 1.49 ratio         PTT - ( 18 Oct 2022 04:40 )  PTT:30.7 sec    CULTURES:  Culture Results:   No growth at 5 days (10-12 @ 12:02)  Culture Results:   Testing in progress (10-12 @ 12:02)  Culture Results:   <10,000 CFU/mL Normal Urogenital Carey (10-12 @ 01:20)  Rapid RVP Result: NotDetec (10-11 @ 21:50)  Culture Results:   No Growth Final (10-11 @ 21:50)  Culture Results:   No Growth Final (10-11 @ 21:50)            Physical Examination:    General: Lethargic, diaphoretic, pale, and toxic appearing. No acute distress.      HEENT: Pupils 7mm non-reactive to light bilaterally.    PULM: Coarse bilaterally, diminished on left    CVS: Regular rate and rhythm, no murmurs, rubs, or gallops    ABD: Soft, nondistended, nontender, normoactive bowel sounds, no masses    EXT: No edema, nontender    SKIN: Warm and well perfused, no rashes noted.    NEURO: Somnolent, briefly opens eyes but does not sustain eye opening. Follows commands and is intermittently verbally responsive to verbal stimuli.        RADIOLOGY: < from: CT Abdomen and Pelvis No Cont (10.18.22 @ 16:26) >    IMPRESSION:    Persistent moderate to large multiloculated left-sided pleural effusion,   including small amount of air within the component along the anterior   mediastinal pleura.    Interval placement of left-sided chest tube.    Splenomegaly.    Infrarenal abdominal aortic aneurysm measuring 5 cm.  Bilateral common iliac artery aneurysms as discussed.    Other findings as discussed above.      --- End of Report ---      TAMELA WYNN MD; Attending Radiologist  This document has been electronically signed. Oct 18 2022  5:10PM    < end of copied text >        < from: CT Head No Cont (10.18.22 @ 16:27) >    ACC: [id number] EXAM:  CT BRAIN                          PROCEDURE DATE:  10/18/2022          INTERPRETATION:  Clinical indications: Lethargy.    Multiple axial sections were performed from base of vertex without   contrast enhancement coronal and sagittal reconstructions were performed.    The lateral ventricles have a normal concentric.    There is no acute hemorrhage mass or mass effect seen.    Evaluation of the osseous structures with the appropriate window   demonstrates hyperostosis frontalis interna    The paranasal sinuses mastoid and middle ear regions appear clear.    IMPRESSION: There is no evidence of acute hemorrhage mass or mass effect.    --- End of Report ---      KAVITHA RODRIGUEZ MD; Attending Radiologist  This document has been electronically signed. Oct 18 2022  4:29PM    < end of copied text >            CRITICAL CARE TIME SPENT: 48 minutes spent performing frequent bedside reassessments and augmenting plan of care to address problems of acute critical illness that pose high probability of life threatening deterioration and/or end organ damage/dysfunction and discussing goals of care, non-inclusive of time spent on procedures performed.
                                           [facility]                                                INFECTIOUS DISEASES  =======================================================                               Ulises Garces MD#    Mario Herring MD*                           Hillary Garcia MD*   Mirtha Flores MD*            Diplomates American Board of Internal Medicine & Infectious Diseases                  # [place] Office - Appt - Tel  [phone] Fax [phone]                * [place] Office - Appt - Tel [phone] Fax [phone]                                  Hospital Consult line:  [phone]  =======================================================      [id number]  JAZZY GRAMAJO   HPI:  46 yo male with hx of Schizoaffective disorder, bipolar and tobacco abuse presents with complaints of left sided pain and shortness of breath. Patient reports 4 day  history of body aches with poor appetite. He has only been drinking liquids at home. Patient was found to be tachycardiac up to 140s at [facility] and was therefore sent in for an evaluation. Patient endorses subjective fevers associated with a dry cough while at [facility]. While in the ED, cxr and CT chest was obtained which showed a large loculated left pleural effusion. CT surgery was consulted and admission to medicine was requested.  (12 Oct 2022 08:47)          I have personally reviewed the labs and data; pertinent labs and data are listed in this note; please see below.   =======================================================  Past Medical & Surgical Hx:  =====================  PAST MEDICAL & SURGICAL HISTORY:  Schizoaffective disorder      Bipolar disorder      History of tobacco abuse      S/P tonsillectomy        Problem List:  ==========  HEALTH ISSUES - PROBLEM Dx:  Pleural effusion, left    Schizoaffective disorder, depressive type          Social Hx:  =======  no toxic habits currently    FAMILY HISTORY:  FH: Alzheimers disease    no significant family history of immunosuppressive disorders in mother or father   =======================================================    REVIEW OF SYSTEMS:  CONSTITUTIONAL:  No Fever or chills  HEENT:  No diplopia or blurred vision.  No earache, sore throat or runny nose.  CARDIOVASCULAR:  No pressure, squeezing, strangling, tightness, heaviness or aching about the chest, neck, axilla or epigastrium.  RESPIRATORY:  No cough, shortness of breath  GASTROINTESTINAL:  No nausea, vomiting or diarrhea.  GENITOURINARY:  No dysuria, frequency or urgency. No Blood in urine  MUSCULOSKELETAL:  no joint aches, no muscle pain  SKIN:  No change in skin, hair or nails.  NEUROLOGIC:  No Headaches, seizures or weakness.  PSYCHIATRIC:  No disorder of thought or mood.  ENDOCRINE:  No heat or cold intolerance  HEMATOLOGICAL:  No easy bruising or bleeding.    =======================================================  Allergies    No Known Allergies    Intolerances    Antibiotics:  piperacillin/tazobactam IVPB.. 3.375 Gram(s) IV Intermittent every 8 hours    Other medications:  atropine 1% Solution 1 Drop(s) Both EYES two times a day  cloZAPine 250 milliGRAM(s) Oral at bedtime  enoxaparin Injectable 40 milliGRAM(s) SubCutaneous every 24 hours  lithium 450 milliGRAM(s) Oral two times a day  nicotine -  14 mG/24Hr(s) Patch 1 Patch Transdermal daily  senna 2 Tablet(s) Oral at bedtime   azithromycin  IVPB   255 mL/Hr IV Intermittent (10-12-22 @ 07:06)    cefTRIAXone Injectable.   1000 milliGRAM(s) IV Push (10-11-22 @ 21:59)    piperacillin/tazobactam IVPB.   200 mL/Hr IV Intermittent (10-12-22 @ 09:09)    piperacillin/tazobactam IVPB.-   25 mL/Hr IV Intermittent (10-12-22 @ 15:21)    piperacillin/tazobactam IVPB..   25 mL/Hr IV Intermittent (10-12-22 @ 22:45)   25 mL/Hr IV Intermittent (10-13-22 @ 06:32)   25 mL/Hr IV Intermittent (10-13-22 @ 14:14)   25 mL/Hr IV Intermittent (10-13-22 @ 23:30)   25 mL/Hr IV Intermittent (10-14-22 @ 05:56)   25 mL/Hr IV Intermittent (10-14-22 @ 13:07)   25 mL/Hr IV Intermittent (10-14-22 @ 23:24)   25 mL/Hr IV Intermittent (10-15-22 @ 05:49)   25 mL/Hr IV Intermittent (10-15-22 @ 13:20)   25 mL/Hr IV Intermittent (10-15-22 @ 21:27)   25 mL/Hr IV Intermittent (10-16-22 @ 05:47)   25 mL/Hr IV Intermittent (10-16-22 @ 14:33)   25 mL/Hr IV Intermittent (10-16-22 @ 21:23)   25 mL/Hr IV Intermittent (10-17-22 @ 05:09)   25 mL/Hr IV Intermittent (10-17-22 @ 13:45)   25 mL/Hr IV Intermittent (10-17-22 @ 21:15)   25 mL/Hr IV Intermittent (10-18-22 @ 05:11)   25 mL/Hr IV Intermittent (10-18-22 @ 14:00)   25 mL/Hr IV Intermittent (10-18-22 @ 21:18)   25 mL/Hr IV Intermittent (10-19-22 @ 05:22)   25 mL/Hr IV Intermittent (10-19-22 @ 13:18)   25 mL/Hr IV Intermittent (10-19-22 @ 22:17)   25 mL/Hr IV Intermittent (10-20-22 @ 05:39)   25 mL/Hr IV Intermittent (10-20-22 @ 13:27)   25 mL/Hr IV Intermittent (10-20-22 @ 21:26)   25 mL/Hr IV Intermittent (10-21-22 @ 05:37)    piperacillin/tazobactam IVPB..   25 mL/Hr IV Intermittent (10-22-22 @ 06:18)   25 mL/Hr IV Intermittent (10-22-22 @ 13:30)   25 mL/Hr IV Intermittent (10-22-22 @ 23:25)   25 mL/Hr IV Intermittent (10-23-22 @ 06:36)   25 mL/Hr IV Intermittent (10-23-22 @ 19:36)   25 mL/Hr IV Intermittent (10-23-22 @ 22:58)   25 mL/Hr IV Intermittent (10-24-22 @ 06:30)   25 mL/Hr IV Intermittent (10-24-22 @ 12:57)    vancomycin  IVPB   166.67 mL/Hr IV Intermittent (10-19-22 @ 20:59)    vancomycin  IVPB   166.67 mL/Hr IV Intermittent (10-18-22 @ 18:09)   166.67 mL/Hr IV Intermittent (10-19-22 @ 01:08)   166.67 mL/Hr IV Intermittent (10-19-22 @ 09:07)    vancomycin  IVPB   300 mL/Hr IV Intermittent (10-22-22 @ 11:36)   300 mL/Hr IV Intermittent (10-23-22 @ 00:31)   300 mL/Hr IV Intermittent (10-23-22 @ 12:44)   300 mL/Hr IV Intermittent (10-23-22 @ 22:58)   300 mL/Hr IV Intermittent (10-24-22 @ 11:37)    vancomycin  IVPB   300 mL/Hr IV Intermittent (10-20-22 @ 10:04)   300 mL/Hr IV Intermittent (10-20-22 @ 21:26)   300 mL/Hr IV Intermittent (10-21-22 @ 10:23)      ======================================================  Physical Exam:  ============  T(F): 97.8 (24 Oct 2022 20:15), Max: 98.2 (23 Oct 2022 22:23)  HR: 108 (24 Oct 2022 20:15)  BP: 115/72 (24 Oct 2022 20:15)  RR: 18 (24 Oct 2022 20:15)  SpO2: 94% (24 Oct 2022 20:15) (93% - 98%)  temp max in last 48H T(F): , Max: 98.7 (10-23-22 @ 17:44)    General:  No acute distress.  Eye:  Normal conjunctiva.  HENT: Normocephalic, Oral mucosa is moist, No pharyngeal erythema, No sinus tenderness.  Neck: Supple, No lymphadenopathy.  Respiratory: Lungs are clear to auscultation, Respirations are non-labored.  Cardiovascular: Normal rate, Regular rhythm, s1 + s2 left Ct in place  Gastrointestinal: Soft, Non-tender, Non-distended, Normal bowel sounds.  Genitourinary: No costovertebral angle tenderness.  Lymphatics: No lymphadenopathy neck,   Musculoskeletal: Normal range of motion, Normal strength.  Integumentary: No rash.  Neurologic: Alert, Oriented, No focal deficits  Psychiatric: Appropriate mood & affect.    =======================================================  Labs:                        9.5    13.16 )-----------( 505      ( 24 Oct 2022 07:23 )             30.1     10-24    137  |  100  |  11.1  ----------------------------<  118<H>  4.1   |  27.0  |  0.95    Ca    9.2      24 Oct 2022 07:23  Mg     2.1     10-24        Culture - Fungal, Tissue (collected 10-21-22 @ 12:40)  Source: .Tissue Production of Decortication 2/2    Culture - Acid Fast - Tissue w/Smear (collected 10-21-22 @ 12:40)  Source: .Tissue Production of Decortication 2/2    Culture - Tissue with Gram Stain (collected 10-21-22 @ 12:40)  Source: .Tissue Production of Decortication 2/2  Gram Stain (10-22-22 @ 02:45):    Few polymorphonuclear leukocytes seen per low power field    No organisms seen per oil power field    Culture - Fungal, Tissue (collected 10-21-22 @ 12:30)  Source: .Tissue Production of Decortication 1/2    Culture - Acid Fast - Tissue w/Smear (collected 10-21-22 @ 12:30)  Source: .Tissue Production of Decortication 1/2    Culture - Tissue with Gram Stain (collected 10-21-22 @ 12:30)  Source: .Tissue Product of Decortication 1/2  Gram Stain (10-22-22 @ 02:46):    Rare polymorphonuclear leukocytes seen per low power field    No organisms seen per oil power field    Culture - Blood (collected 10-18-22 @ 16:06)  Source: .Blood Blood-Peripheral  Final Report (10-23-22 @ 23:00):    No Growth Final    Culture - Blood (collected 10-18-22 @ 16:03)  Source: .Blood Blood-Peripheral  Final Report (10-23-22 @ 23:00):    No Growth Final    Culture - Fungal, Body Fluid (collected 10-18-22 @ 09:30)  Source: Pleural Fl Pleural Fluid    Culture - Body Fluid with Gram Stain (collected 10-18-22 @ 09:30)  Source: Pleural Fl Pleural Fluid  Gram Stain (10-18-22 @ 23:35):    No polymorphonuclear leukocytes seen per low power field    No organisms seen per oil power field  Final Report (10-23-22 @ 10:07):    No growth at 5 days    Culture - Fungal, Body Fluid (collected 10-12-22 @ 12:02)  Source: Pleural Fl Pleural Fluid    Culture - Body Fluid with Gram Stain (collected 10-12-22 @ 12:02)  Source: Pleural Fl Pleural Fluid  Gram Stain (10-12-22 @ 22:32):    polymorphonuclear leukocytes seen    No organisms seen    by cytocentrifuge  Final Report (10-17-22 @ 11:27):    No growth at 5 days    Culture - Urine (collected 10-12-22 @ 01:20)  Source: Clean Catch Clean Catch (Midstream)  Final Report (10-13-22 @ 11:19):    <10,000 CFU/mL Normal Urogenital Carey    Culture - Blood (collected 10-11-22 @ 21:50)  Source: .Blood Blood-Peripheral  Final Report (10-17-22 @ 04:01):    No Growth Final    Culture - Blood (collected 10-11-22 @ 21:50)  Source: .Blood Blood-Peripheral  Final Report (10-17-22 @ 04:01):    No Growth Final       COVID-19 PCR: NotDetec (10-20-22 @ 04:00)  COVID-19 PCR: NotDetec (10-17-22 @ 13:00)  SARS-CoV-2: NotDetec (10-11-22 @ 21:50)  SARS-CoV-2: NotDetec (10-09-22 @ 10:10)

## 2022-10-24 NOTE — PROGRESS NOTE ADULT - SUBJECTIVE AND OBJECTIVE BOX
POD3 s/p L VATS decortication, evacuation of empyema    Subjective: no complaints denies CP, palpitations, SOB, cough, fever, chills, itchiness/rash, diaphoresis, vision changes, HA, dizziness/lightheadedness, numbness/tingling, abd pain, N/V     T(C): 36.8 (10-23-22 @ 22:23), Max: 37.1 (10-23-22 @ 17:44)  HR: 112 (10-23-22 @ 22:23) (88 - 112)  BP: 124/73 (10-23-22 @ 22:23) (108/66 - 137/72)  RR: 18 (10-23-22 @ 22:23) (18 - 20)  SpO2: 93% (10-23-22 @ 22:23) (92% - 94%)    10-22    136  |  97<L>  |  6.7<L>  ----------------------------<  120<H>  4.1   |  32.0<H>  |  0.94    Ca    9.0      22 Oct 2022 06:50  Mg     2.1     10-22                               10.9   13.17 )-----------( 520      ( 22 Oct 2022 06:50 )             34.6     I&O's Detail    22 Oct 2022 07:01  -  23 Oct 2022 07:00  --------------------------------------------------------  IN:    Oral Fluid: 440 mL  Total IN: 440 mL    OUT:    Chest Tube (mL): 40 mL    Chest Tube (mL): 95 mL  Total OUT: 135 mL  Total NET: 305 mL    23 Oct 2022 07:01  -  24 Oct 2022 03:42  --------------------------------------------------------  IN:    Oral Fluid: 450 mL  Total IN: 450 mL    OUT:    Chest Tube (mL): 30 mL    Chest Tube (mL): 10 mL    Voided (mL): 650 mL  Total OUT: 690 mL  Total NET: -240 mL    Drug Dosing Weight  Height (cm): 182.9 (21 Oct 2022 11:17)  Weight (kg): 88.5 (21 Oct 2022 11:17)  BMI (kg/m2): 26.5 (21 Oct 2022 11:17)  BSA (m2): 2.11 (21 Oct 2022 11:17)    MEDICATIONS  (STANDING):  atropine 1% Solution 1 Drop(s) Both EYES two times a day  cloZAPine 250 milliGRAM(s) Oral at bedtime  enoxaparin Injectable 40 milliGRAM(s) SubCutaneous every 24 hours  lithium 450 milliGRAM(s) Oral two times a day  nicotine -  14 mG/24Hr(s) Patch 1 Patch Transdermal daily  piperacillin/tazobactam IVPB.. 3.375 Gram(s) IV Intermittent every 8 hours  senna 2 Tablet(s) Oral at bedtime  vancomycin  IVPB 1500 milliGRAM(s) IV Intermittent <User Schedule>    MEDICATIONS  (PRN):  acetaminophen     Tablet .. 650 milliGRAM(s) Oral every 6 hours PRN Temp greater or equal to 38C (100.4F), Mild Pain (1 - 3)  aluminum hydroxide/magnesium hydroxide/simethicone Suspension 30 milliLiter(s) Oral every 4 hours PRN Dyspepsia  benzocaine 15 mG/menthol 3.6 mG Lozenge 1 Lozenge Oral every 4 hours PRN Sore Throat  melatonin 3 milliGRAM(s) Oral at bedtime PRN Insomnia  ondansetron Injectable 4 milliGRAM(s) IV Push every 8 hours PRN Nausea and/or Vomiting  oxycodone    5 mG/acetaminophen 325 mG 1 Tablet(s) Oral every 6 hours PRN Severe Pain (7 - 10)    Physical Exam  Gen: NAD  Neuro: A&OX3 non focal speech clear and intact  Pulm: decreased on left clear on right, no wheezing  CV: S1S2 RRR no murmurs  Abd: +BS soft NT ND  Extrem/MS: no edema/cyanosis, HUNTER  skin: warm dry perfused no rashes  Incision(s): L VATS C/D/I with sutures  2CT to waterseal no air leak

## 2022-10-24 NOTE — PROGRESS NOTE ADULT - ASSESSMENT
47M, pmhx schizoaffective disorder, bipolar, smoker, current [facility] resident, p/w left sided chest/abd pain, noted to have fever and leukocytosis, CT with large loculated left effusion, pigtail placed 10/12 and d/c'd 10/16, pt with reaccumulated fluid requiring pigtail again 10/18, now s/p L VATS decortication, evacuation of empyema 10/21 with Dr. Abreu;

## 2022-10-24 NOTE — PROGRESS NOTE ADULT - PROBLEM SELECTOR PLAN 1
S/P L VATS 10/21  oob, ambulate as tolerated  percocet PRN pain   encourage chest PT, Incentive Spirometry use, deep breathing and coughing  CT to [facility] 10/23 without issue, d/c one tube today   tolerating diet, cont bowel regimen  cont home psych meds  psych consult appreciated  f/u OR cultures--> prelim NG  cont vanco/zosyn, will need ID consult and determine course of antibiotics  cont supportive care  plan to be d/w thoracic surgery attending estephania pineda

## 2022-10-24 NOTE — CONSULT NOTE ADULT - ASSESSMENT
48 yo male with hx of Schizoaffective disorder, bipolar and tobacco abuse presents with complaints of left sided pain and shortness of breath. Patient reported 4 day  history of body aches with poor appetite.  Patient was found to be tachycardiac up to 140s at [place] and was therefore sent in for an evaluation. Patient endorsed subjective fevers associated with a dry cough while at [place]. While in the ED, cxr and CT chest was obtained which showed a large loculated left pleural effusion. now s/p left VATS/decortication 10/21/22      - s/p Left VATS/decortication 10/21/22  - pleural fluid and tissue CX negative so far  - Continue zosyn  - c/w vancomycin, monitor trough  - chest tube remains in place  - check mrsa screen  - Trend Fever  - Trend Leukocytosis      Will Follow

## 2022-10-24 NOTE — CHART NOTE - NSCHARTNOTEFT_GEN_A_CORE
Source: Patient [ ]  Family [ ]   other [ x] RN    Current Diet: Diet, Regular:   Supplement Feeding Modality:  Oral  Ensure Enlive Cans or Servings Per Day:  1       Frequency:  Three Times a day (10-23-22 @ 08:56)    PO intake:  50% [x ]   50-75%  [ ]   %  [ ]  other :    Current Weight:   (10/24) 191.5 lbs  (10/22) 231.5 lbs  (10/18) 217.5 lbs    % Weight Change - wt fluctuation noted, will continue to monitor for accuracy. No edema noted.     Pertinent Medications: MEDICATIONS  (STANDING):  atropine 1% Solution 1 Drop(s) Both EYES two times a day  cloZAPine 250 milliGRAM(s) Oral at bedtime  enoxaparin Injectable 40 milliGRAM(s) SubCutaneous every 24 hours  lithium 450 milliGRAM(s) Oral two times a day  nicotine -  14 mG/24Hr(s) Patch 1 Patch Transdermal daily  piperacillin/tazobactam IVPB.. 3.375 Gram(s) IV Intermittent every 8 hours  senna 2 Tablet(s) Oral at bedtime  vancomycin  IVPB 1500 milliGRAM(s) IV Intermittent <User Schedule>    MEDICATIONS  (PRN):  acetaminophen     Tablet .. 650 milliGRAM(s) Oral every 6 hours PRN Temp greater or equal to 38C (100.4F), Mild Pain (1 - 3)  aluminum hydroxide/magnesium hydroxide/simethicone Suspension 30 milliLiter(s) Oral every 4 hours PRN Dyspepsia  benzocaine 15 mG/menthol 3.6 mG Lozenge 1 Lozenge Oral every 4 hours PRN Sore Throat  melatonin 3 milliGRAM(s) Oral at bedtime PRN Insomnia  ondansetron Injectable 4 milliGRAM(s) IV Push every 8 hours PRN Nausea and/or Vomiting  oxycodone    5 mG/acetaminophen 325 mG 1 Tablet(s) Oral every 6 hours PRN Severe Pain (7 - 10)    Pertinent Labs: CBC Full  -  ( 24 Oct 2022 07:23 )  WBC Count : 13.16 K/uL  RBC Count : 3.22 M/uL  Hemoglobin : 9.5 g/dL  Hematocrit : 30.1 %  Platelet Count - Automated : 505 K/uL  Mean Cell Volume : 93.5 fl  Mean Cell Hemoglobin : 29.5 pg  Mean Cell Hemoglobin Concentration : 31.6 gm/dL  10-24 Na137 mmol/L Glu 118 mg/dL<H> K+ 4.1 mmol/L Cr  0.95 mg/dL BUN 11.1 mg/dL Phos n/a   Alb n/a   PAB n/a       Skin: no skin breakdown noted     Nutrition focused physical exam previously conducted - found signs of malnutrition [ ]absent [x ]present    Subcutaneous fat loss: [x ] Orbital fat pads region, [ ]Buccal fat region, [ ]Triceps region,  [ ]Ribs region    Muscle wasting: [x ]Temples region, [x ]Clavicle region, [ ]Shoulder region, [ ]Scapula region, [ ]Interosseous region,  [ ]thigh region, [ ]Calf region    Current Nutrition Diagnosis: Pt remains at nutrition risk secondary to moderate protein calorie malnutrition (chronic) related to inability to meet sufficient protein-energy intake with persistent lack of appetite in setting of pleural effusion as evidenced by pt meeting <75% estimated energy needs >1 month, mild muscle wasting/fat loss and unintentional wt loss prior to admission.  Pt sleeping this morning as he was awake overnight per RN.  Breakfast was not consumed this morning.  Pt continues with suboptimal po intake at meals noted.  RD to remain available.     Recommendations:   1. Continue with Ensure TID  2. RX: MVI daily   3. Obtain daily weight and monitor trend     Monitoring and Evaluation:   [x ] PO intake [x ] Tolerance to diet prescription [X] Weights  [X] Follow up per protocol [X] Labs:

## 2022-10-25 LAB
ANION GAP SERPL CALC-SCNC: 9 MMOL/L — SIGNIFICANT CHANGE UP (ref 5–17)
BUN SERPL-MCNC: 7.1 MG/DL — LOW (ref 8–20)
CALCIUM SERPL-MCNC: 9.8 MG/DL — SIGNIFICANT CHANGE UP (ref 8.4–10.5)
CHLORIDE SERPL-SCNC: 100 MMOL/L — SIGNIFICANT CHANGE UP (ref 98–107)
CO2 SERPL-SCNC: 29 MMOL/L — SIGNIFICANT CHANGE UP (ref 22–29)
CREAT SERPL-MCNC: 0.99 MG/DL — SIGNIFICANT CHANGE UP (ref 0.5–1.3)
EGFR: 96 ML/MIN/1.73M2 — SIGNIFICANT CHANGE UP
GLUCOSE SERPL-MCNC: 110 MG/DL — HIGH (ref 70–99)
HCT VFR BLD CALC: 30.8 % — LOW (ref 39–50)
HGB BLD-MCNC: 9.7 G/DL — LOW (ref 13–17)
MAGNESIUM SERPL-MCNC: 2.1 MG/DL — SIGNIFICANT CHANGE UP (ref 1.6–2.6)
MCHC RBC-ENTMCNC: 29.4 PG — SIGNIFICANT CHANGE UP (ref 27–34)
MCHC RBC-ENTMCNC: 31.5 GM/DL — LOW (ref 32–36)
MCV RBC AUTO: 93.3 FL — SIGNIFICANT CHANGE UP (ref 80–100)
MRSA PCR RESULT.: SIGNIFICANT CHANGE UP
PLATELET # BLD AUTO: 532 K/UL — HIGH (ref 150–400)
POTASSIUM SERPL-MCNC: 4.2 MMOL/L — SIGNIFICANT CHANGE UP (ref 3.5–5.3)
POTASSIUM SERPL-SCNC: 4.2 MMOL/L — SIGNIFICANT CHANGE UP (ref 3.5–5.3)
RBC # BLD: 3.3 M/UL — LOW (ref 4.2–5.8)
RBC # FLD: 14 % — SIGNIFICANT CHANGE UP (ref 10.3–14.5)
S AUREUS DNA NOSE QL NAA+PROBE: SIGNIFICANT CHANGE UP
SARS-COV-2 RNA SPEC QL NAA+PROBE: SIGNIFICANT CHANGE UP
SODIUM SERPL-SCNC: 138 MMOL/L — SIGNIFICANT CHANGE UP (ref 135–145)
WBC # BLD: 12.68 K/UL — HIGH (ref 3.8–10.5)
WBC # FLD AUTO: 12.68 K/UL — HIGH (ref 3.8–10.5)

## 2022-10-25 PROCEDURE — 99024 POSTOP FOLLOW-UP VISIT: CPT

## 2022-10-25 PROCEDURE — 99231 SBSQ HOSP IP/OBS SF/LOW 25: CPT

## 2022-10-25 PROCEDURE — 71045 X-RAY EXAM CHEST 1 VIEW: CPT | Mod: 26,76

## 2022-10-25 RX ADMIN — Medication 1 PATCH: at 12:31

## 2022-10-25 RX ADMIN — PIPERACILLIN AND TAZOBACTAM 25 GRAM(S): 4; .5 INJECTION, POWDER, LYOPHILIZED, FOR SOLUTION INTRAVENOUS at 14:42

## 2022-10-25 RX ADMIN — LITHIUM CARBONATE 450 MILLIGRAM(S): 300 TABLET, EXTENDED RELEASE ORAL at 05:43

## 2022-10-25 RX ADMIN — LITHIUM CARBONATE 450 MILLIGRAM(S): 300 TABLET, EXTENDED RELEASE ORAL at 17:37

## 2022-10-25 RX ADMIN — ENOXAPARIN SODIUM 40 MILLIGRAM(S): 100 INJECTION SUBCUTANEOUS at 21:16

## 2022-10-25 RX ADMIN — PIPERACILLIN AND TAZOBACTAM 25 GRAM(S): 4; .5 INJECTION, POWDER, LYOPHILIZED, FOR SOLUTION INTRAVENOUS at 05:43

## 2022-10-25 RX ADMIN — PIPERACILLIN AND TAZOBACTAM 25 GRAM(S): 4; .5 INJECTION, POWDER, LYOPHILIZED, FOR SOLUTION INTRAVENOUS at 21:15

## 2022-10-25 RX ADMIN — Medication 166.67 MILLIGRAM(S): at 12:31

## 2022-10-25 RX ADMIN — Medication 1 PATCH: at 19:20

## 2022-10-25 RX ADMIN — Medication 1 DROP(S): at 05:56

## 2022-10-25 RX ADMIN — CLOZAPINE 250 MILLIGRAM(S): 150 TABLET, ORALLY DISINTEGRATING ORAL at 21:16

## 2022-10-25 NOTE — PROGRESS NOTE ADULT - ASSESSMENT
48 yo male with hx of Schizoaffective disorder, bipolar and tobacco abuse presents with complaints of left sided pain and shortness of breath. Patient reported 4 day  history of body aches with poor appetite.  Patient was found to be tachycardiac up to 140s at [place] and was therefore sent in for an evaluation. Patient endorsed subjective fevers associated with a dry cough while at [place]. While in the ED, cxr and CT chest was obtained which showed a large loculated left pleural effusion. now s/p left VATS/decortication 10/21/22    - afebrile. wbc downtrending. both chest tubes removed. pt stable on RA  - s/p Left VATS/decortication 10/21/22  - pleural fluid and tissue CX negative so far  - Continue zosyn 10/12  - dc vanco  - mrsa screen (-)  - suggest oral augmentin 875/125mg q12h through 11/18/22  - Trend Fever  - Trend Leukocytosis      outpatient ID f/u 3-4 weeks    d/w team  please call with questions

## 2022-10-25 NOTE — PROGRESS NOTE ADULT - PROBLEM SELECTOR PLAN 1
S/P L VATS 10/21  oob, ambulate as tolerated  percocet PRN pain   encourage chest PT, Incentive Spirometry use, deep breathing and coughing  LT CT x2 removed.   tolerating diet, cont bowel regimen  cont home psych meds  psych consult appreciated  f/u OR cultures--> prelim NG  cont vanco/zosyn,   ID consult appreciated.  Pending further input regarding abx for discharge process.  [facility]

## 2022-10-25 NOTE — PROGRESS NOTE ADULT - ASSESSMENT
47M, pmhx schizoaffective disorder, bipolar, smoker, current [place] resident, p/w left sided chest/abd pain, noted to have fever and leukocytosis, CT with large loculated left effusion, pigtail placed 10/12 and d/c'd 10/16, pt with reaccumulated fluid requiring pigtail again 10/18, now s/p L VATS decortication, evacuation of empyema 10/21 with Dr. Abreu;

## 2022-10-25 NOTE — PROGRESS NOTE ADULT - SUBJECTIVE AND OBJECTIVE BOX
Subjective - patient seen and evaluated bedside. Sitting comfortably in bed. Denies CP, SOB, HA, dizziness, n/v/d    Review of Systems: negative x 10 systems except as noted above    Brief summary:  47yMale POD# 4 LT VATS/[place] for empyema    Significant/[id number] events: LT CTx2 removed. Post removal CXR without pneumothorax      PAST MEDICAL & SURGICAL HISTORY:  Schizoaffective disorder      Bipolar disorder      History of tobacco abuse      S/P tonsillectomy            acetaminophen     Tablet .. 650 milliGRAM(s) Oral every 6 hours PRN  aluminum hydroxide/magnesium hydroxide/simethicone Suspension 30 milliLiter(s) Oral every 4 hours PRN  atropine 1% Solution 1 Drop(s) Both EYES two times a day  benzocaine 15 mG/menthol 3.6 mG Lozenge 1 Lozenge Oral every 4 hours PRN  cloZAPine 250 milliGRAM(s) Oral at bedtime  enoxaparin Injectable 40 milliGRAM(s) SubCutaneous every 24 hours  lithium 450 milliGRAM(s) Oral two times a day  melatonin 3 milliGRAM(s) Oral at bedtime PRN  nicotine -  14 mG/24Hr(s) Patch 1 Patch Transdermal daily  ondansetron Injectable 4 milliGRAM(s) IV Push every 8 hours PRN  oxycodone    5 mG/acetaminophen 325 mG 1 Tablet(s) Oral every 6 hours PRN  piperacillin/tazobactam IVPB.. 3.375 Gram(s) IV Intermittent every 8 hours  senna 2 Tablet(s) Oral at bedtime  vancomycin  IVPB 1250 milliGRAM(s) IV Intermittent every 12 hours  MEDICATIONS  (PRN):  acetaminophen     Tablet .. 650 milliGRAM(s) Oral every 6 hours PRN Temp greater or equal to 38C (100.4F), Mild Pain (1 - 3)  aluminum hydroxide/magnesium hydroxide/simethicone Suspension 30 milliLiter(s) Oral every 4 hours PRN Dyspepsia  benzocaine 15 mG/menthol 3.6 mG Lozenge 1 Lozenge Oral every 4 hours PRN Sore Throat  melatonin 3 milliGRAM(s) Oral at bedtime PRN Insomnia  ondansetron Injectable 4 milliGRAM(s) IV Push every 8 hours PRN Nausea and/or Vomiting  oxycodone    5 mG/acetaminophen 325 mG 1 Tablet(s) Oral every 6 hours PRN Severe Pain (7 - 10)      Daily     Daily Weight in k.1 (25 Oct 2022 06:02)                              9.7    12.68 )-----------( 532      ( 25 Oct 2022 06:25 )             30.8   10-25    138  |  100  |  7.1<L>  ----------------------------<  110<H>  4.2   |  29.0  |  0.99    Ca    9.8      25 Oct 2022 06:25  Mg     2.1     10-25              Objective:  T(C): 36.8 (10-25-22 @ 08:11), Max: 36.8 (10-25-22 @ 08:11)  HR: 98 (10-25-22 @ 08:11) (83 - 108)  BP: 123/71 (10-25-22 @ 08:11) (106/67 - 123/71)  RR: 17 (10-25-22 @ 08:11) (17 - 18)  SpO2: 94% (10-25-22 @ 08:11) (93% - 98%)  Wt(kg): --CAPILLARY BLOOD GLUCOSE      I&O's Summary    24 Oct 2022 07:01  -  25 Oct 2022 07:00  --------------------------------------------------------  IN: 0 mL / OUT: 2540 mL / NET: -2540 mL        Physical Exam  General: NAD  Neuro: A+O x 3, non-focal, speech clear and intact  Psych: Appropriate affect  HEENT:  NCAT, No conjuctival edema or icterus, no thrush.  Pulm: CTA, equal bilaterally  CV: RRR, irregularly irregular, +S1S2  Abd: soft, NT, ND, +BS  Ext: +DP Pulses b/l, no edema  Skin: Warm, dry, intact  Inc: MSI C/D/I/stable w/ dressing, LE vein harvest site C/D/I with Ace wrap  Chest tubes:        Imaging:    < from: Xray Chest 1 View- PORTABLE-Urgent (Xray Chest 1 View- PORTABLE-Urgent .) (10.18.22 @ 22:27) >  CATHETERS AND TUBES: LEFT chest tube overlies mid zone.        PULMONARY: Multiloculated fluid collections largest in the LEFT upper   zone adjacent to the mediastinum.  LEFT chest tube in place. RIGHT basilar airspace consolidation and/or   loculated fluid obscuring diaphragm contour.  RIGHT lung clear.  No pneumothorax.    HEART/VASCULAR: The heart and mediastinum size and configuration are   within normal limits.    BONES: Visualized osseous structures are intact.    IMPRESSION:   Multi Loculated LEFT lung effusions and/or basilar airspace   consolidations of. LEFT chest tube in place. No pneumothorax..  ------------------------------------------  FOLLOW-UP AP PORTABLE CHEST RADIOGRAPH 10/18/2022 AT 10:02 PM:    INDICATION: LEFT chest tube placement.    FINDINGS: LEFT chest tube retracted with distal tip in the periphery of   LEFT mid zone otherwise no interval change.    < end of copied text >    Recent CXRs pending radiology read. Chest tubes removed. No evidence of pneumothorax

## 2022-10-26 ENCOUNTER — TRANSCRIPTION ENCOUNTER (OUTPATIENT)
Age: 47
End: 2022-10-26

## 2022-10-26 VITALS
TEMPERATURE: 98 F | SYSTOLIC BLOOD PRESSURE: 120 MMHG | RESPIRATION RATE: 18 BRPM | HEART RATE: 92 BPM | OXYGEN SATURATION: 98 % | DIASTOLIC BLOOD PRESSURE: 66 MMHG

## 2022-10-26 LAB
ANION GAP SERPL CALC-SCNC: 11 MMOL/L — SIGNIFICANT CHANGE UP (ref 5–17)
BUN SERPL-MCNC: 7.7 MG/DL — LOW (ref 8–20)
CALCIUM SERPL-MCNC: 9.4 MG/DL — SIGNIFICANT CHANGE UP (ref 8.4–10.5)
CHLORIDE SERPL-SCNC: 99 MMOL/L — SIGNIFICANT CHANGE UP (ref 96–108)
CO2 SERPL-SCNC: 27 MMOL/L — SIGNIFICANT CHANGE UP (ref 22–29)
CREAT SERPL-MCNC: 0.93 MG/DL — SIGNIFICANT CHANGE UP (ref 0.5–1.3)
CULTURE RESULTS: SIGNIFICANT CHANGE UP
CULTURE RESULTS: SIGNIFICANT CHANGE UP
EGFR: 103 ML/MIN/1.73M2 — SIGNIFICANT CHANGE UP
GLUCOSE SERPL-MCNC: 131 MG/DL — HIGH (ref 70–99)
HCT VFR BLD CALC: 31.2 % — LOW (ref 39–50)
HGB BLD-MCNC: 9.9 G/DL — LOW (ref 13–17)
MAGNESIUM SERPL-MCNC: 2 MG/DL — SIGNIFICANT CHANGE UP (ref 1.8–2.6)
MCHC RBC-ENTMCNC: 29.3 PG — SIGNIFICANT CHANGE UP (ref 27–34)
MCHC RBC-ENTMCNC: 31.7 GM/DL — LOW (ref 32–36)
MCV RBC AUTO: 92.3 FL — SIGNIFICANT CHANGE UP (ref 80–100)
PLATELET # BLD AUTO: 569 K/UL — HIGH (ref 150–400)
POTASSIUM SERPL-MCNC: 4.1 MMOL/L — SIGNIFICANT CHANGE UP (ref 3.5–5.3)
POTASSIUM SERPL-SCNC: 4.1 MMOL/L — SIGNIFICANT CHANGE UP (ref 3.5–5.3)
RBC # BLD: 3.38 M/UL — LOW (ref 4.2–5.8)
RBC # FLD: 13.9 % — SIGNIFICANT CHANGE UP (ref 10.3–14.5)
SODIUM SERPL-SCNC: 137 MMOL/L — SIGNIFICANT CHANGE UP (ref 135–145)
SPECIMEN SOURCE: SIGNIFICANT CHANGE UP
SPECIMEN SOURCE: SIGNIFICANT CHANGE UP
SURGICAL PATHOLOGY STUDY: SIGNIFICANT CHANGE UP
VANCOMYCIN TROUGH SERPL-MCNC: 11.3 UG/ML — SIGNIFICANT CHANGE UP (ref 10–20)
WBC # BLD: 12.27 K/UL — HIGH (ref 3.8–10.5)
WBC # FLD AUTO: 12.27 K/UL — HIGH (ref 3.8–10.5)

## 2022-10-26 PROCEDURE — 71045 X-RAY EXAM CHEST 1 VIEW: CPT | Mod: 26

## 2022-10-26 PROCEDURE — 99024 POSTOP FOLLOW-UP VISIT: CPT

## 2022-10-26 RX ORDER — ACETAMINOPHEN 500 MG
2 TABLET ORAL
Qty: 0 | Refills: 0 | DISCHARGE
Start: 2022-10-26

## 2022-10-26 RX ADMIN — PIPERACILLIN AND TAZOBACTAM 25 GRAM(S): 4; .5 INJECTION, POWDER, LYOPHILIZED, FOR SOLUTION INTRAVENOUS at 05:01

## 2022-10-26 RX ADMIN — LITHIUM CARBONATE 450 MILLIGRAM(S): 300 TABLET, EXTENDED RELEASE ORAL at 05:01

## 2022-10-26 NOTE — DISCHARGE NOTE PROVIDER - NSDCFUADDINST_GEN_ALL_CORE_FT
Please call the Cardiothoracic Surgery office at [phone] if you are experiencing any shortness of breath, chest pain, fevers or chills, drainage from the incisions, persistent nausea, vomiting or if you have any questions about your medications. If the symptoms are severe, call 911 and go to the nearest hospital.

## 2022-10-26 NOTE — DISCHARGE NOTE PROVIDER - PROVIDER TOKENS
PROVIDER:[TOKEN:[[id number]],FOLLOWUP:[2 weeks]],PROVIDER:[TOKEN:[[id number]],FOLLOWUP:[1 month]]

## 2022-10-26 NOTE — DISCHARGE NOTE PROVIDER - HOSPITAL COURSE
47M [facility] resident with PMH schizoaffective disorder, bipolar disorder, tobacco abuse,presented to [facility] on 10/11 complaining of chest discomfort, cough found to have large left loculated pleural effusion, leukocytosis. PTC placed on 10/12 for drainage of effusion; removed 10/16. Reaccumulated on subsequent CXR. Surgical left chest tube placed on 10/18 with suspicion of empyema. Patient then underwent  LT VATS, decortication, washout of empyema on 10/21. Postop course uncomplicated. Chest tubes removed on 10/24 and 10/25. Microbiology thus far negative. Receiving IV zosyn in house. Plan to transition to PO augmentin x3 weeks upon discharge as per infectious disease    Constitutional: NAD, well developed,  Neuro: A+O x 3, non-focal, speech clear and intact  HEENT: NC/AT, PERRL, EOMI, anicteric sclerae, oral mucosa pink and moist  Neck: supple, no JVD  CV: regular rate, regular rhythm, +S1S2, no murmurs or rub  Pulm/chest: lung sounds CTA on right, diminished on left, no accessory muscle use noted. VATS incision sites c/d/i, no evidence of infection, bleeding or wound dehissance.   Abd: soft, NT, ND, +BS  Ext: HUNTER x 4, no C/C/E  Skin: warm, well perfused  Psych: calm, appropriate affect

## 2022-10-26 NOTE — PROGRESS NOTE ADULT - PROVIDER SPECIALTY LIST ADULT
Thoracic Surgery
Thoracic Surgery
Anesthesia
Hospitalist
Infectious Disease
Thoracic Surgery
Hospitalist
Infectious Disease
Thoracic Surgery
Hospitalist
Thoracic Surgery

## 2022-10-26 NOTE — DISCHARGE NOTE PROVIDER - NSDCFUADDAPPT_GEN_ALL_CORE_FT
Please call Dr. Abreu's office at [phone] tomorrow or the next business day to make a followup appointment in 2 weeks.    Please follow up with Dr. Garcia (Infectious disease) in 3-4 weeks.

## 2022-10-26 NOTE — PROGRESS NOTE ADULT - PROBLEM SELECTOR PLAN 1
S/P L VATS 10/21  oob, ambulate as tolerated  percocet PRN pain   encourage chest PT, Incentive Spirometry use, deep breathing and coughing  LT CT x2 removed.   tolerating diet, cont bowel regimen  cont home psych meds  psych consult appreciated  f/u OR cultures--> prelim NG  Continue zosyn. To be transitioned to augmentin x3 weeks upon discharge per ID.  Pending bed at [facility].  D/w Dr. Abreu.  ID consult appreciated.  Pending further input regarding abx for discharge process.  Plan to discharge back to [facility] once bed available.

## 2022-10-26 NOTE — DISCHARGE NOTE NURSING/CASE MANAGEMENT/SOCIAL WORK - NSDCFUADDAPPT_GEN_ALL_CORE_FT
Please call Dr. Abreu's office at [phone] tomorrow or the next business day to make a followup appointment in 2 weeks.    Please follow up with Dr. Garcia (Infectious disease) in 3-4 weeks.

## 2022-10-26 NOTE — PROGRESS NOTE ADULT - NUTRITIONAL ASSESSMENT
This patient has been assessed with a concern for Malnutrition and has been determined to have a diagnosis/diagnoses of Moderate protein-calorie malnutrition.    This patient is being managed with:   Diet NPO-  NPO for Procedure/Test     NPO Start Date: 20-Oct-2022   NPO Start Time: 23:59  Entered: Oct 19 2022  7:53PM    Diet Regular-  Entered: Oct 12 2022  8:47AM    
This patient has been assessed with a concern for Malnutrition and has been determined to have a diagnosis/diagnoses of Moderate protein-calorie malnutrition.    This patient is being managed with:   Diet Regular-  Entered: Oct 21 2022  1:52PM    
This patient has been assessed with a concern for Malnutrition and has been determined to have a diagnosis/diagnoses of Moderate protein-calorie malnutrition.    This patient is being managed with:   Diet Regular-  Supplement Feeding Modality:  Oral  Ensure Enlive Cans or Servings Per Day:  1       Frequency:  Three Times a day  Entered: Oct 23 2022  8:56AM    
This patient has been assessed with a concern for Malnutrition and has been determined to have a diagnosis/diagnoses of Moderate protein-calorie malnutrition.    This patient is being managed with:   Diet NPO-  NPO for Procedure/Test     NPO Start Date: 20-Oct-2022   NPO Start Time: 23:59  Entered: Oct 19 2022  7:53PM    Diet Regular-  Entered: Oct 12 2022  8:47AM    
This patient has been assessed with a concern for Malnutrition and has been determined to have a diagnosis/diagnoses of Moderate protein-calorie malnutrition.    This patient is being managed with:   Diet Regular-  Entered: Oct 21 2022  1:52PM    
This patient has been assessed with a concern for Malnutrition and has been determined to have a diagnosis/diagnoses of Moderate protein-calorie malnutrition.    This patient is being managed with:   Diet Regular-  Supplement Feeding Modality:  Oral  Ensure Enlive Cans or Servings Per Day:  1       Frequency:  Three Times a day  Entered: Oct 23 2022  8:56AM    
This patient has been assessed with a concern for Malnutrition and has been determined to have a diagnosis/diagnoses of Moderate protein-calorie malnutrition.    This patient is being managed with:   Diet NPO-  NPO for Procedure/Test     NPO Start Date: 20-Oct-2022   NPO Start Time: 23:59  Entered: Oct 19 2022  7:53PM    Diet Regular-  Entered: Oct 12 2022  8:47AM    
This patient has been assessed with a concern for Malnutrition and has been determined to have a diagnosis/diagnoses of Moderate protein-calorie malnutrition.    This patient is being managed with:   Diet Regular-  Supplement Feeding Modality:  Oral  Ensure Enlive Cans or Servings Per Day:  1       Frequency:  Three Times a day  Entered: Oct 23 2022  8:56AM    

## 2022-10-26 NOTE — DISCHARGE NOTE PROVIDER - NSDCCPTREATMENT_GEN_ALL_CORE_FT
PRINCIPAL PROCEDURE  Procedure: Bronchoscopy with decortication of left lung using video-assisted thoracoscopic surgery (VATS)  Findings and Treatment: bronchoscopy, left vats, total decortication, Intercostal nerve block

## 2022-10-26 NOTE — DISCHARGE NOTE PROVIDER - DETAILS OF MALNUTRITION DIAGNOSIS/DIAGNOSES
This patient has been assessed with a concern for Malnutrition and was treated during this hospitalization for the following Nutrition diagnosis/diagnoses:     -  10/19/2022: Moderate protein-calorie malnutrition

## 2022-10-26 NOTE — DISCHARGE NOTE PROVIDER - CARE PROVIDER_API CALL
Grady Abreu)  Surgery; Thoracic Surgery  [address] [place] [address]  Phone: [phone]  Fax: [phone]  Follow Up Time: 2 weeks    Hillary Garcia)  Internal Medicine  [address] [place] [address]  Phone: [phone]  Fax: [phone]  Follow Up Time: 1 month

## 2022-10-26 NOTE — DISCHARGE NOTE PROVIDER - NSDCCPCAREPLAN_GEN_ALL_CORE_FT
PRINCIPAL DISCHARGE DIAGNOSIS  Diagnosis: Pleural effusion, left  Assessment and Plan of Treatment: Please call Dr. Abreu's office at [phone] tomorrow or the next business day to make a followup appointment.   Leave dressing intact until tomorrow evening, reinforcing with tape if necessary (36-48 hours from chest tube removal). At that time you may remove the dressing and take a shower. Place clean gauze over wound if continual drainage. No ointments or lotions on the incision.   Take all medications as ordered. Continue a stool softener as needed. Continue to use your incentive spirometer and increase your activity as tolerated. No baths or swimming. You may not return to work or drive until cleared by surgeon. Call the office if you experience any fevers, shortness of breath, chest pain or excessive drainage from the incision, day or night. Go to the emergency room if any of these symptoms are severe.       PRINCIPAL DISCHARGE DIAGNOSIS  Diagnosis: Pleural effusion, left  Assessment and Plan of Treatment: Please call Dr. Abreu's office at [phone] tomorrow or the next business day to make a followup appointment.   Leave dressing intact until tomorrow evening, reinforcing with tape if necessary (36-48 hours from chest tube removal). At that time you may remove the dressing and take a shower. Place clean gauze over wound if continual drainage. No ointments or lotions on the incision.   Take all medications as ordered. Continue a stool softener as needed. Continue to use your incentive spirometer and increase your activity as tolerated. No baths or swimming. You may not return to work or drive until cleared by surgeon. Call the office if you experience any fevers, shortness of breath, chest pain or excessive drainage from the incision, day or night. Go to the emergency room if any of these symptoms are severe.  PATIENT TO BE DISCHARGED TO [facility]

## 2022-10-26 NOTE — PROGRESS NOTE ADULT - SUBJECTIVE AND OBJECTIVE BOX
Subjective - patient seen and evaluated bedside. Sitting comfortably in bed. Denies CP, SOB, HA, dizziness, n/v/d    Review of Systems: negative x 10 systems except as noted above    Brief summary:  47yMale POD# 5 LT VATS/[place] for empyema    Significant/[id number] events: Last CT removed, pending placement at [place].       PAST MEDICAL & SURGICAL HISTORY:  Schizoaffective disorder      Bipolar disorder      History of tobacco abuse      S/P tonsillectomy            acetaminophen     Tablet .. 650 milliGRAM(s) Oral every 6 hours PRN  aluminum hydroxide/magnesium hydroxide/simethicone Suspension 30 milliLiter(s) Oral every 4 hours PRN  atropine 1% Solution 1 Drop(s) Both EYES two times a day  benzocaine 15 mG/menthol 3.6 mG Lozenge 1 Lozenge Oral every 4 hours PRN  cloZAPine 250 milliGRAM(s) Oral at bedtime  enoxaparin Injectable 40 milliGRAM(s) SubCutaneous every 24 hours  lithium 450 milliGRAM(s) Oral two times a day  melatonin 3 milliGRAM(s) Oral at bedtime PRN  nicotine -  14 mG/24Hr(s) Patch 1 Patch Transdermal daily  ondansetron Injectable 4 milliGRAM(s) IV Push every 8 hours PRN  oxycodone    5 mG/acetaminophen 325 mG 1 Tablet(s) Oral every 6 hours PRN  piperacillin/tazobactam IVPB.. 3.375 Gram(s) IV Intermittent every 8 hours  senna 2 Tablet(s) Oral at bedtime  MEDICATIONS  (PRN):  acetaminophen     Tablet .. 650 milliGRAM(s) Oral every 6 hours PRN Temp greater or equal to 38C (100.4F), Mild Pain (1 - 3)  aluminum hydroxide/magnesium hydroxide/simethicone Suspension 30 milliLiter(s) Oral every 4 hours PRN Dyspepsia  benzocaine 15 mG/menthol 3.6 mG Lozenge 1 Lozenge Oral every 4 hours PRN Sore Throat  melatonin 3 milliGRAM(s) Oral at bedtime PRN Insomnia  ondansetron Injectable 4 milliGRAM(s) IV Push every 8 hours PRN Nausea and/or Vomiting  oxycodone    5 mG/acetaminophen 325 mG 1 Tablet(s) Oral every 6 hours PRN Severe Pain (7 - 10)      Daily     Daily Weight in k.5 (26 Oct 2022 06:53)                              9.9    12.27 )-----------( 569      ( 26 Oct 2022 05:29 )             31.2   10-26    137  |  99  |  7.7<L>  ----------------------------<  131<H>  4.1   |  27.0  |  0.93    Ca    9.4      26 Oct 2022 05:29  Mg     2.0     10-26              Objective:  T(C): 36.9 (10-26-22 @ 08:30), Max: 36.9 (10-26-22 @ 08:30)  HR: 96 (10-26-22 @ 08:30) (96 - 102)  BP: 116/77 (10-26-22 @ 08:30) (104/70 - 132/81)  RR: 16 (10-26-22 @ 08:30) (16 - 18)  SpO2: 96% (10-26-22 @ 08:30) (94% - 98%)  Wt(kg): --CAPILLARY BLOOD GLUCOSE      I&O's Summary    25 Oct 2022 07:01  -  26 Oct 2022 07:00  --------------------------------------------------------  IN: 390 mL / OUT: 750 mL / NET: -360 mL        Physical Exam  General: NAD  Neuro: A+O x 3, non-focal, speech clear and intact  Psych: Appropriate affect  HEENT:  NCAT, No conjuctival edema or icterus, no thrush.  Pulm: CTA, equal bilaterally  CV: RRR,  +S1S2  Abd: soft, NT, ND, +BS  Ext: +DP Pulses b/l, no edema  Skin: Warm, dry, intact  Inc: VATS incision sites c/d/i, no evidence of wound dehissance or infection or bleeding. prolene sutures in place.          Imaging:  < from: Xray Chest 1 View- PORTABLE-Urgent (Xray Chest 1 View- PORTABLE-Urgent .) (10.25.22 @ 11:48) >  INTERPRETATION:    The right lung remains clear.  The leftchest tubes are not significantly changed in position.  A loculated left pleural effusion is again noted. This appears   redistributed. The superior-medial component is smaller but there is a   larger superior lateral component.  The previous superior lateral small left pneumothorax is not seen. There   are likely increased bubbles of gas in the pleural fluid at the left   base. Left subcutaneous emphysema is not significantly changed.  Continued likely left basilar subsegmental atelectasis.    AP portable chest x-ray from 2022 at 4:06 AM:    CLINICAL INFORMATION: Status post VATS for empyema.    < end of copied text >  < from: Xray Chest 1 View- PORTABLE-Urgent (Xray Chest 1 View- PORTABLE-Urgent .) (10.25. @ 11:48) >    INTERPRETATION:    The left chest tube has been removed.  A loculated left pleural effusion with likely associated passive  atelectasis is not significantly changed.  No significant interval change in bubbles of gas associated with the   pleural fluid at the left base. Likely associated passive atelectasis.  The right lung remains clear.  Subcutaneous emphysema is not significantly changed.    IMPRESSION:  Loculated left pleural effusion with likely associated   passive atelectasis, not significantly changed on the most recent image.   Bubbles of gas associated with the pleural fluid at the left base and   likely associated passive atelectasis are also not significantly changed.    Small amount of left subcutaneous emphysema, not significantly changed on   that image.    --- End of Report ---    < end of copied text >

## 2022-10-26 NOTE — PROGRESS NOTE ADULT - REASON FOR ADMISSION
Large Left loculated pleural effusion

## 2022-10-26 NOTE — DISCHARGE NOTE PROVIDER - NSDCMRMEDTOKEN_GEN_ALL_CORE_FT
acetaminophen 325 mg oral tablet: 2 tab(s) orally every 6 hours, As needed, Temp greater or equal to 38C (100.4F), Mild Pain (1 - 3)  aluminum hydroxide-magnesium hydroxide 200 mg-200 mg/5 mL oral suspension: 30 milliliter(s) orally every 4 hours, As needed, Dyspepsia  amoxicillin-clavulanate 875 mg-125 mg oral tablet: 1 tab(s) orally 2 times a day   ARIPiprazole 400 mg intramuscular injection, extended release: 400 milligram(s) intramuscular every 28 days  atropine 1% ophthalmic solution: 2 drop(s) to each affected eye 2 times a day  clonazePAM 1 mg oral tablet: 1 tab(s) orally 3 times a day  cloZAPine: 250 milligram(s) orally once a day (at bedtime)  docusate sodium 100 mg oral tablet: 1 tab(s) orally 3 times a day, As Needed  lithium 150 mg oral capsule: 3 cap(s) orally 2 times a day  nicotine 14 mg/24 hr transdermal film, extended release: 1 patch transdermal once a day  oxycodone-acetaminophen 5 mg-325 mg oral tablet: 1 tab(s) orally every 6 hours, As needed, Severe Pain (7 - 10)  Senna 8.6 mg oral tablet: 2 tab(s) orally once a day (at bedtime)

## 2022-10-26 NOTE — DISCHARGE NOTE NURSING/CASE MANAGEMENT/SOCIAL WORK - NSDCPEFALRISK_GEN_ALL_CORE
For information on Fall & Injury Prevention, visit: [email or web address] OR  [email or web address] OR  https://www.cdc.gov/steadi/patient.html

## 2022-10-26 NOTE — PROGRESS NOTE ADULT - PROBLEM SELECTOR PROBLEM 1
Pleural effusion, left

## 2022-10-26 NOTE — PROGRESS NOTE ADULT - ASSESSMENT
47M, pmhx schizoaffective disorder, bipolar, smoker, current [place] resident, p/w left sided chest/abd pain, noted to have fever and leukocytosis, CT with large loculated left effusion, pigtail placed 10/12 and d/c'd 10/16, pt with reaccumulated fluid requiring pigtail again 10/18, now s/p L VATS decortication, evacuation of empyema 10/21 with Dr. Abreu;

## 2022-10-26 NOTE — DISCHARGE NOTE NURSING/CASE MANAGEMENT/SOCIAL WORK - PATIENT PORTAL LINK FT
You can access the FollowMyHealth Patient Portal offered by [facility] by registering at the following website: [email or web address]. By joining [employer]’s FollowMyHealth portal, you will also be able to view your health information using other applications (apps) compatible with our system.

## 2022-10-26 NOTE — DISCHARGE NOTE PROVIDER - CARE PROVIDERS DIRECT ADDRESSES
[email or web address]

## 2022-11-04 ENCOUNTER — EMERGENCY (EMERGENCY)
Facility: HOSPITAL | Age: 47
LOS: 1 days | Discharge: HOME | End: 2022-11-04
Attending: EMERGENCY MEDICINE
Payer: MEDICARE

## 2022-11-04 VITALS
HEART RATE: 124 BPM | WEIGHT: 231.93 LBS | DIASTOLIC BLOOD PRESSURE: 83 MMHG | OXYGEN SATURATION: 95 % | TEMPERATURE: 98 F | SYSTOLIC BLOOD PRESSURE: 123 MMHG | RESPIRATION RATE: 20 BRPM | HEIGHT: 72 IN

## 2022-11-04 VITALS
TEMPERATURE: 97 F | SYSTOLIC BLOOD PRESSURE: 119 MMHG | DIASTOLIC BLOOD PRESSURE: 67 MMHG | RESPIRATION RATE: 18 BRPM | OXYGEN SATURATION: 98 % | HEART RATE: 89 BPM

## 2022-11-04 DIAGNOSIS — Z90.89 ACQUIRED ABSENCE OF OTHER ORGANS: Chronic | ICD-10-CM

## 2022-11-04 LAB
ALBUMIN SERPL ELPH-MCNC: 3.3 G/DL — SIGNIFICANT CHANGE UP (ref 3.3–5.2)
ALP SERPL-CCNC: 119 U/L — SIGNIFICANT CHANGE UP (ref 40–120)
ALT FLD-CCNC: 13 U/L — SIGNIFICANT CHANGE UP
ANION GAP SERPL CALC-SCNC: 10 MMOL/L — SIGNIFICANT CHANGE UP (ref 5–17)
APTT BLD: 35.9 SEC — HIGH (ref 27.5–35.5)
AST SERPL-CCNC: 14 U/L — SIGNIFICANT CHANGE UP
BASE EXCESS BLDV CALC-SCNC: 4.7 MMOL/L — HIGH (ref -2–3)
BASOPHILS # BLD AUTO: 0.04 K/UL — SIGNIFICANT CHANGE UP (ref 0–0.2)
BASOPHILS NFR BLD AUTO: 0.4 % — SIGNIFICANT CHANGE UP (ref 0–2)
BILIRUB SERPL-MCNC: 0.2 MG/DL — LOW (ref 0.4–2)
BUN SERPL-MCNC: 8.6 MG/DL — SIGNIFICANT CHANGE UP (ref 8–20)
CA-I SERPL-SCNC: 1.27 MMOL/L — SIGNIFICANT CHANGE UP (ref 1.15–1.33)
CALCIUM SERPL-MCNC: 9.7 MG/DL — SIGNIFICANT CHANGE UP (ref 8.4–10.5)
CHLORIDE BLDV-SCNC: 100 MMOL/L — SIGNIFICANT CHANGE UP (ref 96–108)
CHLORIDE SERPL-SCNC: 98 MMOL/L — SIGNIFICANT CHANGE UP (ref 96–108)
CO2 SERPL-SCNC: 27 MMOL/L — SIGNIFICANT CHANGE UP (ref 22–29)
CREAT SERPL-MCNC: 0.83 MG/DL — SIGNIFICANT CHANGE UP (ref 0.5–1.3)
EGFR: 110 ML/MIN/1.73M2 — SIGNIFICANT CHANGE UP
EOSINOPHIL # BLD AUTO: 0.22 K/UL — SIGNIFICANT CHANGE UP (ref 0–0.5)
EOSINOPHIL NFR BLD AUTO: 2.2 % — SIGNIFICANT CHANGE UP (ref 0–6)
GAS PNL BLDV: 135 MMOL/L — LOW (ref 136–145)
GAS PNL BLDV: SIGNIFICANT CHANGE UP
GAS PNL BLDV: SIGNIFICANT CHANGE UP
GLUCOSE BLDV-MCNC: 107 MG/DL — HIGH (ref 70–99)
GLUCOSE SERPL-MCNC: 101 MG/DL — HIGH (ref 70–99)
HCO3 BLDV-SCNC: 29 MMOL/L — SIGNIFICANT CHANGE UP (ref 22–29)
HCT VFR BLD CALC: 30 % — LOW (ref 39–50)
HCT VFR BLDA CALC: 30 % — SIGNIFICANT CHANGE UP
HGB BLD CALC-MCNC: 10.1 G/DL — LOW (ref 12.6–17.4)
HGB BLD-MCNC: 9.7 G/DL — LOW (ref 13–17)
IMM GRANULOCYTES NFR BLD AUTO: 0.4 % — SIGNIFICANT CHANGE UP (ref 0–0.9)
INR BLD: 1.32 RATIO — HIGH (ref 0.88–1.16)
LACTATE BLDV-MCNC: 0.7 MMOL/L — SIGNIFICANT CHANGE UP (ref 0.5–2)
LYMPHOCYTES # BLD AUTO: 1.85 K/UL — SIGNIFICANT CHANGE UP (ref 1–3.3)
LYMPHOCYTES # BLD AUTO: 18.7 % — SIGNIFICANT CHANGE UP (ref 13–44)
MCHC RBC-ENTMCNC: 29.1 PG — SIGNIFICANT CHANGE UP (ref 27–34)
MCHC RBC-ENTMCNC: 32.3 GM/DL — SIGNIFICANT CHANGE UP (ref 32–36)
MCV RBC AUTO: 90.1 FL — SIGNIFICANT CHANGE UP (ref 80–100)
MONOCYTES # BLD AUTO: 0.71 K/UL — SIGNIFICANT CHANGE UP (ref 0–0.9)
MONOCYTES NFR BLD AUTO: 7.2 % — SIGNIFICANT CHANGE UP (ref 2–14)
NEUTROPHILS # BLD AUTO: 7.03 K/UL — SIGNIFICANT CHANGE UP (ref 1.8–7.4)
NEUTROPHILS NFR BLD AUTO: 71.1 % — SIGNIFICANT CHANGE UP (ref 43–77)
PCO2 BLDV: 45 MMHG — SIGNIFICANT CHANGE UP (ref 42–55)
PH BLDV: 7.42 — SIGNIFICANT CHANGE UP (ref 7.32–7.43)
PLATELET # BLD AUTO: 500 K/UL — HIGH (ref 150–400)
PO2 BLDV: 143 MMHG — HIGH (ref 25–45)
POTASSIUM BLDV-SCNC: 4.3 MMOL/L — SIGNIFICANT CHANGE UP (ref 3.5–5.1)
POTASSIUM SERPL-MCNC: 4.4 MMOL/L — SIGNIFICANT CHANGE UP (ref 3.5–5.3)
POTASSIUM SERPL-SCNC: 4.4 MMOL/L — SIGNIFICANT CHANGE UP (ref 3.5–5.3)
PROT SERPL-MCNC: 8 G/DL — SIGNIFICANT CHANGE UP (ref 6.6–8.7)
PROTHROM AB SERPL-ACNC: 15.4 SEC — HIGH (ref 10.5–13.4)
RAPID RVP RESULT: SIGNIFICANT CHANGE UP
RBC # BLD: 3.33 M/UL — LOW (ref 4.2–5.8)
RBC # FLD: 13.7 % — SIGNIFICANT CHANGE UP (ref 10.3–14.5)
SAO2 % BLDV: 99.9 % — SIGNIFICANT CHANGE UP
SARS-COV-2 RNA SPEC QL NAA+PROBE: SIGNIFICANT CHANGE UP
SODIUM SERPL-SCNC: 135 MMOL/L — SIGNIFICANT CHANGE UP (ref 135–145)
WBC # BLD: 9.89 K/UL — SIGNIFICANT CHANGE UP (ref 3.8–10.5)
WBC # FLD AUTO: 9.89 K/UL — SIGNIFICANT CHANGE UP (ref 3.8–10.5)

## 2022-11-04 PROCEDURE — G1004: CPT

## 2022-11-04 PROCEDURE — 99285 EMERGENCY DEPT VISIT HI MDM: CPT | Mod: 25

## 2022-11-04 PROCEDURE — 71275 CT ANGIOGRAPHY CHEST: CPT | Mod: ME

## 2022-11-04 PROCEDURE — 82330 ASSAY OF CALCIUM: CPT

## 2022-11-04 PROCEDURE — 80053 COMPREHEN METABOLIC PANEL: CPT

## 2022-11-04 PROCEDURE — 99284 EMERGENCY DEPT VISIT MOD MDM: CPT | Mod: CS

## 2022-11-04 PROCEDURE — 82803 BLOOD GASES ANY COMBINATION: CPT

## 2022-11-04 PROCEDURE — 36415 COLL VENOUS BLD VENIPUNCTURE: CPT

## 2022-11-04 PROCEDURE — 85014 HEMATOCRIT: CPT

## 2022-11-04 PROCEDURE — 71045 X-RAY EXAM CHEST 1 VIEW: CPT | Mod: 26

## 2022-11-04 PROCEDURE — 83605 ASSAY OF LACTIC ACID: CPT

## 2022-11-04 PROCEDURE — 96361 HYDRATE IV INFUSION ADD-ON: CPT

## 2022-11-04 PROCEDURE — 84295 ASSAY OF SERUM SODIUM: CPT

## 2022-11-04 PROCEDURE — 93010 ELECTROCARDIOGRAM REPORT: CPT

## 2022-11-04 PROCEDURE — 0225U NFCT DS DNA&RNA 21 SARSCOV2: CPT

## 2022-11-04 PROCEDURE — 71045 X-RAY EXAM CHEST 1 VIEW: CPT

## 2022-11-04 PROCEDURE — 82947 ASSAY GLUCOSE BLOOD QUANT: CPT

## 2022-11-04 PROCEDURE — 96376 TX/PRO/DX INJ SAME DRUG ADON: CPT | Mod: XU

## 2022-11-04 PROCEDURE — 82435 ASSAY OF BLOOD CHLORIDE: CPT

## 2022-11-04 PROCEDURE — 71275 CT ANGIOGRAPHY CHEST: CPT | Mod: 26,ME

## 2022-11-04 PROCEDURE — 93005 ELECTROCARDIOGRAM TRACING: CPT

## 2022-11-04 PROCEDURE — 87040 BLOOD CULTURE FOR BACTERIA: CPT

## 2022-11-04 PROCEDURE — 85018 HEMOGLOBIN: CPT

## 2022-11-04 PROCEDURE — 96365 THER/PROPH/DIAG IV INF INIT: CPT | Mod: XU

## 2022-11-04 PROCEDURE — 84132 ASSAY OF SERUM POTASSIUM: CPT

## 2022-11-04 PROCEDURE — 85610 PROTHROMBIN TIME: CPT

## 2022-11-04 PROCEDURE — 85025 COMPLETE CBC W/AUTO DIFF WBC: CPT

## 2022-11-04 PROCEDURE — 85730 THROMBOPLASTIN TIME PARTIAL: CPT

## 2022-11-04 RX ORDER — SODIUM CHLORIDE 9 MG/ML
2400 INJECTION, SOLUTION INTRAVENOUS ONCE
Refills: 0 | Status: COMPLETED | OUTPATIENT
Start: 2022-11-04 | End: 2022-11-04

## 2022-11-04 RX ORDER — PIPERACILLIN AND TAZOBACTAM 4; .5 G/20ML; G/20ML
3.38 INJECTION, POWDER, LYOPHILIZED, FOR SOLUTION INTRAVENOUS ONCE
Refills: 0 | Status: COMPLETED | OUTPATIENT
Start: 2022-11-04 | End: 2022-11-04

## 2022-11-04 RX ADMIN — PIPERACILLIN AND TAZOBACTAM 200 GRAM(S): 4; .5 INJECTION, POWDER, LYOPHILIZED, FOR SOLUTION INTRAVENOUS at 10:57

## 2022-11-04 RX ADMIN — SODIUM CHLORIDE 2400 MILLILITER(S): 9 INJECTION, SOLUTION INTRAVENOUS at 10:57

## 2022-11-04 RX ADMIN — PIPERACILLIN AND TAZOBACTAM 25 GRAM(S): 4; .5 INJECTION, POWDER, LYOPHILIZED, FOR SOLUTION INTRAVENOUS at 14:43

## 2022-11-04 RX ADMIN — PIPERACILLIN AND TAZOBACTAM 3.38 GRAM(S): 4; .5 INJECTION, POWDER, LYOPHILIZED, FOR SOLUTION INTRAVENOUS at 11:57

## 2022-11-04 NOTE — ED PROVIDER NOTE - PROGRESS NOTE DETAILS
CT reviewed and showing improvement, continue augmentin outpatient. patient feeling better walking around no hypoxia and tachycardia -Slowey DO

## 2022-11-04 NOTE — ED PROVIDER NOTE - PATIENT PORTAL LINK FT
You can access the FollowMyHealth Patient Portal offered by [facility] by registering at the following website: [email or web address]. By joining [employer]’s FollowMyHealth portal, you will also be able to view your health information using other applications (apps) compatible with our system.

## 2022-11-04 NOTE — ED PROVIDER NOTE - NSFOLLOWUPINSTRUCTIONS_ED_ALL_ED_FT
1. Return to ED for worsening, progressive or any other concerning symptoms   2. Follow up with your primary care doctor in 2-3days   3. Continue your antibiotics as prescribed     Shortness of breath    Shortness of breath (dyspnea) means you have trouble breathing and could indicate a medical problem. Causes include lung disease, heart disease, low amount of red blood cells (anemia), poor physical fitness, being overweight, smoking, etc. Your health care provider today may not be able to find a cause for your shortness of breath after your exam. In this case, it is important to have a follow-up exam with your primary care physician as instructed. If medicines were prescribed, take them as directed for the full length of time directed. Refrain from tobacco products.    SEEK IMMEDIATE MEDICAL CARE IF YOU HAVE ANY OF THE FOLLOWING SYMPTOMS: worsening shortness of breath, chest pain, back pain, abdominal pain, fever, coughing up blood, lightheadedness/dizziness.

## 2022-11-04 NOTE — ED PROVIDER NOTE - CLINICAL SUMMARY MEDICAL DECISION MAKING FREE TEXT BOX
patient with worsening Sx after recent admission for PNA/empyema. clinically tachycardiac and warm to touch- will repeat temp, likely recurrent effusion/empyema Lt lung. sepsis protocols. TBA

## 2022-11-04 NOTE — CHART NOTE - NSCHARTNOTEFT_GEN_A_CORE
Pt known to service, s/p LVATS/decort 10/21 discharged 10/26 with 3 weeks PO Augmentin, returns with SOB.  CXR improved from discharge, afebrile, 95% on room air, WBC continues to downtrend.    Can check CT Chest. Thoracic will follow. Pt seen and examined, known to service, s/p MELISSA/sotot for empyema 10/21, discharged 10/26 with 3 weeks PO Augmentin, returns with SOB.  Symptoms began 5 days ago and he only spoke up about it today.  Says he has some chest pressure and SOB like he was "fighting a bear" because he needs oxygen, and that his symptoms resolved when he was placed on oxygen.  SpO2 was 95% on RA.  He also said he was lightheaded once or twice as well.  Denies chest pain, palpitations, back pain, headache, nausea, vomiting, abdominal pain, syncope.  CXR is improved from discharge, with more aeration of left lung.  WBC continues to downtrend.     Vital Signs Last 24 Hrs  T(C): 36.3 (04 Nov 2022 11:44), Max: 36.4 (04 Nov 2022 10:04)  T(F): 97.4 (04 Nov 2022 11:44), Max: 97.5 (04 Nov 2022 10:04)  HR: 89 (04 Nov 2022 11:44) (89 - 124)  BP: 119/67 (04 Nov 2022 11:44) (119/67 - 123/83)  BP(mean): --  RR: 18 (04 Nov 2022 11:44) (18 - 20)  SpO2: 98% (04 Nov 2022 11:44) (95% - 98%)    Parameters below as of 04 Nov 2022 11:44  Patient On (Oxygen Delivery Method): room air    Meds:  amoxicillin-clavulanate 875 mg-125 mg oral tablet: 1 tab(s) orally 2 times a day   · 	aluminum hydroxide-magnesium hydroxide 200 mg-200 mg/5 mL oral suspension: 30 milliliter(s) orally every 4 hours, As needed, Dyspepsia  · 	oxycodone-acetaminophen 5 mg-325 mg oral tablet: 1 tab(s) orally every 6 hours, As needed, Severe Pain (7 - 10)  · 	acetaminophen 325 mg oral tablet: 2 tab(s) orally every 6 hours, As needed, Temp greater or equal to 38C (100.4F), Mild Pain (1 - 3)  · 	ARIPiprazole 400 mg intramuscular injection, extended release: 400 milligram(s) intramuscular every 28 days  · 	atropine 1% ophthalmic solution: 2 drop(s) to each affected eye 2 times a day  · 	clonazePAM 1 mg oral tablet: 1 tab(s) orally 3 times a day  · 	cloZAPine: 250 milligram(s) orally once a day (at bedtime)  · 	docusate sodium 100 mg oral tablet: 1 tab(s) orally 3 times a day, As Needed  · 	lithium 150 mg oral capsule: 3 cap(s) orally 2 times a day  · 	nicotine 14 mg/24 hr transdermal film, extended release: 1 patch transdermal once a day  · 	Senna 8.6 mg oral tablet: 2 tab(s) orally once a day (at bedtime)    Lungs - CTA on right, decreased left base, otherwise clear  Heart - S1S2   Abdomen - soft, NT, +BS    47 Male with recent admission for empyema s/p decort now with recurrent SOB     Can check CT Chest.   Would do full workup for SOB if CT Scan demonstrates improved pleural findings from previous  continue antibiotics  Thoracic will follow.

## 2022-11-04 NOTE — ED ADULT TRIAGE NOTE - CHIEF COMPLAINT QUOTE
pt c/o difficulty, known pneumonia on antibiotics. [facility] aide at bedside. no acute distress at this time.

## 2022-11-04 NOTE — ED PROVIDER NOTE - OBJECTIVE STATEMENT
46yo M [facility] resident with PMH schizoaffective disorder, bipolar disorder, tobacco abuse, admitted to [facility] on 10/11-25  large left loculated pleural effusion, LT VATS 10/21, on PO augmentin still. last few days worsenign SOB on exertion and at rest. today couldn't catch his breath. denies fever. +chills. +cough minimally productive. no CP. no leg swelling.

## 2022-11-04 NOTE — ED ADULT NURSE NOTE - ED STAT RN HANDOFF DETAILS
Report given to the [facility] crew; questions answered. Pt remains alert and O x4; calm and cooperative at this time. NAD noted. Resp E/U. Maintaining SPO2 of 96% on RA. [facility] staff x1 at the bedside.

## 2022-11-04 NOTE — ED ADULT TRIAGE NOTE - SOURCE OF INFORMATION
2014 diminutive hyperplastic polyp  Repeat 10 yr or cologuard Cont f/u armani Whitaker Cont to follow Patient/EMS weight-bearing as tolerated

## 2022-11-04 NOTE — ED PROVIDER NOTE - PHYSICAL EXAMINATION
Gen: NAD, AOx3  Head: NCAT  HEENT: EOMI, oral mucosa moist, normal conjunctiva, neck supple  Lung: decreased BS rt base with faint crackles mid lung field  CV: rrr, no murmur, Normal perfusion  Abd: soft, NTND  MSK: No edema, no visible deformities  Neuro: No focal neurologic deficits  Skin: No rash, surgical site rt chest with sutures inplace C/D/I no drainage  Psych: normal affect

## 2022-11-07 ENCOUNTER — APPOINTMENT (OUTPATIENT)
Dept: THORACIC SURGERY | Facility: CLINIC | Age: 47
End: 2022-11-07

## 2022-11-07 VITALS
OXYGEN SATURATION: 97 % | TEMPERATURE: 98.6 F | RESPIRATION RATE: 16 BRPM | SYSTOLIC BLOOD PRESSURE: 111 MMHG | HEART RATE: 106 BPM | DIASTOLIC BLOOD PRESSURE: 67 MMHG

## 2022-11-07 DIAGNOSIS — Z86.79 PERSONAL HISTORY OF OTHER DISEASES OF THE CIRCULATORY SYSTEM: ICD-10-CM

## 2022-11-07 DIAGNOSIS — Q84.5 ENLARGED AND HYPERTROPHIC NAILS: ICD-10-CM

## 2022-11-07 DIAGNOSIS — Z87.891 PERSONAL HISTORY OF NICOTINE DEPENDENCE: ICD-10-CM

## 2022-11-07 DIAGNOSIS — Z72.0 TOBACCO USE: ICD-10-CM

## 2022-11-07 DIAGNOSIS — L84 CORNS AND CALLOSITIES: ICD-10-CM

## 2022-11-07 DIAGNOSIS — F25.0 SCHIZOAFFECTIVE DISORDER, BIPOLAR TYPE: ICD-10-CM

## 2022-11-07 DIAGNOSIS — F06.1 SCHIZOAFFECTIVE DISORDER, BIPOLAR TYPE: ICD-10-CM

## 2022-11-07 DIAGNOSIS — K59.09 OTHER CONSTIPATION: ICD-10-CM

## 2022-11-07 DIAGNOSIS — Z86.39 PERSONAL HISTORY OF OTHER ENDOCRINE, NUTRITIONAL AND METABOLIC DISEASE: ICD-10-CM

## 2022-11-07 DIAGNOSIS — J86.9 PYOTHORAX W/OUT FISTULA: ICD-10-CM

## 2022-11-07 PROCEDURE — 99024 POSTOP FOLLOW-UP VISIT: CPT

## 2022-11-07 RX ORDER — L. ACIDOPHILUS/L.BULGARICUS 1MM CELL
TABLET ORAL
Refills: 0 | Status: ACTIVE | COMMUNITY

## 2022-11-07 RX ORDER — AMOXICILLIN AND CLAVULANATE POTASSIUM 875; 125 MG/1; MG/1
875-125 TABLET, COATED ORAL
Refills: 0 | Status: ACTIVE | COMMUNITY

## 2022-11-07 RX ORDER — ARIPIPRAZOLE 5 MG/1
5 TABLET ORAL
Refills: 0 | Status: ACTIVE | COMMUNITY

## 2022-11-07 RX ORDER — MAGNESIUM HYDROXIDE 100 %
POWDER (GRAM) MISCELLANEOUS
Refills: 0 | Status: ACTIVE | COMMUNITY

## 2022-11-07 RX ORDER — LITHIUM CARBONATE 300 MG/1
300 CAPSULE ORAL
Refills: 0 | Status: ACTIVE | COMMUNITY

## 2022-11-07 RX ORDER — ATROPINE SULFATE 0.01 %
0.01 DROPS, EMULSION OPHTHALMIC (EYE)
Refills: 0 | Status: ACTIVE | COMMUNITY

## 2022-11-07 RX ORDER — DOCUSATE SODIUM 100 MG
TABLET ORAL
Refills: 0 | Status: ACTIVE | COMMUNITY

## 2022-11-07 RX ORDER — CLONAZEPAM 1 MG
1 TABLET,DISINTEGRATING ORAL
Refills: 0 | Status: ACTIVE | COMMUNITY

## 2022-11-07 RX ORDER — NICOTINE TRANSDERMAL SYSTEM 14 MG/24H
14 PATCH, EXTENDED RELEASE TRANSDERMAL
Refills: 0 | Status: ACTIVE | COMMUNITY

## 2022-11-07 RX ORDER — CLOZAPINE 200 MG/1
200 TABLET ORAL
Refills: 0 | Status: ACTIVE | COMMUNITY

## 2022-11-07 RX ORDER — SENNOSIDES 8.6 MG TABLETS 8.6 MG/1
TABLET ORAL
Refills: 0 | Status: ACTIVE | COMMUNITY

## 2022-11-07 NOTE — PHYSICAL EXAM
[] : no respiratory distress [Auscultation Breath Sounds / Voice Sounds] : lungs were clear to auscultation bilaterally [Heart Rate And Rhythm] : heart rate was normal and rhythm regular [Heart Sounds] : normal S1 and S2 [Heart Sounds Gallop] : no gallops [Murmurs] : no murmurs [Heart Sounds Pericardial Friction Rub] : no pericardial rub [Examination Of The Chest] : the chest was normal in appearance [Chest Visual Inspection Thoracic Asymmetry] : no chest asymmetry [Diminished Respiratory Excursion] : normal chest expansion [FreeTextEntry1] : Wounds are healing well

## 2022-11-07 NOTE — HISTORY OF PRESENT ILLNESS
[FreeTextEntry1] : Mr. JAZZY GRAMAJO presents today for postoperative followup.\par \par On 10/21/22, Mr. GRAMAJO underwent left Video-assisted thoracoscopic total lung decortication for empyema.\par \par Postoperative course unremarkable. Microbiology negative.\par \par Past medical history includes schizoaffective disorder ([facility] resident), bipolar disorder, tobacco abuse \par \par Today, he is accompanied by his aide; he reports that he experiences intermittent shortness of breath and fatigue. Patient denies chest pain, palpitations, cough, fevers, chills, and unintentional weight loss or gain. \par

## 2022-11-07 NOTE — REVIEW OF SYSTEMS
[Feeling Tired] : feeling tired [Negative] : Heme/Lymph [FreeTextEntry6] : intermittent shortness of breath [de-identified] : schizoaffective disorder

## 2022-11-07 NOTE — ASSESSMENT
[FreeTextEntry1] : Nii is a 47-year-old male who underwent decortication several weeks ago and has done well.  He should continue his antibiotics as directed and can see me on a as needed basis going forward.\par \par Thank you for allowing me to participate in the care of your patient.\par \par Grady Abreu MD\par Department of Cardiovascular and Thoracic Surgery\par \par [facility] [employer] [facility] \[facility]\""/[facility]\par

## 2022-11-07 NOTE — DATA REVIEWED
[FreeTextEntry1] :  10/21/2022 14:44 EDT\par \par Surgical Pathology Report - Auth (Verified)\par \par Specimen(s) Submitted\par 1  Products of decortication\par \par Final Diagnosis\par Products of decortication:\par - Inflamed granulation tissue and fibrin.\par \par Verified by: Dinesh Henderson MD\par (Electronic Signature)\par Reported on: 10/26/22 12:18 EDT, [facility], [address] [place] [address]\par _________________________________________________________________

## 2022-11-09 LAB
CULTURE RESULTS: SIGNIFICANT CHANGE UP
CULTURE RESULTS: SIGNIFICANT CHANGE UP
SPECIMEN SOURCE: SIGNIFICANT CHANGE UP
SPECIMEN SOURCE: SIGNIFICANT CHANGE UP

## 2022-11-09 PROCEDURE — 82140 ASSAY OF AMMONIA: CPT

## 2022-11-09 PROCEDURE — 85730 THROMBOPLASTIN TIME PARTIAL: CPT

## 2022-11-09 PROCEDURE — 94660 CPAP INITIATION&MGMT: CPT

## 2022-11-09 PROCEDURE — 94640 AIRWAY INHALATION TREATMENT: CPT

## 2022-11-09 PROCEDURE — 84295 ASSAY OF SERUM SODIUM: CPT

## 2022-11-09 PROCEDURE — 84157 ASSAY OF PROTEIN OTHER: CPT

## 2022-11-09 PROCEDURE — 89051 BODY FLUID CELL COUNT: CPT

## 2022-11-09 PROCEDURE — 87070 CULTURE OTHR SPECIMN AEROBIC: CPT

## 2022-11-09 PROCEDURE — 84484 ASSAY OF TROPONIN QUANT: CPT

## 2022-11-09 PROCEDURE — 80048 BASIC METABOLIC PNL TOTAL CA: CPT

## 2022-11-09 PROCEDURE — 86850 RBC ANTIBODY SCREEN: CPT

## 2022-11-09 PROCEDURE — 83036 HEMOGLOBIN GLYCOSYLATED A1C: CPT

## 2022-11-09 PROCEDURE — 82803 BLOOD GASES ANY COMBINATION: CPT

## 2022-11-09 PROCEDURE — 71250 CT THORAX DX C-: CPT

## 2022-11-09 PROCEDURE — 96374 THER/PROPH/DIAG INJ IV PUSH: CPT

## 2022-11-09 PROCEDURE — 82042 OTHER SOURCE ALBUMIN QUAN EA: CPT

## 2022-11-09 PROCEDURE — 87116 MYCOBACTERIA CULTURE: CPT

## 2022-11-09 PROCEDURE — 87086 URINE CULTURE/COLONY COUNT: CPT

## 2022-11-09 PROCEDURE — C1729: CPT

## 2022-11-09 PROCEDURE — 85014 HEMATOCRIT: CPT

## 2022-11-09 PROCEDURE — 84132 ASSAY OF SERUM POTASSIUM: CPT

## 2022-11-09 PROCEDURE — 93005 ELECTROCARDIOGRAM TRACING: CPT

## 2022-11-09 PROCEDURE — C9399: CPT

## 2022-11-09 PROCEDURE — 82947 ASSAY GLUCOSE BLOOD QUANT: CPT

## 2022-11-09 PROCEDURE — 82945 GLUCOSE OTHER FLUID: CPT

## 2022-11-09 PROCEDURE — 87206 SMEAR FLUORESCENT/ACID STAI: CPT

## 2022-11-09 PROCEDURE — 85025 COMPLETE CBC W/AUTO DIFF WBC: CPT

## 2022-11-09 PROCEDURE — 80053 COMPREHEN METABOLIC PANEL: CPT

## 2022-11-09 PROCEDURE — 70450 CT HEAD/BRAIN W/O DYE: CPT

## 2022-11-09 PROCEDURE — 88305 TISSUE EXAM BY PATHOLOGIST: CPT

## 2022-11-09 PROCEDURE — 71045 X-RAY EXAM CHEST 1 VIEW: CPT

## 2022-11-09 PROCEDURE — 80178 ASSAY OF LITHIUM: CPT

## 2022-11-09 PROCEDURE — 84145 PROCALCITONIN (PCT): CPT

## 2022-11-09 PROCEDURE — 87075 CULTR BACTERIA EXCEPT BLOOD: CPT

## 2022-11-09 PROCEDURE — 83735 ASSAY OF MAGNESIUM: CPT

## 2022-11-09 PROCEDURE — 85610 PROTHROMBIN TIME: CPT

## 2022-11-09 PROCEDURE — 85018 HEMOGLOBIN: CPT

## 2022-11-09 PROCEDURE — 80202 ASSAY OF VANCOMYCIN: CPT

## 2022-11-09 PROCEDURE — 86900 BLOOD TYPING SEROLOGIC ABO: CPT

## 2022-11-09 PROCEDURE — 86901 BLOOD TYPING SEROLOGIC RH(D): CPT

## 2022-11-09 PROCEDURE — 88112 CYTOPATH CELL ENHANCE TECH: CPT

## 2022-11-09 PROCEDURE — 87641 MR-STAPH DNA AMP PROBE: CPT

## 2022-11-09 PROCEDURE — 74176 CT ABD & PELVIS W/O CONTRAST: CPT

## 2022-11-09 PROCEDURE — 80159 DRUG ASSAY CLOZAPINE: CPT

## 2022-11-09 PROCEDURE — U0005: CPT

## 2022-11-09 PROCEDURE — L8699: CPT

## 2022-11-09 PROCEDURE — 84100 ASSAY OF PHOSPHORUS: CPT

## 2022-11-09 PROCEDURE — 87205 SMEAR GRAM STAIN: CPT

## 2022-11-09 PROCEDURE — 82607 VITAMIN B-12: CPT

## 2022-11-09 PROCEDURE — 87640 STAPH A DNA AMP PROBE: CPT

## 2022-11-09 PROCEDURE — U0003: CPT

## 2022-11-09 PROCEDURE — 87102 FUNGUS ISOLATION CULTURE: CPT

## 2022-11-09 PROCEDURE — 36415 COLL VENOUS BLD VENIPUNCTURE: CPT

## 2022-11-09 PROCEDURE — C1889: CPT

## 2022-11-09 PROCEDURE — 84443 ASSAY THYROID STIM HORMONE: CPT

## 2022-11-09 PROCEDURE — 87040 BLOOD CULTURE FOR BACTERIA: CPT

## 2022-11-09 PROCEDURE — 85027 COMPLETE CBC AUTOMATED: CPT

## 2022-11-09 PROCEDURE — 82330 ASSAY OF CALCIUM: CPT

## 2022-11-09 PROCEDURE — 83986 ASSAY PH BODY FLUID NOS: CPT

## 2022-11-09 PROCEDURE — 99285 EMERGENCY DEPT VISIT HI MDM: CPT

## 2022-11-09 PROCEDURE — 80061 LIPID PANEL: CPT

## 2022-11-09 PROCEDURE — 82435 ASSAY OF BLOOD CHLORIDE: CPT

## 2022-11-09 PROCEDURE — 83605 ASSAY OF LACTIC ACID: CPT

## 2022-11-09 PROCEDURE — 87015 SPECIMEN INFECT AGNT CONCNTJ: CPT

## 2022-11-09 PROCEDURE — 81001 URINALYSIS AUTO W/SCOPE: CPT

## 2022-11-09 PROCEDURE — 0225U NFCT DS DNA&RNA 21 SARSCOV2: CPT

## 2022-11-09 PROCEDURE — 83615 LACTATE (LD) (LDH) ENZYME: CPT

## 2022-11-12 LAB
CULTURE RESULTS: SIGNIFICANT CHANGE UP
SPECIMEN SOURCE: SIGNIFICANT CHANGE UP

## 2022-11-14 ENCOUNTER — APPOINTMENT (OUTPATIENT)
Dept: INTERNAL MEDICINE | Facility: CLINIC | Age: 47
End: 2022-11-14

## 2022-11-16 LAB
CULTURE RESULTS: SIGNIFICANT CHANGE UP
SPECIMEN SOURCE: SIGNIFICANT CHANGE UP

## 2022-11-28 ENCOUNTER — APPOINTMENT (OUTPATIENT)
Dept: INTERNAL MEDICINE | Facility: CLINIC | Age: 47
End: 2022-11-28

## 2022-11-28 VITALS
TEMPERATURE: 97.2 F | DIASTOLIC BLOOD PRESSURE: 72 MMHG | BODY MASS INDEX: 30.8 KG/M2 | SYSTOLIC BLOOD PRESSURE: 109 MMHG | HEIGHT: 71 IN | HEART RATE: 113 BPM | WEIGHT: 220 LBS | OXYGEN SATURATION: 96 %

## 2022-11-28 PROCEDURE — 99212 OFFICE O/P EST SF 10 MIN: CPT

## 2022-11-28 NOTE — ASSESSMENT
[FreeTextEntry1] : Patient has completed a prolonged course of antibiotics  for empyema and is doing well. Denies respiratory complaints. f/u imaging in hospital had shown improvement\par \par Labs from [facility] show wbc 11.4 andK 5.3 from 10/28/22\par \par Suggest:\par repeat [facility] CMP\par Chest X ray\par \par Patient refused to get blood work drawn from our office today and states he will have it done at [facility]. \par Called [facility] to obtain fax number to send consult notes and recommendations, left voicemails. Paperwork  left with MA tasked to obtain fax number and send records.\par \par \par \par f/u ID as needed

## 2022-11-28 NOTE — HISTORY OF PRESENT ILLNESS
[FreeTextEntry1] : Pt is here with aide from [facility] for f/u visit\par \par 46 yo male with hx of Schizoaffective disorder, bipolar and tobacco abuse presented to ED with complaints of left sided pain and shortness of breath.  While in the ED, cxr and CT chest was obtained which showed a large loculated left pleural effusion. now s/p left VATS/decortication 10/21/22. Was on iv zosyn at [facility]. Both chest tubes removed. Pleural fluid and tissue cx were negative. Pt  symptoms and imaging improved and was discharged on augmentin 875/125mg q12h through 11/18/22\par \par Completed abx\par Saw Dr Abreu for f/u\par reports breathing "is much better"\par Denies dyspnea cough fever, chest pain, just has allergy symptoms/runny nose\par states he is getting aggravated because he was not told he has a f/u appointment today\par \par \par \par \par

## 2022-11-28 NOTE — PHYSICAL EXAM
[General Appearance - Alert] : alert [General Appearance - In No Acute Distress] : in no acute distress [Sclera] : the sclera and conjunctiva were normal [PERRL With Normal Accommodation] : pupils were equal in size, round, reactive to light [Extraocular Movements] : extraocular movements were intact [Outer Ear] : the ears and nose were normal in appearance [Oropharynx] : the oropharynx was normal with no thrush [FreeTextEntry1] : left base decreased air entry, healed scars left posterior chest wall [Full Pulse] : the pedal pulses are present [Edema] : there was no peripheral edema [Bowel Sounds] : normal bowel sounds [Abdomen Soft] : soft [Abdomen Tenderness] : non-tender [] : no hepato-splenomegaly [Abdomen Mass (___ Cm)] : no abdominal mass palpated [Costovertebral Angle Tenderness] : no CVA tenderness [Deep Tendon Reflexes (DTR)] : deep tendon reflexes were 2+ and symmetric [Sensation] : the sensory exam was normal to light touch and pinprick [No Focal Deficits] : no focal deficits

## 2022-12-26 ENCOUNTER — OUTPATIENT (OUTPATIENT)
Dept: OUTPATIENT SERVICES | Facility: HOSPITAL | Age: 47
LOS: 1 days | End: 2022-12-26
Payer: MEDICARE

## 2022-12-26 DIAGNOSIS — Z20.822 CONTACT WITH AND (SUSPECTED) EXPOSURE TO COVID-19: ICD-10-CM

## 2022-12-26 DIAGNOSIS — Z90.89 ACQUIRED ABSENCE OF OTHER ORGANS: Chronic | ICD-10-CM

## 2022-12-26 PROCEDURE — 0225U NFCT DS DNA&RNA 21 SARSCOV2: CPT

## 2023-01-01 NOTE — PATIENT PROFILE ADULT - NSPROMEDSADMININFO_GEN_A_NUR
on the discharge service for the patient. I have reviewed and made amendments to the documentation where necessary.
no concerns

## 2023-03-23 NOTE — BH CONSULTATION LIAISON PROGRESS NOTE - NSBHMSEINTELL_PSY_A_CORE
Quality 130: Documentation Of Current Medications In The Medical Record: Current Medications Documented
Unable to assess
Quality 110: Preventive Care And Screening: Influenza Immunization: Influenza Immunization not Administered for Documented Reasons.
Quality 431: Preventive Care And Screening: Unhealthy Alcohol Use - Screening: Patient not identified as an unhealthy alcohol user when screened for unhealthy alcohol use using a systematic screening method
Quality 226: Preventive Care And Screening: Tobacco Use: Screening And Cessation Intervention: Patient screened for tobacco use and is an ex/non-smoker
Detail Level: Detailed
Average
Below Average

## 2024-05-12 NOTE — PATIENT PROFILE ADULT - FUNCTIONAL SCREEN CURRENT LEVEL: COMMUNICATION, MLM
,[email or web address]
0 = understands/communicates without difficulty

## 2024-11-29 NOTE — PROGRESS NOTE ADULT - SUBJECTIVE AND OBJECTIVE BOX
[Time Spent: ___ minutes] : I have spent [unfilled] minutes of time on the encounter which excludes teaching and separately reported services. Patient seen and examined.  Denies CP, SOB, N/V.    T(C): 36.9 (10-19-22 @ 04:00)  T(F): 98.4 (10-19-22 @ 04:00)  HR: 86 (10-19-22 @ 04:00)  BP: 106/59 (10-19-22 @ 04:00)  BP(mean): 78 (10-18-22 @ 18:18)    RR: 17 (10-19-22 @ 04:00)  SpO2: 96% (10-19-22 @ 04:00)        Physical Exam:  Gen: A&Ox3  Pulm:  Decreased left side  CV:  S1S2, RRR, no m/r/g  Abd: +BS, soft, NT, ND  Ext:  +DP b/l, no c/c/e  Incision:  c/d/i no click, no exudate    I&O's Detail    18 Oct 2022 07:01  -  19 Oct 2022 07:00  --------------------------------------------------------  IN:    Oral Fluid: 480 mL  Total IN: 480 mL    OUT:    Chest Tube (mL): 460 mL    Voided (mL): 1100 mL  Total OUT: 1560 mL    Total NET: -1080 mL                              11.0   12.77 )-----------( 528      ( 19 Oct 2022 05:05 )             35.2   10-19    136  |  98  |  3.7<L>  ----------------------------<  107<H>  3.9   |  32.0<H>  |  0.54    Ca    9.2      19 Oct 2022 05:05  Phos  3.1     10-19  Mg     2.1     10-19    aPTT: 30.7 sec; PT: 17.3 sec; INR: 1.49 ratio  10-18-22 @ 04:40       ABG - ( 18 Oct 2022 21:50 )  pH: 7.450 /  pCO2: 57    /  pO2: 60    / HCO3: 40    / Base Excess: 15.6  /  SaO2: 94.5 /  Lactate: x        CAPILLARY BLOOD GLUCOSE            Medications:  acetaminophen     Tablet .. 650 milliGRAM(s) Oral every 6 hours PRN  aluminum hydroxide/magnesium hydroxide/simethicone Suspension 30 milliLiter(s) Oral every 4 hours PRN  atropine 1% Solution 1 Drop(s) Both EYES two times a day  benzocaine 15 mG/menthol 3.6 mG Lozenge 1 Lozenge Oral every 4 hours PRN  clonazePAM  Tablet 1 milliGRAM(s) Oral three times a day  cloZAPine 250 milliGRAM(s) Oral at bedtime  lidocaine 1% Injectable 20 milliLiter(s) Local Injection once  lidocaine 1% Injectable 30 milliLiter(s) Local Injection once  lithium 450 milliGRAM(s) Oral two times a day  melatonin 3 milliGRAM(s) Oral at bedtime PRN  nicotine -  14 mG/24Hr(s) Patch 1 Patch Transdermal daily  ondansetron Injectable 4 milliGRAM(s) IV Push every 8 hours PRN  oxycodone    5 mG/acetaminophen 325 mG 1 Tablet(s) Oral every 6 hours PRN  piperacillin/tazobactam IVPB.. 3.375 Gram(s) IV Intermittent every 8 hours  senna 2 Tablet(s) Oral at bedtime  vancomycin  IVPB 1250 milliGRAM(s) IV Intermittent every 8 hours

## 2025-02-26 NOTE — ED ADULT TRIAGE NOTE - NS ED TRIAGE AVPU SCALE
PA Initiation    Medication: POSACONAZOLE 100 MG PO [facility]  Insurance Company: [employer] - Phone [phone] Fax [phone]  Pharmacy Filling the Rx: [facility] - [place], [place] - [address]  Filling Pharmacy Phone: [phone]    Start Date: 2/26/2025          Margarita Alvarado  [facility] Pharm[facility] Liaison (A - L)  Phone [phone]  Fax [phone]  Available on Teams & Vocera     Alert-The patient is alert, awake and responds to voice. The patient is oriented to time, place, and person. The triage nurse is able to obtain subjective information.

## (undated) DEVICE — CHEST DRAIN PLEUR-EVAC DRY/WET ADULT-PEDS SINGLE (QUICK)

## (undated) DEVICE — TRAY IRRIGATION SYR BULB 60CC

## (undated) DEVICE — LIGASURE MARYLAND 37CM

## (undated) DEVICE — SUT VICRYL 1 36" CT

## (undated) DEVICE — VENODYNE/SCD SLEEVE CALF MEDIUM

## (undated) DEVICE — VALVE BIOPSY BRONCHOVIDEOSCOPE

## (undated) DEVICE — WARMING BLANKET LOWER ADULT

## (undated) DEVICE — SYR CONTROL LUER LOK 10CC

## (undated) DEVICE — DRAPE IOBAN 23" X 17"

## (undated) DEVICE — LOANER FEE ACL INSTRUMENT

## (undated) DEVICE — PACK GENERAL LAPAROSCOPY

## (undated) DEVICE — SUT VICRYL 0 27" UR-6

## (undated) DEVICE — SUT SILK 2-0 30" SH

## (undated) DEVICE — SUT MONOCRYL 4-0 27" PS-2 UNDYED

## (undated) DEVICE — TRAP SPECIMEN SPUTUM 40CC

## (undated) DEVICE — VALVE SUCTION EVIS 160/200/240

## (undated) DEVICE — DISSECTOR ENDOSCOPIC KITTNER SINGLE TIP

## (undated) DEVICE — SUT PROLENE 1 30" CT

## (undated) DEVICE — WARMING BLANKET UPPER ADULT

## (undated) DEVICE — ENDOCATCH 10MM SPECIMEN POUCH

## (undated) DEVICE — NDL SPINAL 22G X 3.5" (BLACK)

## (undated) DEVICE — BRUSH CYTO DISP

## (undated) DEVICE — SUT PROLENE 0 30" CT-1

## (undated) DEVICE — SOL IRR POUR H2O 1000ML

## (undated) DEVICE — DRAPE XL SHEET 77X98"

## (undated) DEVICE — ENDOCATCH II 15MM

## (undated) DEVICE — BLADE SURGICAL #10 CARBON

## (undated) DEVICE — DRSG DERMABOND 0.7ML

## (undated) DEVICE — SUCTION YANKAUER TAPERED BULBOUS NO VENT

## (undated) DEVICE — ELCTR BOVIE TIP BLADE INSULATED 2.75" EDGE

## (undated) DEVICE — DRAPE 3/4 SHEET 52X76"

## (undated) DEVICE — BLADE SURGICAL #15 CARBON

## (undated) DEVICE — SUT VICRYL 0 27" CT-2 UNDYED

## (undated) DEVICE — STAPLER COVIDIEN ENDO GIA STANDARD HANDLE

## (undated) DEVICE — FOLEY TRAY 16FR 5CC LF UMETER CLOSED

## (undated) DEVICE — VISITEC 4X4

## (undated) DEVICE — GLV 7.5 PROTEXIS (WHITE)

## (undated) DEVICE — ELCTR BLADE IMA/ENT

## (undated) DEVICE — SOL IRR POUR NS 0.9% 1000ML

## (undated) DEVICE — TUBING CONNECTING 6MM 20FT